# Patient Record
Sex: FEMALE | Race: WHITE | NOT HISPANIC OR LATINO
[De-identification: names, ages, dates, MRNs, and addresses within clinical notes are randomized per-mention and may not be internally consistent; named-entity substitution may affect disease eponyms.]

---

## 2017-12-13 ENCOUNTER — IMAGING SERVICES (OUTPATIENT)
Dept: OTHER | Age: 67
End: 2017-12-13

## 2017-12-13 ENCOUNTER — HOSPITAL (OUTPATIENT)
Dept: OTHER | Age: 67
End: 2017-12-13
Attending: INTERNAL MEDICINE

## 2017-12-22 ENCOUNTER — CHARTING TRANS (OUTPATIENT)
Dept: OTHER | Age: 67
End: 2017-12-22

## 2017-12-22 ENCOUNTER — MYAURORA ACCOUNT LINK (OUTPATIENT)
Dept: OTHER | Age: 67
End: 2017-12-22

## 2017-12-26 ENCOUNTER — LAB SERVICES (OUTPATIENT)
Dept: OTHER | Age: 67
End: 2017-12-26

## 2017-12-27 ENCOUNTER — CHARTING TRANS (OUTPATIENT)
Dept: OTHER | Age: 67
End: 2017-12-27

## 2017-12-27 LAB
ALBUMIN SERPL-MCNC: 3.7 G/DL (ref 3.6–5.1)
ALBUMIN/GLOB SERPL: 1.1 (ref 1–2.4)
ALP SERPL-CCNC: 180 UNITS/L (ref 45–117)
ALT SERPL-CCNC: 54 UNITS/L
ANION GAP SERPL CALC-SCNC: 14 MMOL/L (ref 10–20)
AST SERPL-CCNC: 33 UNITS/L
BASOPHILS # BLD: 0.1 K/MCL (ref 0–0.3)
BASOPHILS NFR BLD: 1 %
BILIRUB SERPL-MCNC: 0.5 MG/DL (ref 0.2–1)
BUN SERPL-MCNC: 21 MG/DL (ref 6–20)
BUN/CREAT SERPL: 20 (ref 7–25)
CALCIUM SERPL-MCNC: 8.9 MG/DL (ref 8.4–10.2)
CHLORIDE SERPL-SCNC: 106 MMOL/L (ref 98–107)
CHOLEST SERPL-MCNC: 150 MG/DL
CHOLEST/HDLC SERPL: 3.3
CO2 SERPL-SCNC: 26 MMOL/L (ref 21–32)
CREAT SERPL-MCNC: 1.05 MG/DL (ref 0.51–0.95)
DIFFERENTIAL METHOD BLD: ABNORMAL
EOSINOPHIL # BLD: 0.6 K/MCL (ref 0.1–0.5)
EOSINOPHIL NFR BLD: 11 %
ERYTHROCYTE [DISTWIDTH] IN BLOOD: 13.9 % (ref 11–15)
GLOBULIN SER-MCNC: 3.3 G/DL (ref 2–4)
GLUCOSE SERPL-MCNC: 127 MG/DL (ref 65–99)
HDLC SERPL-MCNC: 46 MG/DL
HEMATOCRIT: 41.6 % (ref 36–46.5)
HEMOGLOBIN: 13.3 G/DL (ref 12–15.5)
LDLC SERPL CALC-MCNC: 75 MG/DL
LENGTH OF FAST TIME PATIENT: ABNORMAL HRS
LENGTH OF FAST TIME PATIENT: ABNORMAL HRS
LYMPHOCYTES # BLD: 1 K/MCL (ref 1–4)
LYMPHOCYTES NFR BLD: 19 %
MEAN CORPUSCULAR HEMOGLOBIN: 29.9 PG (ref 26–34)
MEAN CORPUSCULAR HGB CONC: 32 G/DL (ref 32–36.5)
MEAN CORPUSCULAR VOLUME: 93.5 FL (ref 78–100)
MONOCYTES # BLD: 0.3 K/MCL (ref 0.3–0.9)
MONOCYTES NFR BLD: 5 %
NEUTROPHILS # BLD: 3.5 K/MCL (ref 1.8–7.7)
NEUTROPHILS NFR BLD: 64 %
NONHDLC SERPL-MCNC: 104 MG/DL
PLATELET COUNT: 213 K/MCL (ref 140–450)
POTASSIUM SERPL-SCNC: 4.2 MMOL/L (ref 3.4–5.1)
RED CELL COUNT: 4.45 MIL/MCL (ref 4–5.2)
SODIUM SERPL-SCNC: 142 MMOL/L (ref 135–145)
TOTAL PROTEIN: 7 G/DL (ref 6.4–8.2)
TRIGL SERPL-MCNC: 143 MG/DL
TSH SERPL-ACNC: 1.05 MCUNITS/ML (ref 0.35–5)
WHITE BLOOD COUNT: 5.5 K/MCL (ref 4.2–11)

## 2018-01-12 ENCOUNTER — HOSPITAL (OUTPATIENT)
Dept: OTHER | Age: 68
End: 2018-01-12
Attending: INTERNAL MEDICINE

## 2018-02-28 ENCOUNTER — LAB SERVICES (OUTPATIENT)
Dept: OTHER | Age: 68
End: 2018-02-28

## 2018-03-01 ENCOUNTER — CHARTING TRANS (OUTPATIENT)
Dept: OTHER | Age: 68
End: 2018-03-01

## 2018-03-01 LAB
GLUCOSE SERPL-MCNC: 95 MG/DL (ref 65–99)
HBA1C MFR BLD: 6.3 % (ref 4.5–5.6)
LENGTH OF FAST TIME PATIENT: NORMAL HRS

## 2018-04-30 ENCOUNTER — TELEPHONE (OUTPATIENT)
Dept: INTERNAL MEDICINE CLINIC | Facility: CLINIC | Age: 68
End: 2018-04-30

## 2018-04-30 ENCOUNTER — OFFICE VISIT (OUTPATIENT)
Dept: INTERNAL MEDICINE CLINIC | Facility: CLINIC | Age: 68
End: 2018-04-30

## 2018-04-30 VITALS
HEART RATE: 74 BPM | DIASTOLIC BLOOD PRESSURE: 60 MMHG | HEIGHT: 62.75 IN | OXYGEN SATURATION: 99 % | WEIGHT: 178 LBS | TEMPERATURE: 99 F | RESPIRATION RATE: 16 BRPM | SYSTOLIC BLOOD PRESSURE: 140 MMHG | BODY MASS INDEX: 31.94 KG/M2

## 2018-04-30 DIAGNOSIS — C44.319 BASAL CELL CARCINOMA (BCC) OF SKIN OF OTHER PART OF FACE: ICD-10-CM

## 2018-04-30 DIAGNOSIS — F33.1 MDD (MAJOR DEPRESSIVE DISORDER), RECURRENT EPISODE, MODERATE (HCC): ICD-10-CM

## 2018-04-30 DIAGNOSIS — I10 ESSENTIAL HYPERTENSION: ICD-10-CM

## 2018-04-30 DIAGNOSIS — E66.09 CLASS 1 OBESITY DUE TO EXCESS CALORIES WITH SERIOUS COMORBIDITY AND BODY MASS INDEX (BMI) OF 31.0 TO 31.9 IN ADULT: ICD-10-CM

## 2018-04-30 DIAGNOSIS — M94.9 DISORDER OF BONE AND CARTILAGE: ICD-10-CM

## 2018-04-30 DIAGNOSIS — E78.2 MIXED HYPERLIPIDEMIA: Primary | ICD-10-CM

## 2018-04-30 DIAGNOSIS — M89.9 DISORDER OF BONE AND CARTILAGE: ICD-10-CM

## 2018-04-30 PROBLEM — C44.310 BASAL CELL CARCINOMA (BCC) OF SKIN OF FACE: Status: ACTIVE | Noted: 2018-04-30

## 2018-04-30 PROBLEM — E66.811 CLASS 1 OBESITY DUE TO EXCESS CALORIES WITH SERIOUS COMORBIDITY AND BODY MASS INDEX (BMI) OF 31.0 TO 31.9 IN ADULT: Status: ACTIVE | Noted: 2018-04-30

## 2018-04-30 PROCEDURE — 99204 OFFICE O/P NEW MOD 45 MIN: CPT | Performed by: INTERNAL MEDICINE

## 2018-04-30 RX ORDER — ATORVASTATIN CALCIUM 20 MG/1
20 TABLET, FILM COATED ORAL DAILY
COMMUNITY
Start: 2018-04-16 | End: 2018-10-25

## 2018-04-30 RX ORDER — CETIRIZINE HYDROCHLORIDE 10 MG/1
10 TABLET ORAL DAILY
COMMUNITY

## 2018-04-30 RX ORDER — FLUOXETINE HYDROCHLORIDE 20 MG/1
20 CAPSULE ORAL DAILY
COMMUNITY
End: 2018-06-18

## 2018-04-30 RX ORDER — CALCIUM POLYCARBOPHIL 625 MG
1 TABLET ORAL 2 TIMES DAILY
COMMUNITY
End: 2020-04-10

## 2018-04-30 RX ORDER — GARLIC EXTRACT 500 MG
1 CAPSULE ORAL DAILY
COMMUNITY
End: 2022-01-21

## 2018-04-30 RX ORDER — METOPROLOL TARTRATE 50 MG/1
50 TABLET, FILM COATED ORAL 2 TIMES DAILY
COMMUNITY
Start: 2018-03-20 | End: 2019-11-03

## 2018-04-30 RX ORDER — MULTIVIT-MINS NO.63/IRON/FOLIC 27 MG-1 MG
1 TABLET ORAL DAILY
COMMUNITY
End: 2018-08-10 | Stop reason: CLARIF

## 2018-04-30 NOTE — PROGRESS NOTES
Humera Coronel is a 74year old female. HPI:   Patient presents for the following issues. She was 10 minutes late. 1. HTN: currently on metoprolol. Last took it last night. Does not measure blood pressures at home.    2. HLP: doing well on atorvastat Encounters:  04/30/18 : 178 lb        Latex                   Rash  Asacol [Mesalamine]     Rash  Atenolol                Rash  Codeine                 Nausea and vomiting    Family History   Problem Relation Age of Onset   • Diabetes Father    • Heart Dis metoprolol 50 BID for now. If remains above goal at next visit I will add another medication. # HLP: doing well on atorvastatin. Check lipids with next set of labs. # MDD, Recurrent: she feels much better when on prozac. Has been on it for 35 years.  Wi

## 2018-05-01 NOTE — PROGRESS NOTES
Colonoscopy on 2/21/2018 by Dr. Tod Wong: normal except for grade 1-2 internal hemorrhoids. Biopsies were negative for dysplasia but she did have mild chronic inflammation in righth colon.

## 2018-05-03 ENCOUNTER — MED REC SCAN ONLY (OUTPATIENT)
Dept: INTERNAL MEDICINE CLINIC | Facility: CLINIC | Age: 68
End: 2018-05-03

## 2018-05-03 ENCOUNTER — TELEPHONE (OUTPATIENT)
Dept: INTERNAL MEDICINE CLINIC | Facility: CLINIC | Age: 68
End: 2018-05-03

## 2018-05-08 NOTE — PROGRESS NOTES
We received records from [facility] which will be scanned in.     Labs 2/28/2018  A1C 6.3  CMP GFR 52 o/w normal    Labs 12/27/2017    CBC normal  Total cholesterol 150  HDL 46    LDL 75    MRI/MRCP on 4/1/2016: hepatic steatosis, s

## 2018-05-09 ENCOUNTER — APPOINTMENT (OUTPATIENT)
Dept: LAB | Age: 68
End: 2018-05-09
Attending: INTERNAL MEDICINE
Payer: MEDICARE

## 2018-05-09 ENCOUNTER — HOSPITAL ENCOUNTER (OUTPATIENT)
Dept: BONE DENSITY | Age: 68
Discharge: HOME | End: 2018-05-09
Attending: INTERNAL MEDICINE
Payer: MEDICARE

## 2018-05-09 DIAGNOSIS — I10 ESSENTIAL HYPERTENSION: ICD-10-CM

## 2018-05-09 DIAGNOSIS — M89.9 DISORDER OF BONE AND CARTILAGE: ICD-10-CM

## 2018-05-09 DIAGNOSIS — M94.9 DISORDER OF BONE AND CARTILAGE: ICD-10-CM

## 2018-05-09 DIAGNOSIS — F33.1 MDD (MAJOR DEPRESSIVE DISORDER), RECURRENT EPISODE, MODERATE (HCC): ICD-10-CM

## 2018-05-09 DIAGNOSIS — E78.2 MIXED HYPERLIPIDEMIA: ICD-10-CM

## 2018-05-09 PROCEDURE — 84460 ALANINE AMINO (ALT) (SGPT): CPT

## 2018-05-09 PROCEDURE — 86803 HEPATITIS C AB TEST: CPT

## 2018-05-09 PROCEDURE — 80048 BASIC METABOLIC PNL TOTAL CA: CPT

## 2018-05-09 PROCEDURE — 36415 COLL VENOUS BLD VENIPUNCTURE: CPT

## 2018-05-09 PROCEDURE — 84450 TRANSFERASE (AST) (SGOT): CPT

## 2018-05-09 PROCEDURE — 80061 LIPID PANEL: CPT

## 2018-05-09 PROCEDURE — 77080 DXA BONE DENSITY AXIAL: CPT | Performed by: INTERNAL MEDICINE

## 2018-06-18 ENCOUNTER — OFFICE VISIT (OUTPATIENT)
Dept: INTERNAL MEDICINE CLINIC | Facility: CLINIC | Age: 68
End: 2018-06-18

## 2018-06-18 VITALS
TEMPERATURE: 98 F | DIASTOLIC BLOOD PRESSURE: 60 MMHG | RESPIRATION RATE: 16 BRPM | HEART RATE: 64 BPM | SYSTOLIC BLOOD PRESSURE: 124 MMHG | HEIGHT: 63 IN | WEIGHT: 176 LBS | BODY MASS INDEX: 31.18 KG/M2

## 2018-06-18 DIAGNOSIS — E66.09 CLASS 1 OBESITY DUE TO EXCESS CALORIES WITH SERIOUS COMORBIDITY AND BODY MASS INDEX (BMI) OF 31.0 TO 31.9 IN ADULT: ICD-10-CM

## 2018-06-18 DIAGNOSIS — I10 ESSENTIAL HYPERTENSION: Primary | ICD-10-CM

## 2018-06-18 DIAGNOSIS — E78.2 MIXED HYPERLIPIDEMIA: ICD-10-CM

## 2018-06-18 DIAGNOSIS — F33.1 MDD (MAJOR DEPRESSIVE DISORDER), RECURRENT EPISODE, MODERATE (HCC): ICD-10-CM

## 2018-06-18 PROCEDURE — 99214 OFFICE O/P EST MOD 30 MIN: CPT | Performed by: INTERNAL MEDICINE

## 2018-06-18 RX ORDER — FLUOXETINE HYDROCHLORIDE 20 MG/1
20 CAPSULE ORAL DAILY
Qty: 90 CAPSULE | Refills: 3 | Status: SHIPPED | OUTPATIENT
Start: 2018-06-18 | End: 2019-01-02

## 2018-06-18 NOTE — PATIENT INSTRUCTIONS
You need 3 eight ounce servings of calcium/vitamin D daily. This includes spinach, kale, broccoli, almonds, milk, yogurt, or cottage cheese.      Please have labs completed in December, 2018 and see me 1 week later for your medicare wellness exam.     I do

## 2018-08-08 ENCOUNTER — HOSPITAL ENCOUNTER (EMERGENCY)
Facility: HOSPITAL | Age: 68
Discharge: HOME | End: 2018-08-09
Attending: STUDENT IN AN ORGANIZED HEALTH CARE EDUCATION/TRAINING PROGRAM
Payer: MEDICARE

## 2018-08-08 DIAGNOSIS — R10.13 EPIGASTRIC PAIN: Primary | ICD-10-CM

## 2018-08-08 PROCEDURE — 96361 HYDRATE IV INFUSION ADD-ON: CPT

## 2018-08-08 PROCEDURE — 96360 HYDRATION IV INFUSION INIT: CPT

## 2018-08-08 PROCEDURE — 99285 EMERGENCY DEPT VISIT HI MDM: CPT

## 2018-08-08 PROCEDURE — 93010 ELECTROCARDIOGRAM REPORT: CPT

## 2018-08-08 PROCEDURE — 93005 ELECTROCARDIOGRAM TRACING: CPT

## 2018-08-09 ENCOUNTER — APPOINTMENT (OUTPATIENT)
Dept: GENERAL RADIOLOGY | Facility: HOSPITAL | Age: 68
End: 2018-08-09
Attending: STUDENT IN AN ORGANIZED HEALTH CARE EDUCATION/TRAINING PROGRAM
Payer: MEDICARE

## 2018-08-09 ENCOUNTER — APPOINTMENT (OUTPATIENT)
Dept: CT IMAGING | Facility: HOSPITAL | Age: 68
End: 2018-08-09
Attending: STUDENT IN AN ORGANIZED HEALTH CARE EDUCATION/TRAINING PROGRAM
Payer: MEDICARE

## 2018-08-09 VITALS
HEART RATE: 57 BPM | HEIGHT: 63 IN | BODY MASS INDEX: 30.12 KG/M2 | DIASTOLIC BLOOD PRESSURE: 60 MMHG | TEMPERATURE: 98 F | RESPIRATION RATE: 10 BRPM | SYSTOLIC BLOOD PRESSURE: 132 MMHG | WEIGHT: 170 LBS | OXYGEN SATURATION: 100 %

## 2018-08-09 LAB
ALBUMIN SERPL-MCNC: 3.8 G/DL (ref 3.5–4.8)
ALBUMIN/GLOB SERPL: 1 {RATIO} (ref 1–2)
ALP LIVER SERPL-CCNC: 143 U/L (ref 50–130)
ALT SERPL-CCNC: 51 U/L (ref 14–54)
ANION GAP SERPL CALC-SCNC: 10 MMOL/L (ref 0–18)
AST SERPL-CCNC: 44 U/L (ref 15–41)
ATRIAL RATE: 68 BPM
BASOPHILS # BLD AUTO: 0.1 X10(3) UL (ref 0–0.1)
BASOPHILS NFR BLD AUTO: 1.1 %
BILIRUB SERPL-MCNC: 0.5 MG/DL (ref 0.1–2)
BUN BLD-MCNC: 25 MG/DL (ref 8–20)
BUN/CREAT SERPL: 23.8 (ref 10–20)
CALCIUM BLD-MCNC: 8.8 MG/DL (ref 8.3–10.3)
CHLORIDE SERPL-SCNC: 106 MMOL/L (ref 101–111)
CO2 SERPL-SCNC: 23 MMOL/L (ref 22–32)
CREAT BLD-MCNC: 1.05 MG/DL (ref 0.55–1.02)
D-DIMER: <0.27 UG/ML FEU (ref 0–0.49)
EOSINOPHIL # BLD AUTO: 0.35 X10(3) UL (ref 0–0.3)
EOSINOPHIL NFR BLD AUTO: 3.9 %
ERYTHROCYTE [DISTWIDTH] IN BLOOD BY AUTOMATED COUNT: 12.9 % (ref 11.5–16)
GLOBULIN PLAS-MCNC: 3.8 G/DL (ref 2.5–3.7)
GLUCOSE BLD-MCNC: 158 MG/DL (ref 70–99)
HCT VFR BLD AUTO: 40.4 % (ref 34–50)
HGB BLD-MCNC: 13.3 G/DL (ref 12–16)
IMMATURE GRANULOCYTE COUNT: 0.04 X10(3) UL (ref 0–1)
IMMATURE GRANULOCYTE RATIO %: 0.4 %
LIPASE: 786 U/L (ref 73–393)
LYMPHOCYTES # BLD AUTO: 0.89 X10(3) UL (ref 0.9–4)
LYMPHOCYTES NFR BLD AUTO: 9.9 %
M PROTEIN MFR SERPL ELPH: 7.6 G/DL (ref 6.1–8.3)
MCH RBC QN AUTO: 29.7 PG (ref 27–33.2)
MCHC RBC AUTO-ENTMCNC: 32.9 G/DL (ref 31–37)
MCV RBC AUTO: 90.2 FL (ref 81–100)
MONOCYTES # BLD AUTO: 0.57 X10(3) UL (ref 0.1–1)
MONOCYTES NFR BLD AUTO: 6.4 %
NEUTROPHIL ABS PRELIM: 7 X10 (3) UL (ref 1.3–6.7)
NEUTROPHILS # BLD AUTO: 7 X10(3) UL (ref 1.3–6.7)
NEUTROPHILS NFR BLD AUTO: 78.3 %
OSMOLALITY SERPL CALC.SUM OF ELEC: 296 MOSM/KG (ref 275–295)
P AXIS: 47 DEGREES
P-R INTERVAL: 144 MS
PLATELET # BLD AUTO: 237 10(3)UL (ref 150–450)
POTASSIUM SERPL-SCNC: 3.6 MMOL/L (ref 3.6–5.1)
Q-T INTERVAL: 442 MS
QRS DURATION: 74 MS
QTC CALCULATION (BEZET): 469 MS
R AXIS: 11 DEGREES
RBC # BLD AUTO: 4.48 X10(6)UL (ref 3.8–5.1)
RED CELL DISTRIBUTION WIDTH-SD: 42 FL (ref 35.1–46.3)
SODIUM SERPL-SCNC: 139 MMOL/L (ref 136–144)
T AXIS: 76 DEGREES
TROPONIN I SERPL-MCNC: <0.046 NG/ML (ref ?–0.05)
VENTRICULAR RATE: 68 BPM
WBC # BLD AUTO: 9 X10(3) UL (ref 4–13)

## 2018-08-09 PROCEDURE — 74177 CT ABD & PELVIS W/CONTRAST: CPT | Performed by: STUDENT IN AN ORGANIZED HEALTH CARE EDUCATION/TRAINING PROGRAM

## 2018-08-09 PROCEDURE — 85025 COMPLETE CBC W/AUTO DIFF WBC: CPT | Performed by: STUDENT IN AN ORGANIZED HEALTH CARE EDUCATION/TRAINING PROGRAM

## 2018-08-09 PROCEDURE — 85378 FIBRIN DEGRADE SEMIQUANT: CPT | Performed by: STUDENT IN AN ORGANIZED HEALTH CARE EDUCATION/TRAINING PROGRAM

## 2018-08-09 PROCEDURE — 71045 X-RAY EXAM CHEST 1 VIEW: CPT | Performed by: STUDENT IN AN ORGANIZED HEALTH CARE EDUCATION/TRAINING PROGRAM

## 2018-08-09 PROCEDURE — 84484 ASSAY OF TROPONIN QUANT: CPT | Performed by: STUDENT IN AN ORGANIZED HEALTH CARE EDUCATION/TRAINING PROGRAM

## 2018-08-09 PROCEDURE — 80053 COMPREHEN METABOLIC PANEL: CPT | Performed by: STUDENT IN AN ORGANIZED HEALTH CARE EDUCATION/TRAINING PROGRAM

## 2018-08-09 PROCEDURE — 83690 ASSAY OF LIPASE: CPT | Performed by: STUDENT IN AN ORGANIZED HEALTH CARE EDUCATION/TRAINING PROGRAM

## 2018-08-09 RX ORDER — SODIUM CHLORIDE 9 MG/ML
1000 INJECTION, SOLUTION INTRAVENOUS ONCE
Status: COMPLETED | OUTPATIENT
Start: 2018-08-09 | End: 2018-08-09

## 2018-08-09 RX ORDER — MAGNESIUM HYDROXIDE/ALUMINUM HYDROXICE/SIMETHICONE 120; 1200; 1200 MG/30ML; MG/30ML; MG/30ML
30 SUSPENSION ORAL ONCE
Status: COMPLETED | OUTPATIENT
Start: 2018-08-09 | End: 2018-08-09

## 2018-08-09 RX ORDER — FAMOTIDINE 20 MG/1
20 TABLET ORAL 2 TIMES DAILY PRN
Qty: 30 TABLET | Refills: 0 | Status: SHIPPED | OUTPATIENT
Start: 2018-08-09 | End: 2018-08-23

## 2018-08-09 NOTE — ED INITIAL ASSESSMENT (HPI)
To the ED with c/o mid epigastric pain since 9pm. Pt states feels like sharp, squeezing pain. Pt repots pain was constant x 1 hour and now intermittent. Pt reports nausea and sweating with pain. + CLAUDIA with the pain.  States normal BM today, no recent travel

## 2018-08-09 NOTE — ED PROVIDER NOTES
Patient Seen in: [facility] Emergency Department    History   Patient presents with:  Chest Pain Angina (cardiovascular)    Stated Complaint: Upper abdominal pain    HPI    Patient is a 24-year-old female who presents emergency department reporting reviewed and negative except as noted above.     Physical Exam   ED Triage Vitals [08/08/18 2356]  BP: 160/60  Pulse: 68  Resp: 18  Temp: 97.6 °F (36.4 °C)  Temp src: Temporal  SpO2: 95 %  O2 Device: None (Room air)    Current:/60   Pulse 57   Temp 97 components within normal limits   TROPONIN I - Normal   D-DIMER - Normal    Narrative:     FEU = Fibrinogen Equivalent Units.     D-Dimer results of less than 0.5 ug/mL (FEU) have been shown to contribute to the exclusion of venous thromboembolism with a ne abdomen pelvis showed no concerning surgical emergency. Given patient's significant improvement in her overall clinical picture at this time I feel it is safe for her to follow-up on outpatient basis.   She was educated at length on reasons to return to

## 2018-08-10 ENCOUNTER — LAB ENCOUNTER (OUTPATIENT)
Dept: LAB | Age: 68
End: 2018-08-10
Attending: PHYSICIAN ASSISTANT
Payer: MEDICARE

## 2018-08-10 ENCOUNTER — OFFICE VISIT (OUTPATIENT)
Dept: INTERNAL MEDICINE CLINIC | Facility: CLINIC | Age: 68
End: 2018-08-10
Payer: MEDICARE

## 2018-08-10 VITALS
HEART RATE: 58 BPM | TEMPERATURE: 99 F | WEIGHT: 176 LBS | BODY MASS INDEX: 31.58 KG/M2 | HEIGHT: 62.75 IN | OXYGEN SATURATION: 98 % | RESPIRATION RATE: 16 BRPM | SYSTOLIC BLOOD PRESSURE: 118 MMHG | DIASTOLIC BLOOD PRESSURE: 80 MMHG

## 2018-08-10 DIAGNOSIS — R61 EXCESSIVE SWEATING: ICD-10-CM

## 2018-08-10 DIAGNOSIS — R10.13 EPIGASTRIC ABDOMINAL PAIN: Primary | ICD-10-CM

## 2018-08-10 DIAGNOSIS — R74.8 ELEVATED LIPASE: ICD-10-CM

## 2018-08-10 DIAGNOSIS — R10.12 LUQ ABDOMINAL PAIN: ICD-10-CM

## 2018-08-10 DIAGNOSIS — R10.13 EPIGASTRIC ABDOMINAL PAIN: ICD-10-CM

## 2018-08-10 LAB
ALBUMIN SERPL-MCNC: 3.9 G/DL (ref 3.5–4.8)
ALBUMIN/GLOB SERPL: 1.1 {RATIO} (ref 1–2)
ALP LIVER SERPL-CCNC: 146 U/L (ref 50–130)
ALT SERPL-CCNC: 51 U/L (ref 14–54)
ANION GAP SERPL CALC-SCNC: 4 MMOL/L (ref 0–18)
AST SERPL-CCNC: 29 U/L (ref 15–41)
BASOPHILS # BLD AUTO: 0.09 X10(3) UL (ref 0–0.1)
BASOPHILS NFR BLD AUTO: 1.4 %
BILIRUB SERPL-MCNC: 0.5 MG/DL (ref 0.1–2)
BUN BLD-MCNC: 24 MG/DL (ref 8–20)
BUN/CREAT SERPL: 22.2 (ref 10–20)
CALCIUM BLD-MCNC: 9.3 MG/DL (ref 8.3–10.3)
CHLORIDE SERPL-SCNC: 108 MMOL/L (ref 101–111)
CO2 SERPL-SCNC: 29 MMOL/L (ref 22–32)
CREAT BLD-MCNC: 1.08 MG/DL (ref 0.55–1.02)
EOSINOPHIL # BLD AUTO: 0.57 X10(3) UL (ref 0–0.3)
EOSINOPHIL NFR BLD AUTO: 9.1 %
ERYTHROCYTE [DISTWIDTH] IN BLOOD BY AUTOMATED COUNT: 13.1 % (ref 11.5–16)
GLOBULIN PLAS-MCNC: 3.5 G/DL (ref 2.5–3.7)
GLUCOSE BLD-MCNC: 97 MG/DL (ref 70–99)
HCT VFR BLD AUTO: 39.8 % (ref 34–50)
HGB BLD-MCNC: 13 G/DL (ref 12–16)
IMMATURE GRANULOCYTE COUNT: 0.03 X10(3) UL (ref 0–1)
IMMATURE GRANULOCYTE RATIO %: 0.5 %
LIPASE: 204 U/L (ref 73–393)
LYMPHOCYTES # BLD AUTO: 1.1 X10(3) UL (ref 0.9–4)
LYMPHOCYTES NFR BLD AUTO: 17.6 %
M PROTEIN MFR SERPL ELPH: 7.4 G/DL (ref 6.1–8.3)
MCH RBC QN AUTO: 30 PG (ref 27–33.2)
MCHC RBC AUTO-ENTMCNC: 32.7 G/DL (ref 31–37)
MCV RBC AUTO: 91.7 FL (ref 81–100)
MONOCYTES # BLD AUTO: 0.55 X10(3) UL (ref 0.1–1)
MONOCYTES NFR BLD AUTO: 8.8 %
NEUTROPHIL ABS PRELIM: 3.9 X10 (3) UL (ref 1.3–6.7)
NEUTROPHILS # BLD AUTO: 3.9 X10(3) UL (ref 1.3–6.7)
NEUTROPHILS NFR BLD AUTO: 62.6 %
OSMOLALITY SERPL CALC.SUM OF ELEC: 296 MOSM/KG (ref 275–295)
PLATELET # BLD AUTO: 206 10(3)UL (ref 150–450)
POTASSIUM SERPL-SCNC: 4.4 MMOL/L (ref 3.6–5.1)
RBC # BLD AUTO: 4.34 X10(6)UL (ref 3.8–5.1)
RED CELL DISTRIBUTION WIDTH-SD: 43.8 FL (ref 35.1–46.3)
SODIUM SERPL-SCNC: 141 MMOL/L (ref 136–144)
TSI SER-ACNC: 1.2 MIU/ML (ref 0.35–5.5)
WBC # BLD AUTO: 6.2 X10(3) UL (ref 4–13)

## 2018-08-10 PROCEDURE — 85025 COMPLETE CBC W/AUTO DIFF WBC: CPT

## 2018-08-10 PROCEDURE — 84443 ASSAY THYROID STIM HORMONE: CPT

## 2018-08-10 PROCEDURE — 83690 ASSAY OF LIPASE: CPT

## 2018-08-10 PROCEDURE — 80053 COMPREHEN METABOLIC PANEL: CPT

## 2018-08-10 PROCEDURE — 36415 COLL VENOUS BLD VENIPUNCTURE: CPT

## 2018-08-10 PROCEDURE — 99214 OFFICE O/P EST MOD 30 MIN: CPT | Performed by: PHYSICIAN ASSISTANT

## 2018-08-10 NOTE — PROGRESS NOTES
Zachary Garner is a 74year old female. HPI:   Patient presents for f/u from ED visit yesterday. Seen for epigastric pain - described as a \"squeezing\" pain, SOB, nausea. Denies vomiting, diarrhea, constipation, melena, BRBPR, heartburn.   Denies ch cough  RESPIRATORY: denies SOB, COBURN  CARDIOVASCULAR: denies orthopnea  GI: per HPI  : denies dysuria, hematuria  NEURO: denies headaches, dizziness, LH  EXT: denies edema    EXAM:   /80   Pulse 58   Temp 98.5 °F (36.9 °C) (Oral)   Resp 16   Ht 62. 7

## 2018-08-10 NOTE — ED NOTES
Laurie Pierce from lab called, states that lab was having some problems with their machine last night and initially reported D dimer result of 0.36 was actually less than 0.27 ( when fixed). Dr Victor Manuel Funez notified and no further action needed.

## 2018-08-10 NOTE — PATIENT INSTRUCTIONS
Please seek immediate attention if developing recurrent abdominal pain, vomiting, fever, or other new or worsening symptoms. Follow up with GI as planned.

## 2018-09-12 ENCOUNTER — LAB ENCOUNTER (OUTPATIENT)
Dept: LAB | Age: 68
End: 2018-09-12
Attending: INTERNAL MEDICINE
Payer: MEDICARE

## 2018-09-12 DIAGNOSIS — R74.01 NONSPECIFIC ELEVATION OF LEVELS OF TRANSAMINASE OR LACTIC ACID DEHYDROGENASE (LDH): ICD-10-CM

## 2018-09-12 DIAGNOSIS — K52.839 MICROSCOPIC COLITIS: ICD-10-CM

## 2018-09-12 DIAGNOSIS — K85.90 PANCREATITIS: Primary | ICD-10-CM

## 2018-09-12 DIAGNOSIS — R74.02 NONSPECIFIC ELEVATION OF LEVELS OF TRANSAMINASE OR LACTIC ACID DEHYDROGENASE (LDH): ICD-10-CM

## 2018-09-12 LAB — GGT SERPL-CCNC: 97 U/L (ref 5–55)

## 2018-09-12 PROCEDURE — 82977 ASSAY OF GGT: CPT

## 2018-09-12 PROCEDURE — 82787 IGG 1 2 3 OR 4 EACH: CPT

## 2018-09-12 PROCEDURE — 36415 COLL VENOUS BLD VENIPUNCTURE: CPT

## 2018-09-14 LAB
IMMUNOGLOBULIN G SUBCLASS 1: 521 MG/DL
IMMUNOGLOBULIN G SUBCLASS 2: 217 MG/DL
IMMUNOGLOBULIN G SUBCLASS 3: 77 MG/DL
IMMUNOGLOBULIN G SUBCLASS 4: 13 MG/DL

## 2018-10-04 ENCOUNTER — TELEPHONE (OUTPATIENT)
Dept: INTERNAL MEDICINE CLINIC | Facility: CLINIC | Age: 68
End: 2018-10-04

## 2018-10-04 NOTE — TELEPHONE ENCOUNTER
Patient calling and has questions regarding her mammogram for this year; she is new to Dr Ann Marie Desai and is having other doctor send results from last year she does not know when she needs to get done for insurance to cover it/parameters.  Please callback to disc

## 2018-10-09 NOTE — TELEPHONE ENCOUNTER
Patient informed mammogram order will be issued @ wellness visit after breast exam, if she has a copy of old mammogram report to bring to wellness visit.  Patient also advised when she has her mammogram done @ [facility] , a medical release form will be request

## 2018-10-25 DIAGNOSIS — E78.2 MIXED HYPERLIPIDEMIA: ICD-10-CM

## 2018-10-25 DIAGNOSIS — F33.1 MDD (MAJOR DEPRESSIVE DISORDER), RECURRENT EPISODE, MODERATE (HCC): ICD-10-CM

## 2018-10-25 DIAGNOSIS — I10 ESSENTIAL HYPERTENSION: ICD-10-CM

## 2018-10-25 RX ORDER — ATORVASTATIN CALCIUM 20 MG/1
20 TABLET, FILM COATED ORAL DAILY
Qty: 90 TABLET | Refills: 0 | Status: SHIPPED | OUTPATIENT
Start: 2018-10-25 | End: 2019-01-28

## 2018-10-25 NOTE — TELEPHONE ENCOUNTER
Refill requested:   Requested Prescriptions     Pending Prescriptions Disp Refills   • atorvastatin 20 MG Oral Tab 90 tablet 0     Sig: Take 1 tablet (20 mg total) by mouth daily.        Passed protocol    Last refill: 4/16/2018 # 90 tabs with 3 refills

## 2018-11-02 VITALS
HEART RATE: 70 BPM | TEMPERATURE: 97.7 F | BODY MASS INDEX: 30.86 KG/M2 | HEIGHT: 63 IN | WEIGHT: 174.16 LBS | DIASTOLIC BLOOD PRESSURE: 76 MMHG | RESPIRATION RATE: 16 BRPM | SYSTOLIC BLOOD PRESSURE: 148 MMHG

## 2018-12-10 ENCOUNTER — TELEPHONE (OUTPATIENT)
Dept: INTERNAL MEDICINE CLINIC | Facility: CLINIC | Age: 68
End: 2018-12-10

## 2018-12-27 ENCOUNTER — APPOINTMENT (OUTPATIENT)
Dept: LAB | Age: 68
End: 2018-12-27
Attending: INTERNAL MEDICINE
Payer: MEDICARE

## 2018-12-27 DIAGNOSIS — F33.1 MDD (MAJOR DEPRESSIVE DISORDER), RECURRENT EPISODE, MODERATE (HCC): ICD-10-CM

## 2018-12-27 DIAGNOSIS — I10 ESSENTIAL HYPERTENSION: ICD-10-CM

## 2018-12-27 DIAGNOSIS — E78.2 MIXED HYPERLIPIDEMIA: ICD-10-CM

## 2018-12-27 PROCEDURE — 84460 ALANINE AMINO (ALT) (SGPT): CPT

## 2018-12-27 PROCEDURE — 36415 COLL VENOUS BLD VENIPUNCTURE: CPT

## 2018-12-27 PROCEDURE — 84450 TRANSFERASE (AST) (SGOT): CPT

## 2018-12-27 PROCEDURE — 80048 BASIC METABOLIC PNL TOTAL CA: CPT

## 2019-01-01 ENCOUNTER — EXTERNAL RECORD (OUTPATIENT)
Dept: HEALTH INFORMATION MANAGEMENT | Facility: OTHER | Age: 69
End: 2019-01-01

## 2019-01-02 ENCOUNTER — OFFICE VISIT (OUTPATIENT)
Dept: INTERNAL MEDICINE CLINIC | Facility: CLINIC | Age: 69
End: 2019-01-02
Payer: MEDICARE

## 2019-01-02 VITALS
TEMPERATURE: 97 F | WEIGHT: 178 LBS | HEART RATE: 88 BPM | HEIGHT: 63 IN | SYSTOLIC BLOOD PRESSURE: 130 MMHG | BODY MASS INDEX: 31.54 KG/M2 | RESPIRATION RATE: 16 BRPM | OXYGEN SATURATION: 99 % | DIASTOLIC BLOOD PRESSURE: 76 MMHG

## 2019-01-02 DIAGNOSIS — Z13.31 SCREENING FOR DEPRESSION: ICD-10-CM

## 2019-01-02 DIAGNOSIS — I10 ESSENTIAL HYPERTENSION: ICD-10-CM

## 2019-01-02 DIAGNOSIS — F33.1 MDD (MAJOR DEPRESSIVE DISORDER), RECURRENT EPISODE, MODERATE (HCC): ICD-10-CM

## 2019-01-02 DIAGNOSIS — E78.2 MIXED HYPERLIPIDEMIA: ICD-10-CM

## 2019-01-02 DIAGNOSIS — Z12.83 SCREENING FOR SKIN CANCER: ICD-10-CM

## 2019-01-02 DIAGNOSIS — Z00.00 ROUTINE GENERAL MEDICAL EXAMINATION AT A HEALTH CARE FACILITY: Primary | ICD-10-CM

## 2019-01-02 DIAGNOSIS — Z12.31 ENCOUNTER FOR SCREENING MAMMOGRAM FOR BREAST CANCER: ICD-10-CM

## 2019-01-02 DIAGNOSIS — R74.01 TRANSAMINITIS: ICD-10-CM

## 2019-01-02 DIAGNOSIS — R73.01 IMPAIRED FASTING GLUCOSE: ICD-10-CM

## 2019-01-02 PROCEDURE — G0438 PPPS, INITIAL VISIT: HCPCS | Performed by: INTERNAL MEDICINE

## 2019-01-02 PROCEDURE — 99214 OFFICE O/P EST MOD 30 MIN: CPT | Performed by: INTERNAL MEDICINE

## 2019-01-02 PROCEDURE — 90732 PPSV23 VACC 2 YRS+ SUBQ/IM: CPT | Performed by: INTERNAL MEDICINE

## 2019-01-02 PROCEDURE — G0009 ADMIN PNEUMOCOCCAL VACCINE: HCPCS | Performed by: INTERNAL MEDICINE

## 2019-01-02 RX ORDER — FLUOXETINE HYDROCHLORIDE 40 MG/1
40 CAPSULE ORAL DAILY
Qty: 90 CAPSULE | Refills: 1 | Status: SHIPPED | OUTPATIENT
Start: 2019-01-02 | End: 2019-07-25

## 2019-01-02 RX ORDER — FLUOXETINE HYDROCHLORIDE 40 MG/1
40 CAPSULE ORAL DAILY
Qty: 90 CAPSULE | Refills: 1 | Status: SHIPPED | OUTPATIENT
Start: 2019-01-02 | End: 2019-01-02

## 2019-01-02 NOTE — PROGRESS NOTES
Yvonne Wheeler is a 79year old female. HPI:   Patient presents for medicare wellness exam and the additional issues. Knows she will be billed for two visits. 1. Transaminitis: new on recent labs. She is holding most of her supplements.  Has not been is as above. EXT: often has swelling in left ankle.        Wt Readings from Last 6 Encounters:  01/02/19 : 178 lb  08/10/18 : 176 lb  08/08/18 : 170 lb  06/18/18 : 176 lb  04/30/18 : 178 lb        Latex                   RASH  Asacol [Mesalamine]     RASH pleasant  EXTREMITIES: no cyanosis, clubbing or edema  BREAST: she has cyst-like mass over right breast. She states it has been present for years and was aspirated. They follow it on her mammograms.  No nipple discharge, axillary lad, or other masses b/l grade 1-2 internal hemorrhoids. Biopsies were negative for dysplasia but she did have mild chronic inflammation in right colon. Needs repeat in February, 2023.    Screen for Breast Cancer: due for mammogram  Screen for Cervical Cancer: s/p CORBIN  Screen for O

## 2019-01-02 NOTE — PATIENT INSTRUCTIONS
Julio C [address]: [phone]    You need 3 eight ounce servings of calcium/vitamin D daily. This includes spinach, kale, broccoli, almonds, milk, yogurt, or cottage cheese.

## 2019-01-15 ENCOUNTER — HOSPITAL ENCOUNTER (OUTPATIENT)
Dept: MAMMOGRAPHY | Age: 69
Discharge: HOME | End: 2019-01-15
Attending: INTERNAL MEDICINE
Payer: MEDICARE

## 2019-01-15 ENCOUNTER — LAB ENCOUNTER (OUTPATIENT)
Dept: LAB | Age: 69
End: 2019-01-15
Attending: INTERNAL MEDICINE
Payer: MEDICARE

## 2019-01-15 DIAGNOSIS — R73.01 IMPAIRED FASTING GLUCOSE: ICD-10-CM

## 2019-01-15 DIAGNOSIS — Z12.31 ENCOUNTER FOR SCREENING MAMMOGRAM FOR BREAST CANCER: ICD-10-CM

## 2019-01-15 DIAGNOSIS — R74.01 TRANSAMINITIS: ICD-10-CM

## 2019-01-15 LAB
ALBUMIN SERPL-MCNC: 3.8 G/DL (ref 3.1–4.5)
ALP LIVER SERPL-CCNC: 176 U/L (ref 55–142)
ALT SERPL-CCNC: 52 U/L (ref 14–54)
AST SERPL-CCNC: 38 U/L (ref 15–41)
BILIRUB DIRECT SERPL-MCNC: 0.2 MG/DL (ref 0.1–0.5)
BILIRUB SERPL-MCNC: 0.8 MG/DL (ref 0.1–2)
EST. AVERAGE GLUCOSE BLD GHB EST-MCNC: 157 MG/DL (ref 68–126)
HAV IGM SER QL: NONREACTIVE
HBA1C MFR BLD HPLC: 7.1 % (ref ?–5.7)
HBV CORE IGM SER QL: NONREACTIVE
HBV SURFACE AG SERPL QL IA: NONREACTIVE
HCV AB SERPL QL IA: NONREACTIVE
IMMUNOGLOBULIN G: 934 MG/DL (ref 791–1643)
M PROTEIN MFR SERPL ELPH: 7.5 G/DL (ref 6.4–8.2)

## 2019-01-15 PROCEDURE — 83516 IMMUNOASSAY NONANTIBODY: CPT

## 2019-01-15 PROCEDURE — 82784 ASSAY IGA/IGD/IGG/IGM EACH: CPT

## 2019-01-15 PROCEDURE — 80074 ACUTE HEPATITIS PANEL: CPT

## 2019-01-15 PROCEDURE — 77067 SCR MAMMO BI INCL CAD: CPT | Performed by: INTERNAL MEDICINE

## 2019-01-15 PROCEDURE — 36415 COLL VENOUS BLD VENIPUNCTURE: CPT

## 2019-01-15 PROCEDURE — 77063 BREAST TOMOSYNTHESIS BI: CPT | Performed by: INTERNAL MEDICINE

## 2019-01-15 PROCEDURE — 80076 HEPATIC FUNCTION PANEL: CPT

## 2019-01-15 PROCEDURE — 86256 FLUORESCENT ANTIBODY TITER: CPT

## 2019-01-15 PROCEDURE — 86038 ANTINUCLEAR ANTIBODIES: CPT

## 2019-01-15 PROCEDURE — 83036 HEMOGLOBIN GLYCOSYLATED A1C: CPT

## 2019-01-16 LAB — ANA SCREEN: NEGATIVE

## 2019-01-17 LAB — F-ACTIN (SMOOTH MUSCLE) AB: 57 UNITS

## 2019-01-28 DIAGNOSIS — I10 ESSENTIAL HYPERTENSION: ICD-10-CM

## 2019-01-28 DIAGNOSIS — F33.1 MDD (MAJOR DEPRESSIVE DISORDER), RECURRENT EPISODE, MODERATE (HCC): ICD-10-CM

## 2019-01-28 DIAGNOSIS — E78.2 MIXED HYPERLIPIDEMIA: ICD-10-CM

## 2019-01-28 RX ORDER — ATORVASTATIN CALCIUM 20 MG/1
TABLET, FILM COATED ORAL
Qty: 90 TABLET | Refills: 0 | Status: SHIPPED | OUTPATIENT
Start: 2019-01-28 | End: 2019-05-01

## 2019-01-28 NOTE — TELEPHONE ENCOUNTER
Refill requested:   Requested Prescriptions     Pending Prescriptions Disp Refills   • ATORVASTATIN 20 MG Oral Tab [Pharmacy Med Name: Atorvastatin Calcium Oral Tablet 20 MG] 90 tablet 0     Sig: TAKE 1 TABLET BY MOUTH ONE TIME A DAY       Passed protocol

## 2019-02-12 ENCOUNTER — LAB ENCOUNTER (OUTPATIENT)
Dept: LAB | Age: 69
End: 2019-02-12
Attending: INTERNAL MEDICINE
Payer: MEDICARE

## 2019-02-12 ENCOUNTER — OFFICE VISIT (OUTPATIENT)
Dept: INTERNAL MEDICINE CLINIC | Facility: CLINIC | Age: 69
End: 2019-02-12
Payer: MEDICARE

## 2019-02-12 VITALS
RESPIRATION RATE: 12 BRPM | SYSTOLIC BLOOD PRESSURE: 130 MMHG | HEIGHT: 63 IN | HEART RATE: 58 BPM | TEMPERATURE: 98 F | BODY MASS INDEX: 31.39 KG/M2 | OXYGEN SATURATION: 99 % | WEIGHT: 177.19 LBS | DIASTOLIC BLOOD PRESSURE: 70 MMHG

## 2019-02-12 DIAGNOSIS — F33.1 MDD (MAJOR DEPRESSIVE DISORDER), RECURRENT EPISODE, MODERATE (HCC): ICD-10-CM

## 2019-02-12 DIAGNOSIS — E11.9 TYPE 2 DIABETES MELLITUS WITHOUT COMPLICATION, WITHOUT LONG-TERM CURRENT USE OF INSULIN (HCC): ICD-10-CM

## 2019-02-12 DIAGNOSIS — E11.9 TYPE 2 DIABETES MELLITUS WITHOUT COMPLICATION, WITHOUT LONG-TERM CURRENT USE OF INSULIN (HCC): Primary | ICD-10-CM

## 2019-02-12 DIAGNOSIS — R74.01 TRANSAMINITIS: ICD-10-CM

## 2019-02-12 DIAGNOSIS — K58.0 IRRITABLE BOWEL SYNDROME WITH DIARRHEA: ICD-10-CM

## 2019-02-12 DIAGNOSIS — R19.7 DIARRHEA, UNSPECIFIED TYPE: ICD-10-CM

## 2019-02-12 LAB
EST. AVERAGE GLUCOSE BLD GHB EST-MCNC: 154 MG/DL (ref 68–126)
HBA1C MFR BLD HPLC: 7 % (ref ?–5.7)

## 2019-02-12 PROCEDURE — 83036 HEMOGLOBIN GLYCOSYLATED A1C: CPT

## 2019-02-12 PROCEDURE — 36415 COLL VENOUS BLD VENIPUNCTURE: CPT

## 2019-02-12 PROCEDURE — 99214 OFFICE O/P EST MOD 30 MIN: CPT | Performed by: INTERNAL MEDICINE

## 2019-02-12 NOTE — PATIENT INSTRUCTIONS
Please repeat your A1C in 3 months.     Please schedule appointments with the following physicians:  Dr. Rochelle Jean - gastroenterologist for the diarrhea  Gail Garcia - liver specialist  Pk Alvarez - for a diabetic eye exam

## 2019-02-12 NOTE — PROGRESS NOTES
Georges Gosselin is a 79year old female. HPI:   Patient presents for the following issues. 1. Elevated smooth muscle Ab: her older sister was diagnosed with PBC. 2. NEW Diabetes: she is dissapointmed. Has a daughter with T1DM.  She is not exercising b dizzyness, LOC, falls. Has been having headaches.    VISION: denies any blurred or double vision  RESPIRATORY: denies shortness of breath, cough, or congestion  CARDIOVASCULAR: denies chest pain, pressure or palpitations  GI: see HPI  : no dysuria, freque nourished, obesity, in no apparent distress  SKIN: no rashes, no suspicious lesions  HEENT: atraumatic, MMM, throat is clear  NECK: supple, no jvd, no thyromegaly, no cervical lad  LUNGS: clear to auscultation bilateraly, no c/w/r  CARDIO: RRR without g/m/ reinforced importance of nutrition and exercise. # Marital Issues: she is working on things with spouse and counseling.    # HTN: well cont metoprolol 50 BID   # HLP: doing well on atorvastatin  # Basal Cell Carcinoma: has been referred to derm  # Obesity

## 2019-02-13 DIAGNOSIS — E11.9 TYPE 2 DIABETES MELLITUS WITHOUT COMPLICATION, WITHOUT LONG-TERM CURRENT USE OF INSULIN (HCC): Primary | ICD-10-CM

## 2019-02-15 ENCOUNTER — LAB ENCOUNTER (OUTPATIENT)
Dept: LAB | Age: 69
End: 2019-02-15
Attending: INTERNAL MEDICINE
Payer: MEDICARE

## 2019-02-15 DIAGNOSIS — E11.9 TYPE 2 DIABETES MELLITUS WITHOUT COMPLICATION, WITHOUT LONG-TERM CURRENT USE OF INSULIN (HCC): ICD-10-CM

## 2019-02-15 DIAGNOSIS — R19.7 DIARRHEA, UNSPECIFIED TYPE: ICD-10-CM

## 2019-02-15 LAB
CREAT UR-SCNC: 280 MG/DL
MICROALBUMIN UR-MCNC: 5.23 MG/DL
MICROALBUMIN/CREAT 24H UR-RTO: 18.7 UG/MG (ref ?–30)

## 2019-02-15 PROCEDURE — 87046 STOOL CULTR AEROBIC BACT EA: CPT

## 2019-02-15 PROCEDURE — 87427 SHIGA-LIKE TOXIN AG IA: CPT

## 2019-02-15 PROCEDURE — 89055 LEUKOCYTE ASSESSMENT FECAL: CPT

## 2019-02-15 PROCEDURE — 87045 FECES CULTURE AEROBIC BACT: CPT

## 2019-02-15 PROCEDURE — 82570 ASSAY OF URINE CREATININE: CPT

## 2019-02-15 PROCEDURE — 82043 UR ALBUMIN QUANTITATIVE: CPT

## 2019-02-15 PROCEDURE — 87493 C DIFF AMPLIFIED PROBE: CPT

## 2019-03-07 ENCOUNTER — HOSPITAL ENCOUNTER (OUTPATIENT)
Age: 69
Discharge: HOME | End: 2019-03-07
Payer: MEDICARE

## 2019-03-07 VITALS
DIASTOLIC BLOOD PRESSURE: 74 MMHG | SYSTOLIC BLOOD PRESSURE: 168 MMHG | RESPIRATION RATE: 20 BRPM | HEART RATE: 63 BPM | OXYGEN SATURATION: 95 % | TEMPERATURE: 98 F

## 2019-03-07 DIAGNOSIS — M62.830 SPASM OF MUSCLE OF LOWER BACK: Primary | ICD-10-CM

## 2019-03-07 LAB
POCT BILIRUBIN URINE: NEGATIVE
POCT BLOOD URINE: NEGATIVE
POCT GLUCOSE URINE: NEGATIVE MG/DL
POCT KETONE URINE: NEGATIVE MG/DL
POCT NITRITE URINE: NEGATIVE
POCT PH URINE: 6.5 (ref 5–8)
POCT PROTEIN URINE: NEGATIVE MG/DL
POCT SPECIFIC GRAVITY URINE: 1.03
POCT URINE CLARITY: CLEAR
POCT URINE COLOR: YELLOW
POCT UROBILINOGEN URINE: 0.2 MG/DL

## 2019-03-07 PROCEDURE — 87186 SC STD MICRODIL/AGAR DIL: CPT | Performed by: PHYSICIAN ASSISTANT

## 2019-03-07 PROCEDURE — 87086 URINE CULTURE/COLONY COUNT: CPT | Performed by: PHYSICIAN ASSISTANT

## 2019-03-07 PROCEDURE — 87088 URINE BACTERIA CULTURE: CPT | Performed by: PHYSICIAN ASSISTANT

## 2019-03-07 PROCEDURE — 99214 OFFICE O/P EST MOD 30 MIN: CPT

## 2019-03-07 PROCEDURE — 81002 URINALYSIS NONAUTO W/O SCOPE: CPT | Performed by: PHYSICIAN ASSISTANT

## 2019-03-07 RX ORDER — METHYLPREDNISOLONE 4 MG/1
TABLET ORAL
Qty: 1 PACKAGE | Refills: 0 | Status: SHIPPED | OUTPATIENT
Start: 2019-03-07 | End: 2019-03-18 | Stop reason: ALTCHOICE

## 2019-03-07 RX ORDER — CYCLOBENZAPRINE HCL 10 MG
5 TABLET ORAL 3 TIMES DAILY PRN
Qty: 20 TABLET | Refills: 0 | Status: SHIPPED | OUTPATIENT
Start: 2019-03-07 | End: 2019-03-14

## 2019-03-07 NOTE — ED PROVIDER NOTES
Patient Seen in: [facility]    History   Patient presents with:  Back Pain (musculoskeletal)    Stated Complaint: back pain x 4 days    HPI    Laura Calzada is a 16-year-old female who presents today for evaluation of low back pain.   She problem.     Social History    Tobacco Use      Smoking status: Never Smoker      Smokeless tobacco: Never Used    Alcohol use: No      Frequency: Never      Comment: nothing since 12/25/2018    Drug use: No      Review of Systems    Positive for stated com take NSAIDs due to her long-standing GI issues. She is also given flexeril but is warned about the danger of this medication in people who are over the age of 72.   Her  is with her and states he can stay with her while she is taking the medicatio

## 2019-03-07 NOTE — ED INITIAL ASSESSMENT (HPI)
Sunday awkwardly left car, twisted back - no pain. Monday mild low back pain in am, became more severe spasming pain in evening. States low back pain increases upon sitting on toilet.   Has tried moist heat and TENS unit

## 2019-03-08 RX ORDER — CEPHALEXIN 500 MG/1
500 CAPSULE ORAL 3 TIMES DAILY
Qty: 30 CAPSULE | Refills: 0 | Status: SHIPPED | OUTPATIENT
Start: 2019-03-08 | End: 2019-03-18

## 2019-03-09 NOTE — ED NOTES
Patient called and verified name and . Dr Mila Saeed wanted to inform patient of preliminary urine culture results that show > 100, 000 CFU/ML Escherichia coli.   An electronic Rx of Keflex 500 mg 1 capsule 3 times a day for 10 days was sent to patients

## 2019-03-18 ENCOUNTER — APPOINTMENT (OUTPATIENT)
Dept: LAB | Age: 69
End: 2019-03-18
Attending: INTERNAL MEDICINE
Payer: MEDICARE

## 2019-03-18 ENCOUNTER — OFFICE VISIT (OUTPATIENT)
Dept: SURGERY | Facility: CLINIC | Age: 69
End: 2019-03-18
Payer: MEDICARE

## 2019-03-18 VITALS
TEMPERATURE: 97 F | WEIGHT: 173.5 LBS | DIASTOLIC BLOOD PRESSURE: 74 MMHG | SYSTOLIC BLOOD PRESSURE: 147 MMHG | BODY MASS INDEX: 30.74 KG/M2 | HEIGHT: 63 IN | HEART RATE: 64 BPM | OXYGEN SATURATION: 98 %

## 2019-03-18 DIAGNOSIS — R79.89 ELEVATED LIVER FUNCTION TESTS: ICD-10-CM

## 2019-03-18 DIAGNOSIS — R79.89 ELEVATED LIVER FUNCTION TESTS: Primary | ICD-10-CM

## 2019-03-18 LAB
ALBUMIN SERPL-MCNC: 3.5 G/DL (ref 3.4–5)
ALP LIVER SERPL-CCNC: 177 U/L (ref 55–142)
ALT SERPL-CCNC: 84 U/L (ref 13–56)
AST SERPL-CCNC: 40 U/L (ref 15–37)
BILIRUB DIRECT SERPL-MCNC: 0.2 MG/DL (ref 0–0.2)
BILIRUB SERPL-MCNC: 0.8 MG/DL (ref 0.1–2)
M PROTEIN MFR SERPL ELPH: 7.2 G/DL (ref 6.4–8.2)

## 2019-03-18 PROCEDURE — 36415 COLL VENOUS BLD VENIPUNCTURE: CPT

## 2019-03-18 PROCEDURE — 83516 IMMUNOASSAY NONANTIBODY: CPT

## 2019-03-18 PROCEDURE — 80076 HEPATIC FUNCTION PANEL: CPT

## 2019-03-18 RX ORDER — ZOSTER VACCINE RECOMBINANT, ADJUVANTED 50 MCG/0.5
KIT INTRAMUSCULAR
Refills: 1 | COMMUNITY
Start: 2019-03-01 | End: 2019-05-24

## 2019-03-18 NOTE — PROGRESS NOTES
[facility]  [address], [address]  [address]  [phone] ([phone]) (Other) Sister         PBC   • Other (Other) Sister         alcoholism   • Other (essential thrombocytosis) Sister       Social History    Tobacco Use      Smoking status: Never Smoker      Smokeless tobacco: Never Used    Alcohol use: No      Frequency: N have more plan    ADDENDUM: LFT today no better. Discussed with patient and will proceed with liver biopsy. Stephen Humphreys MD  Director of Hepatology  Medical Director of [address] [place] [address]

## 2019-03-19 LAB — MITOCHONDRIAL M2 AB, IGG: 88.9 UNITS

## 2019-03-27 ENCOUNTER — APPOINTMENT (OUTPATIENT)
Dept: LAB | Facility: HOSPITAL | Age: 69
End: 2019-03-27
Attending: INTERNAL MEDICINE
Payer: MEDICARE

## 2019-03-27 ENCOUNTER — HOSPITAL ENCOUNTER (OUTPATIENT)
Dept: ULTRASOUND IMAGING | Facility: HOSPITAL | Age: 69
Discharge: HOME | End: 2019-03-27
Attending: INTERNAL MEDICINE
Payer: MEDICARE

## 2019-03-27 VITALS
SYSTOLIC BLOOD PRESSURE: 125 MMHG | TEMPERATURE: 98 F | OXYGEN SATURATION: 98 % | HEIGHT: 63 IN | RESPIRATION RATE: 14 BRPM | DIASTOLIC BLOOD PRESSURE: 60 MMHG | BODY MASS INDEX: 30.65 KG/M2 | WEIGHT: 173 LBS | HEART RATE: 58 BPM

## 2019-03-27 DIAGNOSIS — R79.89 ELEVATED LIVER FUNCTION TESTS: Primary | ICD-10-CM

## 2019-03-27 DIAGNOSIS — R79.89 ELEVATED LIVER FUNCTION TESTS: ICD-10-CM

## 2019-03-27 PROCEDURE — 88313 SPECIAL STAINS GROUP 2: CPT | Performed by: INTERNAL MEDICINE

## 2019-03-27 PROCEDURE — 99152 MOD SED SAME PHYS/QHP 5/>YRS: CPT | Performed by: INTERNAL MEDICINE

## 2019-03-27 PROCEDURE — 47000 NEEDLE BIOPSY OF LIVER PERQ: CPT | Performed by: INTERNAL MEDICINE

## 2019-03-27 PROCEDURE — 85610 PROTHROMBIN TIME: CPT

## 2019-03-27 PROCEDURE — 36415 COLL VENOUS BLD VENIPUNCTURE: CPT

## 2019-03-27 PROCEDURE — 82962 GLUCOSE BLOOD TEST: CPT

## 2019-03-27 PROCEDURE — 76942 ECHO GUIDE FOR BIOPSY: CPT | Performed by: INTERNAL MEDICINE

## 2019-03-27 PROCEDURE — 88307 TISSUE EXAM BY PATHOLOGIST: CPT | Performed by: INTERNAL MEDICINE

## 2019-03-27 PROCEDURE — 85027 COMPLETE CBC AUTOMATED: CPT

## 2019-03-27 RX ORDER — FLUMAZENIL 0.1 MG/ML
INJECTION, SOLUTION INTRAVENOUS
Status: DISCONTINUED
Start: 2019-03-27 | End: 2019-03-27 | Stop reason: WASHOUT

## 2019-03-27 RX ORDER — NALOXONE HYDROCHLORIDE 0.4 MG/ML
INJECTION, SOLUTION INTRAMUSCULAR; INTRAVENOUS; SUBCUTANEOUS
Status: DISCONTINUED
Start: 2019-03-27 | End: 2019-03-27 | Stop reason: WASHOUT

## 2019-03-27 RX ORDER — MIDAZOLAM HYDROCHLORIDE 1 MG/ML
INJECTION INTRAMUSCULAR; INTRAVENOUS
Status: COMPLETED
Start: 2019-03-27 | End: 2019-03-27

## 2019-03-27 RX ORDER — SODIUM CHLORIDE 9 MG/ML
INJECTION, SOLUTION INTRAVENOUS CONTINUOUS
Status: DISCONTINUED | OUTPATIENT
Start: 2019-03-27 | End: 2019-03-29

## 2019-03-27 RX ADMIN — MIDAZOLAM HYDROCHLORIDE 1 MG: 1 INJECTION INTRAMUSCULAR; INTRAVENOUS at 11:00:00

## 2019-03-27 RX ADMIN — MIDAZOLAM HYDROCHLORIDE 1 MG: 1 INJECTION INTRAMUSCULAR; INTRAVENOUS at 11:18:00

## 2019-03-27 NOTE — IMAGING NOTE
Report given to [facility] RN. Mid abd dressing dry and intact. Pt transported to [facility] by cart. Total time of sedation 13 min.

## 2019-03-27 NOTE — PROCEDURES
[facility]  Procedure Note    Rodney Shorten Patient Status:  Outpatient    12/10/1950 MRN [id number]   Location [address] [place] [address] Attending Fany Blakely MD   Hosp Day # 0 PCP Alec Gomez MD     Procedure: Liver biopsy    Pre-Procedur

## 2019-04-09 ENCOUNTER — TELEPHONE (OUTPATIENT)
Dept: SURGERY | Facility: CLINIC | Age: 69
End: 2019-04-09

## 2019-04-09 DIAGNOSIS — K74.3 PRIMARY BILIARY CHOLANGITIS (HCC): ICD-10-CM

## 2019-04-09 DIAGNOSIS — R79.89 ELEVATED LIVER FUNCTION TESTS: Primary | ICD-10-CM

## 2019-04-09 RX ORDER — URSODIOL 300 MG/1
600 CAPSULE ORAL 2 TIMES DAILY
Qty: 120 CAPSULE | Refills: 11 | Status: SHIPPED | OUTPATIENT
Start: 2019-04-09 | End: 2020-02-26

## 2019-04-09 NOTE — TELEPHONE ENCOUNTER
Discussed biopsy results with patient. Sample size small (3 portal tracts) so difficult to draw any conclusions. AMA+ and SMA+.  Seems less likely to have an element of AIH as her steroid treatment for back pain did not seem to result in any improvement of

## 2019-04-23 ENCOUNTER — APPOINTMENT (OUTPATIENT)
Dept: LAB | Age: 69
End: 2019-04-23
Attending: INTERNAL MEDICINE
Payer: MEDICARE

## 2019-04-23 DIAGNOSIS — K52.831 COLLAGENOUS COLITIS: ICD-10-CM

## 2019-04-23 PROCEDURE — 36415 COLL VENOUS BLD VENIPUNCTURE: CPT

## 2019-04-23 PROCEDURE — 82784 ASSAY IGA/IGD/IGG/IGM EACH: CPT

## 2019-04-23 PROCEDURE — 83516 IMMUNOASSAY NONANTIBODY: CPT

## 2019-05-01 DIAGNOSIS — F33.1 MDD (MAJOR DEPRESSIVE DISORDER), RECURRENT EPISODE, MODERATE (HCC): ICD-10-CM

## 2019-05-01 DIAGNOSIS — I10 ESSENTIAL HYPERTENSION: ICD-10-CM

## 2019-05-01 DIAGNOSIS — E78.2 MIXED HYPERLIPIDEMIA: ICD-10-CM

## 2019-05-01 RX ORDER — ATORVASTATIN CALCIUM 20 MG/1
TABLET, FILM COATED ORAL
Qty: 90 TABLET | Refills: 0 | Status: SHIPPED | OUTPATIENT
Start: 2019-05-01 | End: 2019-07-25

## 2019-05-01 NOTE — TELEPHONE ENCOUNTER
Refill requested:   Requested Prescriptions     Pending Prescriptions Disp Refills   • ATORVASTATIN 20 MG Oral Tab [Pharmacy Med Name: Atorvastatin Calcium Oral Tablet 20 MG] 90 tablet 0     Sig: TAKE 1 TABLET BY MOUTH ONE TIME A DAY   Last refill: 1/28/20

## 2019-05-14 ENCOUNTER — APPOINTMENT (OUTPATIENT)
Dept: LAB | Age: 69
End: 2019-05-14
Attending: INTERNAL MEDICINE
Payer: MEDICARE

## 2019-05-14 DIAGNOSIS — R19.7 DIARRHEA, UNSPECIFIED TYPE: ICD-10-CM

## 2019-05-14 DIAGNOSIS — E11.9 TYPE 2 DIABETES MELLITUS WITHOUT COMPLICATION, WITHOUT LONG-TERM CURRENT USE OF INSULIN (HCC): ICD-10-CM

## 2019-05-14 PROCEDURE — 36415 COLL VENOUS BLD VENIPUNCTURE: CPT

## 2019-05-14 PROCEDURE — 83036 HEMOGLOBIN GLYCOSYLATED A1C: CPT

## 2019-05-17 ENCOUNTER — TELEPHONE (OUTPATIENT)
Dept: INTERNAL MEDICINE CLINIC | Facility: CLINIC | Age: 69
End: 2019-05-17

## 2019-05-17 NOTE — TELEPHONE ENCOUNTER
----- Message from Main Mckoy MD sent at 5/15/2019  1:12 PM CDT -----  Patient needs to start metformin. Please review [employer] message with her. Please order metformin 500 mg tablet BID for 90 day supply to the pharmacy patient chooses.  TY

## 2019-05-24 ENCOUNTER — OFFICE VISIT (OUTPATIENT)
Dept: INTERNAL MEDICINE CLINIC | Facility: CLINIC | Age: 69
End: 2019-05-24
Payer: MEDICARE

## 2019-05-24 VITALS
DIASTOLIC BLOOD PRESSURE: 60 MMHG | HEIGHT: 63 IN | SYSTOLIC BLOOD PRESSURE: 138 MMHG | OXYGEN SATURATION: 91 % | BODY MASS INDEX: 31.85 KG/M2 | RESPIRATION RATE: 14 BRPM | WEIGHT: 179.75 LBS | TEMPERATURE: 98 F | HEART RATE: 67 BPM

## 2019-05-24 DIAGNOSIS — S39.012A BACK STRAIN, INITIAL ENCOUNTER: Primary | ICD-10-CM

## 2019-05-24 DIAGNOSIS — I10 ESSENTIAL HYPERTENSION: ICD-10-CM

## 2019-05-24 DIAGNOSIS — E78.2 MIXED HYPERLIPIDEMIA: ICD-10-CM

## 2019-05-24 DIAGNOSIS — R74.01 TRANSAMINITIS: ICD-10-CM

## 2019-05-24 DIAGNOSIS — E11.9 TYPE 2 DIABETES MELLITUS WITHOUT COMPLICATION, WITHOUT LONG-TERM CURRENT USE OF INSULIN (HCC): ICD-10-CM

## 2019-05-24 PROCEDURE — 99214 OFFICE O/P EST MOD 30 MIN: CPT | Performed by: INTERNAL MEDICINE

## 2019-05-24 NOTE — PATIENT INSTRUCTIONS
Please have your diabetic eye exam as soon as possible. Please wait until mid August to repeat [facility]. You can repeat Faiza Lantigua's labs whenever he deems it appropriate.      If your diarrhea is not improved from the metformin in 7 days, ple

## 2019-05-24 NOTE — PROGRESS NOTES
Yvonne Wheeler is a 79year old female. HPI:   Patient presents for the following issues. 1. Right flank pain: does not feel like muscle. Has been having bouts of pain for years but it is now constant. Today it is 4.5/10 pain level.  Started worsening Resolves after layign in bed at night.           Wt Readings from Last 6 Encounters:  05/24/19 : 179 lb 12 oz  04/22/19 : 170 lb  03/19/19 : 173 lb  03/18/19 : 173 lb 8 oz  02/12/19 : 177 lb 3.2 oz  01/02/19 : 178 lb        Latex                   RASH  Asa (36.9 °C)   Resp 14   Ht 63\"   Wt 179 lb 12 oz   SpO2 91%   BMI 31.84 kg/m²   GENERAL: A&O well developed, well nourished, obese, in no apparent distress  SKIN: no rashes, no suspicious lesions  HEENT: atraumatic, MMM, throat is clear  NECK: supple, no jv and exercise. # HTN: well cont metoprolol 50 BID   # HLP: doing well on atorvastatin  # Basal Cell Carcinoma: has been referred to derm  # Obesity, BMI 31: hopeful she will practice better lifestyle once her mood improves.    # Health Maintenance: medicar

## 2019-06-04 ENCOUNTER — APPOINTMENT (OUTPATIENT)
Dept: LAB | Age: 69
End: 2019-06-04
Attending: Other
Payer: MEDICARE

## 2019-06-04 DIAGNOSIS — Z79.899 ENCOUNTER FOR MONITORING TRICYCLIC ANTIDEPRESSANT THERAPY: ICD-10-CM

## 2019-06-04 DIAGNOSIS — E78.2 MIXED HYPERLIPIDEMIA: ICD-10-CM

## 2019-06-04 DIAGNOSIS — I10 ESSENTIAL HYPERTENSION: ICD-10-CM

## 2019-06-04 DIAGNOSIS — R74.01 TRANSAMINITIS: ICD-10-CM

## 2019-06-04 DIAGNOSIS — Z51.81 ENCOUNTER FOR MONITORING TRICYCLIC ANTIDEPRESSANT THERAPY: ICD-10-CM

## 2019-06-04 DIAGNOSIS — E11.9 TYPE 2 DIABETES MELLITUS WITHOUT COMPLICATION, WITHOUT LONG-TERM CURRENT USE OF INSULIN (HCC): ICD-10-CM

## 2019-06-04 PROCEDURE — 83036 HEMOGLOBIN GLYCOSYLATED A1C: CPT

## 2019-06-04 PROCEDURE — 80048 BASIC METABOLIC PNL TOTAL CA: CPT

## 2019-06-04 PROCEDURE — 80076 HEPATIC FUNCTION PANEL: CPT

## 2019-06-04 PROCEDURE — 80061 LIPID PANEL: CPT

## 2019-06-04 PROCEDURE — 36415 COLL VENOUS BLD VENIPUNCTURE: CPT

## 2019-06-04 PROCEDURE — 80335 ANTIDEPRESSANT TRICYCLIC 1/2: CPT

## 2019-06-17 ENCOUNTER — TELEPHONE (OUTPATIENT)
Dept: INTERNAL MEDICINE CLINIC | Facility: CLINIC | Age: 69
End: 2019-06-17

## 2019-07-03 ENCOUNTER — APPOINTMENT (OUTPATIENT)
Dept: LAB | Age: 69
End: 2019-07-03
Attending: INTERNAL MEDICINE
Payer: MEDICARE

## 2019-07-03 DIAGNOSIS — R79.89 ELEVATED LIVER FUNCTION TESTS: ICD-10-CM

## 2019-07-03 LAB
ALBUMIN SERPL-MCNC: 3.4 G/DL (ref 3.4–5)
ALP LIVER SERPL-CCNC: 186 U/L (ref 55–142)
ALT SERPL-CCNC: 61 U/L (ref 13–56)
AST SERPL-CCNC: 32 U/L (ref 15–37)
BILIRUB DIRECT SERPL-MCNC: 0.1 MG/DL (ref 0–0.2)
BILIRUB SERPL-MCNC: 0.3 MG/DL (ref 0.1–2)
M PROTEIN MFR SERPL ELPH: 6.9 G/DL (ref 6.4–8.2)

## 2019-07-03 PROCEDURE — 80076 HEPATIC FUNCTION PANEL: CPT

## 2019-07-03 PROCEDURE — 36415 COLL VENOUS BLD VENIPUNCTURE: CPT

## 2019-07-15 ENCOUNTER — OFFICE VISIT (OUTPATIENT)
Dept: SURGERY | Facility: CLINIC | Age: 69
End: 2019-07-15
Payer: MEDICARE

## 2019-07-15 VITALS
DIASTOLIC BLOOD PRESSURE: 82 MMHG | HEART RATE: 72 BPM | RESPIRATION RATE: 16 BRPM | OXYGEN SATURATION: 99 % | BODY MASS INDEX: 32 KG/M2 | WEIGHT: 179 LBS | SYSTOLIC BLOOD PRESSURE: 151 MMHG

## 2019-07-15 DIAGNOSIS — R74.8 ELEVATED LIVER ENZYMES: Primary | ICD-10-CM

## 2019-07-15 NOTE — PROGRESS NOTES
[facility]  [address], [address]  [address]  [phone] ([phone]) of skin    • Calculus of kidney    • Diabetes ([facility] [address]    • Diarrhea, unspecified    • Essential hypertension    • Fatigue    • Feeling lonely    • High cholesterol    • History of depression    • Hyperlipidemia    • Irregular bowel habits    • Personal histo Polycarbophil (FIBER) 625 MG Oral Tab Take 1 tablet by mouth 2 (two) times daily.  Disp:  Rfl:        Allergies:   Latex                   RASH  Asacol [Mesalamine]     RASH  Atenolol                RASH  Codeine                 NAUSEA AND VOMITING  Nickel sample. - For itching, apply hydrocortisone more often - 2 to 3 daily.  - Encouraged weight loss to preserve liver function.  - Check HAV HBV immunity - deferred to next visit. - Follow up in 3 months       Patient discussed with and seen by Dr. Doug Jean.

## 2019-07-22 PROBLEM — K76.0 FATTY LIVER DETERMINED BY BIOPSY: Status: ACTIVE | Noted: 2019-07-22

## 2019-07-25 DIAGNOSIS — E78.2 MIXED HYPERLIPIDEMIA: ICD-10-CM

## 2019-07-25 DIAGNOSIS — I10 ESSENTIAL HYPERTENSION: ICD-10-CM

## 2019-07-25 DIAGNOSIS — F33.1 MDD (MAJOR DEPRESSIVE DISORDER), RECURRENT EPISODE, MODERATE (HCC): ICD-10-CM

## 2019-07-25 RX ORDER — ATORVASTATIN CALCIUM 20 MG/1
TABLET, FILM COATED ORAL
Qty: 90 TABLET | Refills: 0 | Status: SHIPPED | OUTPATIENT
Start: 2019-07-25 | End: 2019-10-18

## 2019-07-25 NOTE — TELEPHONE ENCOUNTER
ATORVASTATIN 20 MG     Last OV relevant to medication: 5/24/2019    Last refill date: 5/1/2019 # 90 tabs with 0 refills     When pt was asked to return for OV:  4 months     Upcoming appt/reason: 8/20/2019    Labs:   6/4/19 10:26 AM     Cholesterol, Total

## 2019-08-10 NOTE — TELEPHONE ENCOUNTER
Metformin  mg Oral Tab    Passed Protocol     Last OV relevant to medication: 5/24/2019    Last refill date: 5/17/2019     #/refills: #180 w/ 0 refills    When pt was asked to return for OV: 4 months     Upcoming appt/reason: 8/20/2019    Lab Result

## 2019-08-12 NOTE — TELEPHONE ENCOUNTER
Patient told me she did not tolerate metformin. We started jardiance in July. Please clarify with patient and make sure she tells pharmacy to stop sending refil requests for metformin.

## 2019-09-03 RX ORDER — METOPROLOL TARTRATE 50 MG/1
TABLET, FILM COATED ORAL
Qty: 180 TABLET | Refills: 3 | OUTPATIENT
Start: 2019-09-03

## 2019-09-12 NOTE — PROGRESS NOTES
Merlin Spitz is a 74year old female. HPI:   Patient presents for the following issues. 1. HTN: doing well on metoprolol  2. Obese: weight is down 2 lbs. She is not doing formal exercise. 3. MDD: doing very well with counseling.  Doing well with p Comment: arthroscopy of left shoulder  1977/1979: OTHER SURGICAL HISTORY      Comment: laparatomy   Social History:    Smoking status: Never Smoker                                                              Smokeless tobacco: Never Used Osteoporosis - DEXA is normal. Cont dietary calcium/vit D3, weight bearing exercises. Vaccines - prevnar 13 12/2017. Cont yearly flu shot. Advised on shingrix and TDAP. Will give pneumovac 23 in December, 2018. Hep C Screening - ab negative in 2018. [Consultation] : a consultation visit [Patient] : patient [Medical Records] : medical records [Mother] : mother

## 2019-09-16 ENCOUNTER — APPOINTMENT (OUTPATIENT)
Dept: LAB | Age: 69
End: 2019-09-16
Attending: INTERNAL MEDICINE
Payer: MEDICARE

## 2019-09-16 DIAGNOSIS — E11.9 TYPE 2 DIABETES MELLITUS WITHOUT COMPLICATION, WITHOUT LONG-TERM CURRENT USE OF INSULIN (HCC): ICD-10-CM

## 2019-09-16 LAB
EST. AVERAGE GLUCOSE BLD GHB EST-MCNC: 160 MG/DL (ref 68–126)
HBA1C MFR BLD HPLC: 7.2 % (ref ?–5.7)

## 2019-09-16 PROCEDURE — 36415 COLL VENOUS BLD VENIPUNCTURE: CPT

## 2019-09-16 PROCEDURE — 83036 HEMOGLOBIN GLYCOSYLATED A1C: CPT

## 2019-10-07 ENCOUNTER — OFFICE VISIT (OUTPATIENT)
Dept: INTERNAL MEDICINE CLINIC | Facility: CLINIC | Age: 69
End: 2019-10-07
Payer: MEDICARE

## 2019-10-07 VITALS
SYSTOLIC BLOOD PRESSURE: 124 MMHG | WEIGHT: 171.5 LBS | HEART RATE: 63 BPM | RESPIRATION RATE: 12 BRPM | HEIGHT: 62.75 IN | OXYGEN SATURATION: 98 % | DIASTOLIC BLOOD PRESSURE: 66 MMHG | TEMPERATURE: 98 F | BODY MASS INDEX: 30.77 KG/M2

## 2019-10-07 DIAGNOSIS — E11.9 TYPE 2 DIABETES MELLITUS WITHOUT COMPLICATION, WITHOUT LONG-TERM CURRENT USE OF INSULIN (HCC): Primary | ICD-10-CM

## 2019-10-07 DIAGNOSIS — R74.01 TRANSAMINITIS: ICD-10-CM

## 2019-10-07 DIAGNOSIS — C44.319 BASAL CELL CARCINOMA (BCC) OF SKIN OF OTHER PART OF FACE: ICD-10-CM

## 2019-10-07 DIAGNOSIS — F33.1 MDD (MAJOR DEPRESSIVE DISORDER), RECURRENT EPISODE, MODERATE (HCC): ICD-10-CM

## 2019-10-07 DIAGNOSIS — I10 ESSENTIAL HYPERTENSION: ICD-10-CM

## 2019-10-07 DIAGNOSIS — E66.09 CLASS 1 OBESITY DUE TO EXCESS CALORIES WITH SERIOUS COMORBIDITY AND BODY MASS INDEX (BMI) OF 30.0 TO 30.9 IN ADULT: ICD-10-CM

## 2019-10-07 DIAGNOSIS — E78.2 MIXED HYPERLIPIDEMIA: ICD-10-CM

## 2019-10-07 PROBLEM — E66.811 CLASS 1 OBESITY DUE TO EXCESS CALORIES WITH SERIOUS COMORBIDITY AND BODY MASS INDEX (BMI) OF 30.0 TO 30.9 IN ADULT: Status: ACTIVE | Noted: 2019-10-07

## 2019-10-07 PROCEDURE — 99214 OFFICE O/P EST MOD 30 MIN: CPT | Performed by: INTERNAL MEDICINE

## 2019-10-07 NOTE — PROGRESS NOTES
Rashid Dietz is a 79year old female. HPI:   Patient presents for the following issues. 1. Transaminitis: still undergoing w/u for autoimmune hepatitis. 2. Collagenous Colitis and diarrhea: on notrtyptiline which is helping greatly  3.  MDD: doin Date   • Anxiety    • Basal cell carcinoma (BCC) in situ of skin    • Calculus of kidney    • Diabetes ([facility] [address]    • Diarrhea, unspecified    • Essential hypertension    • Fatigue    • Feeling lonely    • High cholesterol    • History of depression    • Hyper predominant: following with GI. Doing much better on notrtyptiline. # Transaminitis, elevated alk phos and smooth muscle Ab: hepatology suspects PBC but could have AIH. Liver biopsy was suboptimal for diagnosis. Currently on ursodiol.    # Type 2 Diabetes negative in 2018. Healthcare Living Will and Medical POA: she will bring in papers. She plans to change her medical POA b/c thinks her  is the one right now. Discussed goals of care and resources provided.      Care Team -  GI: Tod Wong  Prev

## 2019-10-08 ENCOUNTER — HOSPITAL ENCOUNTER (OUTPATIENT)
Dept: ULTRASOUND IMAGING | Age: 69
Discharge: HOME | End: 2019-10-08
Attending: INTERNAL MEDICINE
Payer: MEDICARE

## 2019-10-08 ENCOUNTER — LAB ENCOUNTER (OUTPATIENT)
Dept: LAB | Age: 69
End: 2019-10-08
Attending: INTERNAL MEDICINE
Payer: MEDICARE

## 2019-10-08 DIAGNOSIS — R74.8 ELEVATED LIVER ENZYMES: ICD-10-CM

## 2019-10-08 PROCEDURE — 36415 COLL VENOUS BLD VENIPUNCTURE: CPT

## 2019-10-08 PROCEDURE — 76981 USE PARENCHYMA: CPT | Performed by: INTERNAL MEDICINE

## 2019-10-08 PROCEDURE — 80053 COMPREHEN METABOLIC PANEL: CPT

## 2019-10-08 PROCEDURE — 76705 ECHO EXAM OF ABDOMEN: CPT | Performed by: INTERNAL MEDICINE

## 2019-10-08 PROCEDURE — 85025 COMPLETE CBC W/AUTO DIFF WBC: CPT

## 2019-10-16 ENCOUNTER — TELEPHONE (OUTPATIENT)
Dept: INTERNAL MEDICINE CLINIC | Facility: CLINIC | Age: 69
End: 2019-10-16

## 2019-10-16 ENCOUNTER — OFFICE VISIT (OUTPATIENT)
Dept: SURGERY | Facility: CLINIC | Age: 69
End: 2019-10-16
Payer: MEDICARE

## 2019-10-16 VITALS
DIASTOLIC BLOOD PRESSURE: 83 MMHG | OXYGEN SATURATION: 99 % | HEART RATE: 75 BPM | RESPIRATION RATE: 18 BRPM | SYSTOLIC BLOOD PRESSURE: 145 MMHG | BODY MASS INDEX: 31 KG/M2 | WEIGHT: 171 LBS

## 2019-10-16 DIAGNOSIS — K76.0 NAFLD (NONALCOHOLIC FATTY LIVER DISEASE): Primary | ICD-10-CM

## 2019-10-16 NOTE — TELEPHONE ENCOUNTER
Form completed. Call placed to patient who prefers to  form. Placed form at  for . Copy in teal accordion file.

## 2019-10-16 NOTE — TELEPHONE ENCOUNTER
Patient dropped form for patient assistance program for prescription medication; placed fax folder for doctor review

## 2019-10-16 NOTE — PROGRESS NOTES
[facility]  [address], [address]  [address]  [phone] ([phone]) glasses        Past Surgical History:   Procedure Laterality Date   • CHOLECYSTECTOMY     • COLONOSCOPY  2008   • CYST ASPIRATION RIGHT      2007 APROX   • ERCP,DIAGNOSTIC     • HYSTERECTOMY  1998   • NEEDLE BIOPSY LIVER     • OOPHORECTOMY      1998   • OT RASH    Social History    Socioeconomic History      Marital status:       Spouse name: Not on file      Number of children: Not on file      Years of education: Not on file      Highest education level: Not on file    Tobacco Use      Smoking stat Additionally it is accompanied by rash on neck. Both unusual for pruritis caused by liver disease.  - Encouraged continued weight loss to preserve liver function.  - Check HAV HBV immunity - deferred to next visit.     - Follow up in 6 months       Patient

## 2019-10-18 DIAGNOSIS — E78.2 MIXED HYPERLIPIDEMIA: ICD-10-CM

## 2019-10-18 DIAGNOSIS — F33.1 MDD (MAJOR DEPRESSIVE DISORDER), RECURRENT EPISODE, MODERATE (HCC): ICD-10-CM

## 2019-10-18 DIAGNOSIS — I10 ESSENTIAL HYPERTENSION: ICD-10-CM

## 2019-10-18 RX ORDER — ATORVASTATIN CALCIUM 20 MG/1
TABLET, FILM COATED ORAL
Qty: 90 TABLET | Refills: 3 | Status: SHIPPED | OUTPATIENT
Start: 2019-10-18 | End: 2020-12-14

## 2019-10-18 NOTE — TELEPHONE ENCOUNTER
ATORVASTATIN 20 MG     Last OV relevant to medication:10-7-2019     Last refill date: 7- #90 tabs with 0 refills      When pt was asked to return for OV: 3 months     Upcoming appt/reason:1/15/2020     Labs: 6-4-2019 --lipid       HLP: doing well on

## 2019-10-30 ENCOUNTER — NURSE ONLY (OUTPATIENT)
Dept: ENDOCRINOLOGY CLINIC | Facility: CLINIC | Age: 69
End: 2019-10-30
Payer: MEDICARE

## 2019-10-30 VITALS — HEIGHT: 63 IN | BODY MASS INDEX: 31.01 KG/M2 | WEIGHT: 175 LBS

## 2019-10-30 DIAGNOSIS — E11.9 TYPE 2 DIABETES MELLITUS WITHOUT COMPLICATION, WITHOUT LONG-TERM CURRENT USE OF INSULIN (HCC): Primary | ICD-10-CM

## 2019-10-30 PROCEDURE — G0108 DIAB MANAGE TRN  PER INDIV: HCPCS

## 2019-10-30 NOTE — PROGRESS NOTES
Alma Grady  : 12/10/1950 attended Step 1 Diabetic Education:    Date: 10/30/2019  Referring Provider: Robert Hanna  Start time: 1:30pm End time: 2:30pm    Ht 63\"   Wt 175 lb (79.4 kg)   BMI 31.00 kg/m²     HgbA1C (%)   Date Value   2019 7. 2

## 2019-10-31 ENCOUNTER — NURSE ONLY (OUTPATIENT)
Dept: ENDOCRINOLOGY CLINIC | Facility: CLINIC | Age: 69
End: 2019-10-31
Payer: MEDICARE

## 2019-10-31 DIAGNOSIS — E11.9 TYPE 2 DIABETES MELLITUS WITHOUT COMPLICATION, WITHOUT LONG-TERM CURRENT USE OF INSULIN (HCC): Primary | ICD-10-CM

## 2019-10-31 PROCEDURE — G0109 DIAB MANAGE TRN IND/GROUP: HCPCS

## 2019-10-31 RX ORDER — LANCETS 33 GAUGE
1 EACH MISCELLANEOUS 2 TIMES DAILY
Qty: 1 BOX | Refills: 3 | Status: SHIPPED | OUTPATIENT
Start: 2019-10-31 | End: 2020-10-30

## 2019-10-31 RX ORDER — BLOOD SUGAR DIAGNOSTIC
1 STRIP MISCELLANEOUS 2 TIMES DAILY
Qty: 100 STRIP | Refills: 3 | Status: SHIPPED | OUTPATIENT
Start: 2019-10-31 | End: 2020-05-28

## 2019-10-31 NOTE — PROGRESS NOTES
August Huffman  [id number] attended Step 4 Group Diabetes Education Class:     Date: 10/31/2019  Start time: 9am End time: 11am    Wt:   Weight change since start of program: none noted yet she just started the program yesterday.     Blood Glucose: No

## 2019-11-01 ENCOUNTER — PATIENT MESSAGE (OUTPATIENT)
Dept: INTERNAL MEDICINE CLINIC | Facility: CLINIC | Age: 69
End: 2019-11-01

## 2019-11-01 DIAGNOSIS — E78.2 MIXED HYPERLIPIDEMIA: ICD-10-CM

## 2019-11-01 DIAGNOSIS — I10 ESSENTIAL HYPERTENSION: ICD-10-CM

## 2019-11-01 DIAGNOSIS — F33.1 MDD (MAJOR DEPRESSIVE DISORDER), RECURRENT EPISODE, MODERATE (HCC): ICD-10-CM

## 2019-11-03 RX ORDER — METOPROLOL TARTRATE 50 MG/1
50 TABLET, FILM COATED ORAL 2 TIMES DAILY
Qty: 180 TABLET | Refills: 1 | Status: SHIPPED | OUTPATIENT
Start: 2019-11-03 | End: 2020-04-13

## 2019-11-03 NOTE — TELEPHONE ENCOUNTER
From: Melissa Coleman  To: Jennifer Apodaca MD  Sent: 11/1/2019 7:53 PM CDT  Subject: Prescription Question    Hi,   Could you please send a new script with refills for Metoprolol 50 mg. BID to [facility]? I have no more refills- Thanks, Beckie Isabel.

## 2019-11-03 NOTE — TELEPHONE ENCOUNTER
Per last ov w Dr Carlitos Lara on 10/7/19:  \"# HTN: well cont metoprolol 50 BID\" (medication refilled)    Dr Carlitos Lara last CMP:    BUN: 22  Creat: 1.33  BUN/CREAT Ratio: 16.5  GFR: 42    Pt has upcoming ov on 1/15/19.

## 2019-11-05 NOTE — TELEPHONE ENCOUNTER
Patient is not allergic to this medication and has never indicated this to us. I have filled out pharmacy paperwork with explaination. It is in my outbox.

## 2019-11-05 NOTE — TELEPHONE ENCOUNTER
Per [facility] caremark they will like clarification on medication due to a possible allergy to medication would like signature with clarification. Paperwork on Dr. Joy Comer in basket.

## 2019-11-07 ENCOUNTER — NURSE ONLY (OUTPATIENT)
Dept: ENDOCRINOLOGY CLINIC | Facility: CLINIC | Age: 69
End: 2019-11-07
Payer: MEDICARE

## 2019-11-07 DIAGNOSIS — E11.9 TYPE 2 DIABETES MELLITUS WITHOUT COMPLICATION, WITHOUT LONG-TERM CURRENT USE OF INSULIN (HCC): Primary | ICD-10-CM

## 2019-11-07 PROCEDURE — G0109 DIAB MANAGE TRN IND/GROUP: HCPCS | Performed by: DIETITIAN, REGISTERED

## 2019-11-07 NOTE — PROGRESS NOTES
Munira Brown  [id number] attended Step 5 Class: Heart Healthy Diet, Exercise, Stress Management    Date: 11/7/2019  Referring Provider: Dr. Ann Marie Desai Start time: 9:00 End time: 11:00    The patient participated during the class: her daughter has had Typ

## 2019-11-21 ENCOUNTER — NURSE ONLY (OUTPATIENT)
Dept: ENDOCRINOLOGY CLINIC | Facility: CLINIC | Age: 69
End: 2019-11-21
Payer: MEDICARE

## 2019-11-21 DIAGNOSIS — E11.9 TYPE 2 DIABETES MELLITUS WITHOUT COMPLICATION, WITHOUT LONG-TERM CURRENT USE OF INSULIN (HCC): Primary | ICD-10-CM

## 2019-11-21 PROCEDURE — G0109 DIAB MANAGE TRN IND/GROUP: HCPCS | Performed by: DIETITIAN, REGISTERED

## 2019-11-21 NOTE — PROGRESS NOTES
Rosanna Cruz  [id number] attended Step 2 Class: Pathophysiology of Diabetes, Types of Diabetes, Sources of Carbohydrate, Exercise, Blood Glucose Targets     Date: 11/21/2019  Referring Provider: Dr Alma Delia Ventura time: 8:30 End time: 10:30    The ranulfo

## 2019-12-05 ENCOUNTER — NURSE ONLY (OUTPATIENT)
Dept: ENDOCRINOLOGY CLINIC | Facility: CLINIC | Age: 69
End: 2019-12-05
Payer: MEDICARE

## 2019-12-05 VITALS — WEIGHT: 175 LBS | HEIGHT: 63 IN | BODY MASS INDEX: 31.01 KG/M2

## 2019-12-05 DIAGNOSIS — E11.9 TYPE 2 DIABETES MELLITUS WITHOUT COMPLICATION, WITHOUT LONG-TERM CURRENT USE OF INSULIN (HCC): Primary | ICD-10-CM

## 2019-12-05 PROCEDURE — G0109 DIAB MANAGE TRN IND/GROUP: HCPCS

## 2019-12-05 NOTE — PROGRESS NOTES
Claudine Valadez  [id number] attended Step 3 Class: Carbohydrate Counting, Medications, Treating Highs/Lows    Date: 12/5/2019  Referring Provider: Tanya Nava  Start time: 9am End time: 11am    The patient participated during the class: She shared deon

## 2019-12-05 NOTE — TELEPHONE ENCOUNTER
Patient calling in, stated the patient's assistance program representative stated they will need Dr Edison Gallagher office to contact them, for release of patient's prescription.     [address]  T: [phone]  Case Number: [facility] [id number]

## 2019-12-09 NOTE — TELEPHONE ENCOUNTER
Patient calling in, stated the patient's assistance program representative stated they will need Dr Chetna Sevilla office to contact them, for release of patient's prescription.     [address]  T: [phone]  Case Number: [facility] [id number]

## 2019-12-16 NOTE — TELEPHONE ENCOUNTER
Call placed to [facility] spoke with Aron .    Transferred to pharmacy. to give Verbal order. Spoke with Eryn Kumari Verbal order given. lvm # 1 --patient to inform verbal was given.

## 2020-01-08 ENCOUNTER — APPOINTMENT (OUTPATIENT)
Dept: LAB | Age: 70
End: 2020-01-08
Attending: INTERNAL MEDICINE
Payer: MEDICARE

## 2020-01-08 DIAGNOSIS — E11.9 TYPE 2 DIABETES MELLITUS WITHOUT COMPLICATION, WITHOUT LONG-TERM CURRENT USE OF INSULIN (HCC): ICD-10-CM

## 2020-01-08 LAB
EST. AVERAGE GLUCOSE BLD GHB EST-MCNC: 163 MG/DL (ref 68–126)
HBA1C MFR BLD HPLC: 7.3 % (ref ?–5.7)

## 2020-01-08 PROCEDURE — 83036 HEMOGLOBIN GLYCOSYLATED A1C: CPT

## 2020-01-08 PROCEDURE — 36415 COLL VENOUS BLD VENIPUNCTURE: CPT

## 2020-01-15 ENCOUNTER — OFFICE VISIT (OUTPATIENT)
Dept: INTERNAL MEDICINE CLINIC | Facility: CLINIC | Age: 70
End: 2020-01-15
Payer: MEDICARE

## 2020-01-15 ENCOUNTER — HOSPITAL ENCOUNTER (OUTPATIENT)
Dept: GENERAL RADIOLOGY | Age: 70
Discharge: HOME | End: 2020-01-15
Attending: INTERNAL MEDICINE
Payer: MEDICARE

## 2020-01-15 VITALS
BODY MASS INDEX: 29.59 KG/M2 | SYSTOLIC BLOOD PRESSURE: 122 MMHG | DIASTOLIC BLOOD PRESSURE: 64 MMHG | HEIGHT: 63 IN | WEIGHT: 167 LBS | RESPIRATION RATE: 16 BRPM | TEMPERATURE: 98 F | OXYGEN SATURATION: 98 % | HEART RATE: 83 BPM

## 2020-01-15 DIAGNOSIS — I15.2 HYPERTENSION ASSOCIATED WITH DIABETES (HCC): ICD-10-CM

## 2020-01-15 DIAGNOSIS — K59.00 CONSTIPATION, UNSPECIFIED CONSTIPATION TYPE: ICD-10-CM

## 2020-01-15 DIAGNOSIS — Z12.31 VISIT FOR SCREENING MAMMOGRAM: ICD-10-CM

## 2020-01-15 DIAGNOSIS — E11.59 HYPERTENSION ASSOCIATED WITH DIABETES (HCC): ICD-10-CM

## 2020-01-15 DIAGNOSIS — R74.01 TRANSAMINITIS: ICD-10-CM

## 2020-01-15 DIAGNOSIS — E11.9 TYPE 2 DIABETES MELLITUS WITHOUT COMPLICATION, WITHOUT LONG-TERM CURRENT USE OF INSULIN (HCC): ICD-10-CM

## 2020-01-15 DIAGNOSIS — Z13.31 SCREENING FOR DEPRESSION: ICD-10-CM

## 2020-01-15 DIAGNOSIS — F33.1 MDD (MAJOR DEPRESSIVE DISORDER), RECURRENT EPISODE, MODERATE (HCC): ICD-10-CM

## 2020-01-15 DIAGNOSIS — E66.9 DIABETES MELLITUS TYPE 2 IN OBESE (HCC): ICD-10-CM

## 2020-01-15 DIAGNOSIS — L57.0 ACTINIC KERATOSIS: ICD-10-CM

## 2020-01-15 DIAGNOSIS — E11.69 HYPERLIPIDEMIA ASSOCIATED WITH TYPE 2 DIABETES MELLITUS (HCC): ICD-10-CM

## 2020-01-15 DIAGNOSIS — E66.09 CLASS 1 OBESITY DUE TO EXCESS CALORIES WITH SERIOUS COMORBIDITY AND BODY MASS INDEX (BMI) OF 30.0 TO 30.9 IN ADULT: ICD-10-CM

## 2020-01-15 DIAGNOSIS — E11.69 DIABETES MELLITUS TYPE 2 IN OBESE (HCC): ICD-10-CM

## 2020-01-15 DIAGNOSIS — E78.5 HYPERLIPIDEMIA ASSOCIATED WITH TYPE 2 DIABETES MELLITUS (HCC): ICD-10-CM

## 2020-01-15 DIAGNOSIS — R19.7 DIARRHEA, UNSPECIFIED TYPE: ICD-10-CM

## 2020-01-15 DIAGNOSIS — Z00.00 ROUTINE GENERAL MEDICAL EXAMINATION AT A HEALTH CARE FACILITY: Primary | ICD-10-CM

## 2020-01-15 PROCEDURE — G0444 DEPRESSION SCREEN ANNUAL: HCPCS | Performed by: INTERNAL MEDICINE

## 2020-01-15 PROCEDURE — G0439 PPPS, SUBSEQ VISIT: HCPCS | Performed by: INTERNAL MEDICINE

## 2020-01-15 PROCEDURE — 74018 RADEX ABDOMEN 1 VIEW: CPT | Performed by: INTERNAL MEDICINE

## 2020-01-15 PROCEDURE — 99214 OFFICE O/P EST MOD 30 MIN: CPT | Performed by: INTERNAL MEDICINE

## 2020-01-15 NOTE — PROGRESS NOTES
Results reviewed. Released to [address] KUB shows fecal impaction. Told to start with PEG 3350, followed by Miralax if she can tolerate (suspect she has significant sphincter weakening). If not able, to switch to Benefiber. She is already scheduled for ARM.

## 2020-01-15 NOTE — PATIENT INSTRUCTIONS
You are due for your diabetic eye exam in June, 2020. Please repeat your labs the second week of April, at least 1 week prior to seeing Tonio Torres. You need 3 eight ounce servings of calcium/vitamin D daily.  This includes spinach, kale, broccoli, al

## 2020-01-15 NOTE — PROGRESS NOTES
Elizabeth Ma is a 81year old female. HPI:   Patient presents for medicare wellness exam and additional issues noted below. 1. Transaminitis: ursodiol was very expensive so has not taken any since end of October, 2019. Hepatology is aware.   2. D kg)        Latex                   RASH  Asacol [Mesalamine]     RASH  Atenolol                RASH  Codeine                 NAUSEA AND VOMITING  Nickel                  RASH    Family History   Problem Relation Age of Onset   • Diabetes Father    • Heart nourished, obese, in no apparent distress  SKIN: no rashes, no suspicious lesions  HEENT: atraumatic, MMM, throat is clear, bilateral TM with normal light reflex  NECK: supple, no jvd, no thyromegaly, no cervical lad  LUNGS: clear to auscultation bilateral resume counseling.   - reinforced importance of nutrition and exercise.    # HTN assoc with DM: well cont metoprolol 50 BID   # HLP  Assoc with DM: doing well on atorvastatin  # Basal Cell Carcinoma: has been referred to derm  # Obesity, BMI 30 assoc with D

## 2020-01-23 ENCOUNTER — HOSPITAL ENCOUNTER (OUTPATIENT)
Dept: MAMMOGRAPHY | Age: 70
Discharge: HOME | End: 2020-01-23
Attending: INTERNAL MEDICINE
Payer: MEDICARE

## 2020-01-23 DIAGNOSIS — Z12.31 VISIT FOR SCREENING MAMMOGRAM: ICD-10-CM

## 2020-01-23 PROCEDURE — 77063 BREAST TOMOSYNTHESIS BI: CPT | Performed by: INTERNAL MEDICINE

## 2020-01-23 PROCEDURE — 77067 SCR MAMMO BI INCL CAD: CPT | Performed by: INTERNAL MEDICINE

## 2020-01-28 ENCOUNTER — PATIENT OUTREACH (OUTPATIENT)
Dept: CASE MANAGEMENT | Age: 70
End: 2020-01-28

## 2020-01-31 ENCOUNTER — HOSPITAL ENCOUNTER (OUTPATIENT)
Dept: MAMMOGRAPHY | Age: 70
Discharge: HOME | End: 2020-01-31
Attending: INTERNAL MEDICINE
Payer: MEDICARE

## 2020-01-31 ENCOUNTER — HOSPITAL ENCOUNTER (OUTPATIENT)
Dept: ULTRASOUND IMAGING | Age: 70
Discharge: HOME | End: 2020-01-31
Attending: INTERNAL MEDICINE
Payer: MEDICARE

## 2020-01-31 DIAGNOSIS — R92.8 ABNORMAL SCREENING MAMMOGRAM: ICD-10-CM

## 2020-01-31 PROCEDURE — 76642 ULTRASOUND BREAST LIMITED: CPT | Performed by: INTERNAL MEDICINE

## 2020-01-31 PROCEDURE — 77065 DX MAMMO INCL CAD UNI: CPT | Performed by: INTERNAL MEDICINE

## 2020-01-31 PROCEDURE — 77061 BREAST TOMOSYNTHESIS UNI: CPT | Performed by: INTERNAL MEDICINE

## 2020-03-02 NOTE — TELEPHONE ENCOUNTER
[facility] is calling to get a verbal script for the patient's prescription for jardiance 25 mg. Please advise.

## 2020-03-04 ENCOUNTER — PATIENT MESSAGE (OUTPATIENT)
Dept: INTERNAL MEDICINE CLINIC | Facility: CLINIC | Age: 70
End: 2020-03-04

## 2020-03-04 NOTE — TELEPHONE ENCOUNTER
From: Mykel Shannon  To: Sandra Hills MD  Sent: 3/4/2020 10:51 AM CST  Subject: Prescription Question    Hi,  I'm still trying to get my Jardiance 25mg. script filled through Patient Assistance at [employer] and have not been successful.  They

## 2020-03-04 NOTE — TELEPHONE ENCOUNTER
Verbal given to pharmacist, Fracisco Mariano.     Call placed to patient to inform of above.     Patient verbalized understanding.

## 2020-03-04 NOTE — TELEPHONE ENCOUNTER
Verbal given to pharmacistGerman. Call placed to patient to inform of above. Patient verbalized understanding.

## 2020-03-04 NOTE — TELEPHONE ENCOUNTER
Verbal given to pharmacist, Martell Salomon.     Call placed to patient to inform of above.     Patient verbalized understanding.    Consent (Temporal Branch)/Introductory Paragraph: The rationale for Mohs was explained to the patient and consent was obtained. The risks, benefits and alternatives to therapy were discussed in detail. Specifically, the risks of damage to the temporal branch of the facial nerve, infection, scarring, bleeding, prolonged wound healing, incomplete removal, allergy to anesthesia, and recurrence were addressed. Prior to the procedure, the treatment site was clearly identified and confirmed by the patient. All components of Universal Protocol/PAUSE Rule completed.

## 2020-03-04 NOTE — TELEPHONE ENCOUNTER
From: Jj Medley  To: Connie Domingo MD  Sent: 3/4/2020 10:55 AM CST  Subject: Prescription Question    Hi, Still need a order for Jardiance 25mg called into [address] [place] [address] ([phone]).  I know you've sent it before but they don't have it and

## 2020-03-04 NOTE — TELEPHONE ENCOUNTER
Empagliflozin (JARDIANCE) 25 MG Oral Tab    Last OV relevant to medication: 1-    Last refill date: 1- # 90 tabs with 3 refills     When pt was asked to return for OV: 6 months     Upcoming appt/reason:none scheduled     Labs: a1c, 1-8-2020

## 2020-04-11 DIAGNOSIS — I10 ESSENTIAL HYPERTENSION: ICD-10-CM

## 2020-04-11 DIAGNOSIS — E78.2 MIXED HYPERLIPIDEMIA: ICD-10-CM

## 2020-04-11 DIAGNOSIS — F33.1 MDD (MAJOR DEPRESSIVE DISORDER), RECURRENT EPISODE, MODERATE (HCC): ICD-10-CM

## 2020-04-13 RX ORDER — METOPROLOL TARTRATE 50 MG/1
TABLET, FILM COATED ORAL
Qty: 180 TABLET | Refills: 0 | Status: SHIPPED | OUTPATIENT
Start: 2020-04-13 | End: 2020-07-20

## 2020-04-14 ENCOUNTER — LAB ENCOUNTER (OUTPATIENT)
Dept: LAB | Age: 70
End: 2020-04-14
Attending: INTERNAL MEDICINE
Payer: MEDICARE

## 2020-04-14 DIAGNOSIS — E11.59 HYPERTENSION ASSOCIATED WITH DIABETES (HCC): ICD-10-CM

## 2020-04-14 DIAGNOSIS — E11.9 TYPE 2 DIABETES MELLITUS WITHOUT COMPLICATION, WITHOUT LONG-TERM CURRENT USE OF INSULIN (HCC): ICD-10-CM

## 2020-04-14 DIAGNOSIS — E11.69 HYPERLIPIDEMIA ASSOCIATED WITH TYPE 2 DIABETES MELLITUS (HCC): ICD-10-CM

## 2020-04-14 DIAGNOSIS — Z00.00 ROUTINE GENERAL MEDICAL EXAMINATION AT A HEALTH CARE FACILITY: ICD-10-CM

## 2020-04-14 DIAGNOSIS — K76.0 NAFLD (NONALCOHOLIC FATTY LIVER DISEASE): ICD-10-CM

## 2020-04-14 DIAGNOSIS — Z12.31 VISIT FOR SCREENING MAMMOGRAM: ICD-10-CM

## 2020-04-14 DIAGNOSIS — L57.0 ACTINIC KERATOSIS: ICD-10-CM

## 2020-04-14 DIAGNOSIS — E78.5 HYPERLIPIDEMIA ASSOCIATED WITH TYPE 2 DIABETES MELLITUS (HCC): ICD-10-CM

## 2020-04-14 DIAGNOSIS — I15.2 HYPERTENSION ASSOCIATED WITH DIABETES (HCC): ICD-10-CM

## 2020-04-14 PROCEDURE — 82043 UR ALBUMIN QUANTITATIVE: CPT

## 2020-04-14 PROCEDURE — 36415 COLL VENOUS BLD VENIPUNCTURE: CPT

## 2020-04-14 PROCEDURE — 83036 HEMOGLOBIN GLYCOSYLATED A1C: CPT

## 2020-04-14 PROCEDURE — 80053 COMPREHEN METABOLIC PANEL: CPT

## 2020-04-14 PROCEDURE — 85025 COMPLETE CBC W/AUTO DIFF WBC: CPT

## 2020-04-14 PROCEDURE — 82570 ASSAY OF URINE CREATININE: CPT

## 2020-04-15 DIAGNOSIS — K76.0 NAFLD (NONALCOHOLIC FATTY LIVER DISEASE): Primary | ICD-10-CM

## 2020-05-28 DIAGNOSIS — E11.9 TYPE 2 DIABETES MELLITUS WITHOUT COMPLICATION, WITHOUT LONG-TERM CURRENT USE OF INSULIN (HCC): ICD-10-CM

## 2020-05-28 RX ORDER — BLOOD SUGAR DIAGNOSTIC
1 STRIP MISCELLANEOUS 2 TIMES DAILY
Qty: 200 STRIP | Refills: 3 | Status: SHIPPED | OUTPATIENT
Start: 2020-05-28 | End: 2020-06-22

## 2020-05-28 NOTE — TELEPHONE ENCOUNTER
onetouch verio strips testing bid filled 10-3-19 100 strips with 3 refills     LOV 1-15-20   return in 6 months for DM.   No upcoming apt on file   Labs 4-14-20   HgbA1C  <5.7 % 6.6High

## 2020-06-05 ENCOUNTER — TELEPHONE (OUTPATIENT)
Dept: INTERNAL MEDICINE CLINIC | Facility: CLINIC | Age: 70
End: 2020-06-05

## 2020-06-05 NOTE — TELEPHONE ENCOUNTER
Patient called requesting refill for: Empagliflozin (Darene Salaam) 25 MG Oral Tab     Patient states that she usually get prescription through .

## 2020-06-05 NOTE — TELEPHONE ENCOUNTER
Patient called regarding prescription for One Touch Verio test strips.  Patient states that pharmacy is not filling because they need approval through [employer] [facility]

## 2020-06-11 ENCOUNTER — TELEPHONE (OUTPATIENT)
Dept: ENDOCRINOLOGY CLINIC | Facility: CLINIC | Age: 70
End: 2020-06-11

## 2020-06-11 DIAGNOSIS — E11.9 TYPE 2 DIABETES MELLITUS WITHOUT COMPLICATION, WITHOUT LONG-TERM CURRENT USE OF INSULIN (HCC): ICD-10-CM

## 2020-06-11 NOTE — TELEPHONE ENCOUNTER
Received a call from [facility] stating that they have faxed a medicare sheet requesting for strips for pt and emg 8 office keeps stamping the paper and sending back but they need a signature from dr Lucinda Gupta. Please advice.

## 2020-06-22 DIAGNOSIS — E11.9 TYPE 2 DIABETES MELLITUS WITHOUT COMPLICATION, WITHOUT LONG-TERM CURRENT USE OF INSULIN (HCC): ICD-10-CM

## 2020-06-23 RX ORDER — BLOOD SUGAR DIAGNOSTIC
1 STRIP MISCELLANEOUS 2 TIMES DAILY
Qty: 200 STRIP | Refills: 3 | Status: SHIPPED | OUTPATIENT
Start: 2020-06-23 | End: 2021-08-02

## 2020-06-24 ENCOUNTER — PATIENT MESSAGE (OUTPATIENT)
Dept: INTERNAL MEDICINE CLINIC | Facility: CLINIC | Age: 70
End: 2020-06-24

## 2020-06-24 NOTE — TELEPHONE ENCOUNTER
From: Rashid Dietz  To: Matt Arias MD  Sent: 6/24/2020 3:14 AM CDT  Subject: Prescription Question    Enrique Peters,  My script of Jardiance 25mg. needs to be ordered directly from the  's pharmacy since I receive financial assistance.  This s

## 2020-07-15 ENCOUNTER — LAB ENCOUNTER (OUTPATIENT)
Dept: LAB | Age: 70
End: 2020-07-15
Attending: INTERNAL MEDICINE
Payer: MEDICARE

## 2020-07-15 DIAGNOSIS — K76.0 NAFLD (NONALCOHOLIC FATTY LIVER DISEASE): ICD-10-CM

## 2020-07-15 LAB
ALBUMIN SERPL-MCNC: 3.6 G/DL (ref 3.4–5)
ALBUMIN/GLOB SERPL: 0.9 {RATIO} (ref 1–2)
ALP LIVER SERPL-CCNC: 200 U/L (ref 55–142)
ALT SERPL-CCNC: 34 U/L (ref 13–56)
ANION GAP SERPL CALC-SCNC: 5 MMOL/L (ref 0–18)
AST SERPL-CCNC: 25 U/L (ref 15–37)
BASOPHILS # BLD AUTO: 0.08 X10(3) UL (ref 0–0.2)
BASOPHILS NFR BLD AUTO: 1.6 %
BILIRUB SERPL-MCNC: 0.7 MG/DL (ref 0.1–2)
BUN BLD-MCNC: 17 MG/DL (ref 7–18)
BUN/CREAT SERPL: 12.8 (ref 10–20)
CALCIUM BLD-MCNC: 8.8 MG/DL (ref 8.5–10.1)
CHLORIDE SERPL-SCNC: 108 MMOL/L (ref 98–112)
CO2 SERPL-SCNC: 28 MMOL/L (ref 21–32)
CREAT BLD-MCNC: 1.33 MG/DL (ref 0.55–1.02)
DEPRECATED RDW RBC AUTO: 46.2 FL (ref 35.1–46.3)
EOSINOPHIL # BLD AUTO: 0.53 X10(3) UL (ref 0–0.7)
EOSINOPHIL NFR BLD AUTO: 10.9 %
ERYTHROCYTE [DISTWIDTH] IN BLOOD BY AUTOMATED COUNT: 13.6 % (ref 11–15)
GLOBULIN PLAS-MCNC: 4 G/DL (ref 2.8–4.4)
GLUCOSE BLD-MCNC: 114 MG/DL (ref 70–99)
HCT VFR BLD AUTO: 44.5 % (ref 35–48)
HGB BLD-MCNC: 13.8 G/DL (ref 12–16)
IMM GRANULOCYTES # BLD AUTO: 0.03 X10(3) UL (ref 0–1)
IMM GRANULOCYTES NFR BLD: 0.6 %
LYMPHOCYTES # BLD AUTO: 0.63 X10(3) UL (ref 1–4)
LYMPHOCYTES NFR BLD AUTO: 12.9 %
M PROTEIN MFR SERPL ELPH: 7.6 G/DL (ref 6.4–8.2)
MCH RBC QN AUTO: 28.7 PG (ref 26–34)
MCHC RBC AUTO-ENTMCNC: 31 G/DL (ref 31–37)
MCV RBC AUTO: 92.5 FL (ref 80–100)
MONOCYTES # BLD AUTO: 0.36 X10(3) UL (ref 0.1–1)
MONOCYTES NFR BLD AUTO: 7.4 %
NEUTROPHILS # BLD AUTO: 3.24 X10 (3) UL (ref 1.5–7.7)
NEUTROPHILS # BLD AUTO: 3.24 X10(3) UL (ref 1.5–7.7)
NEUTROPHILS NFR BLD AUTO: 66.6 %
OSMOLALITY SERPL CALC.SUM OF ELEC: 294 MOSM/KG (ref 275–295)
PATIENT FASTING Y/N/NP: NO
PLATELET # BLD AUTO: 239 10(3)UL (ref 150–450)
POTASSIUM SERPL-SCNC: 4.4 MMOL/L (ref 3.5–5.1)
RBC # BLD AUTO: 4.81 X10(6)UL (ref 3.8–5.3)
SODIUM SERPL-SCNC: 141 MMOL/L (ref 136–145)
WBC # BLD AUTO: 4.9 X10(3) UL (ref 4–11)

## 2020-07-15 PROCEDURE — 80053 COMPREHEN METABOLIC PANEL: CPT

## 2020-07-15 PROCEDURE — 85025 COMPLETE CBC W/AUTO DIFF WBC: CPT

## 2020-07-15 PROCEDURE — 36415 COLL VENOUS BLD VENIPUNCTURE: CPT

## 2020-07-20 ENCOUNTER — VIRTUAL PHONE E/M (OUTPATIENT)
Dept: SURGERY | Facility: CLINIC | Age: 70
End: 2020-07-20
Payer: MEDICARE

## 2020-07-20 DIAGNOSIS — I10 ESSENTIAL HYPERTENSION: ICD-10-CM

## 2020-07-20 DIAGNOSIS — F33.1 MDD (MAJOR DEPRESSIVE DISORDER), RECURRENT EPISODE, MODERATE (HCC): ICD-10-CM

## 2020-07-20 DIAGNOSIS — K76.0 NAFLD (NONALCOHOLIC FATTY LIVER DISEASE): Primary | ICD-10-CM

## 2020-07-20 DIAGNOSIS — E78.2 MIXED HYPERLIPIDEMIA: ICD-10-CM

## 2020-07-20 RX ORDER — METOPROLOL TARTRATE 50 MG/1
TABLET, FILM COATED ORAL
Qty: 180 TABLET | Refills: 0 | Status: SHIPPED | OUTPATIENT
Start: 2020-07-20 | End: 2020-10-16

## 2020-07-20 NOTE — TELEPHONE ENCOUNTER
Refill requested:   Requested Prescriptions     Pending Prescriptions Disp Refills   • METOPROLOL TARTRATE 50 MG Oral Tab [Pharmacy Med Name: METOPROLOL TAB TAR 50MG] 180 tablet 0     Sig: TAKE 1 TABLET TWICE A DAY       Last refill 4- #  180 tabs w

## 2020-07-21 NOTE — PROGRESS NOTES
[facility]  [address], [address]  [address]  [facility]  [phone] ([phone]) bleeding or confusion.             Past Medical History:   Diagnosis Date   • Anxiety     • Basal cell carcinoma (BCC) in situ of skin     • Calculus of kidney     • Diabetes ([facility] [address]     • Diarrhea, unspecified     • Essential hypertension     • Fatigue   lancet by Finger stick route 2 (two) times daily. , Disp: 1 Box, Rfl: 3  •  ATORVASTATIN 20 MG Oral Tab, TAKE 1 TABLET BY MOUTH ONE TIME A DAY , Disp: 90 tablet, Rfl: 3  •  cetirizine 10 MG Oral Tab, Take 10 mg by mouth daily. , Disp: , Rfl:   •  aspirin 81 months     Patient discussed with TOLU Elam  Nurse Practitioner  [facility] [address]  [facility] , [address] [place] [address], [address] [place] [address] (office)  [phone] (fax)  abr

## 2020-08-03 ENCOUNTER — OFFICE VISIT (OUTPATIENT)
Dept: INTERNAL MEDICINE CLINIC | Facility: CLINIC | Age: 70
End: 2020-08-03
Payer: MEDICARE

## 2020-08-03 VITALS
BODY MASS INDEX: 29.41 KG/M2 | WEIGHT: 166 LBS | DIASTOLIC BLOOD PRESSURE: 70 MMHG | HEIGHT: 63 IN | HEART RATE: 64 BPM | SYSTOLIC BLOOD PRESSURE: 128 MMHG | TEMPERATURE: 98 F

## 2020-08-03 DIAGNOSIS — E11.69 HYPERLIPIDEMIA ASSOCIATED WITH TYPE 2 DIABETES MELLITUS (HCC): ICD-10-CM

## 2020-08-03 DIAGNOSIS — E78.5 HYPERLIPIDEMIA ASSOCIATED WITH TYPE 2 DIABETES MELLITUS (HCC): ICD-10-CM

## 2020-08-03 DIAGNOSIS — E66.9 DIABETES MELLITUS TYPE 2 IN OBESE (HCC): Primary | ICD-10-CM

## 2020-08-03 DIAGNOSIS — Z12.31 VISIT FOR SCREENING MAMMOGRAM: ICD-10-CM

## 2020-08-03 DIAGNOSIS — F33.1 MDD (MAJOR DEPRESSIVE DISORDER), RECURRENT EPISODE, MODERATE (HCC): ICD-10-CM

## 2020-08-03 DIAGNOSIS — E11.9 TYPE 2 DIABETES MELLITUS WITHOUT COMPLICATION, WITHOUT LONG-TERM CURRENT USE OF INSULIN (HCC): ICD-10-CM

## 2020-08-03 DIAGNOSIS — E11.69 DIABETES MELLITUS TYPE 2 IN OBESE (HCC): Primary | ICD-10-CM

## 2020-08-03 PROCEDURE — 99214 OFFICE O/P EST MOD 30 MIN: CPT | Performed by: INTERNAL MEDICINE

## 2020-08-03 RX ORDER — MAGNESIUM OXIDE 400 MG (241.3 MG MAGNESIUM) TABLET
400 TABLET DAILY
COMMUNITY
End: 2020-12-21

## 2020-08-03 NOTE — PATIENT INSTRUCTIONS
You are overdue for your diabetic eye exam.    You are due for your mammogram in January, 2021. Please call central scheduling at [phone]. Please make sure you complete my lab orders with Katie Wray orders. You will need to be fasting.

## 2020-09-15 ENCOUNTER — LAB ENCOUNTER (OUTPATIENT)
Dept: LAB | Age: 70
End: 2020-09-15
Attending: NURSE PRACTITIONER
Payer: MEDICARE

## 2020-09-15 DIAGNOSIS — R10.12 LEFT UPPER QUADRANT PAIN: ICD-10-CM

## 2020-09-15 LAB
ALBUMIN SERPL-MCNC: 3.5 G/DL (ref 3.4–5)
ALBUMIN/GLOB SERPL: 0.9 {RATIO} (ref 1–2)
ALP LIVER SERPL-CCNC: 184 U/L (ref 55–142)
ALT SERPL-CCNC: 45 U/L (ref 13–56)
ANION GAP SERPL CALC-SCNC: 3 MMOL/L (ref 0–18)
AST SERPL-CCNC: 26 U/L (ref 15–37)
BASOPHILS # BLD AUTO: 0.09 X10(3) UL (ref 0–0.2)
BASOPHILS NFR BLD AUTO: 1.7 %
BILIRUB SERPL-MCNC: 0.6 MG/DL (ref 0.1–2)
BUN BLD-MCNC: 26 MG/DL (ref 7–18)
BUN/CREAT SERPL: 19.5 (ref 10–20)
CALCIUM BLD-MCNC: 8.8 MG/DL (ref 8.5–10.1)
CHLORIDE SERPL-SCNC: 109 MMOL/L (ref 98–112)
CO2 SERPL-SCNC: 27 MMOL/L (ref 21–32)
CREAT BLD-MCNC: 1.33 MG/DL (ref 0.55–1.02)
DEPRECATED RDW RBC AUTO: 50.1 FL (ref 35.1–46.3)
EOSINOPHIL # BLD AUTO: 0.64 X10(3) UL (ref 0–0.7)
EOSINOPHIL NFR BLD AUTO: 12 %
ERYTHROCYTE [DISTWIDTH] IN BLOOD BY AUTOMATED COUNT: 13.7 % (ref 11–15)
GLOBULIN PLAS-MCNC: 4 G/DL (ref 2.8–4.4)
GLUCOSE BLD-MCNC: 99 MG/DL (ref 70–99)
HCT VFR BLD AUTO: 46.7 % (ref 35–48)
HGB BLD-MCNC: 13.7 G/DL (ref 12–16)
IMM GRANULOCYTES # BLD AUTO: 0.05 X10(3) UL (ref 0–1)
IMM GRANULOCYTES NFR BLD: 0.9 %
LYMPHOCYTES # BLD AUTO: 0.69 X10(3) UL (ref 1–4)
LYMPHOCYTES NFR BLD AUTO: 12.9 %
M PROTEIN MFR SERPL ELPH: 7.5 G/DL (ref 6.4–8.2)
MCH RBC QN AUTO: 28.8 PG (ref 26–34)
MCHC RBC AUTO-ENTMCNC: 29.3 G/DL (ref 31–37)
MCV RBC AUTO: 98.1 FL (ref 80–100)
MONOCYTES # BLD AUTO: 0.41 X10(3) UL (ref 0.1–1)
MONOCYTES NFR BLD AUTO: 7.7 %
NEUTROPHILS # BLD AUTO: 3.47 X10 (3) UL (ref 1.5–7.7)
NEUTROPHILS # BLD AUTO: 3.47 X10(3) UL (ref 1.5–7.7)
NEUTROPHILS NFR BLD AUTO: 64.8 %
OSMOLALITY SERPL CALC.SUM OF ELEC: 293 MOSM/KG (ref 275–295)
PATIENT FASTING Y/N/NP: YES
PLATELET # BLD AUTO: 256 10(3)UL (ref 150–450)
POTASSIUM SERPL-SCNC: 4.2 MMOL/L (ref 3.5–5.1)
RBC # BLD AUTO: 4.76 X10(6)UL (ref 3.8–5.3)
SODIUM SERPL-SCNC: 139 MMOL/L (ref 136–145)
WBC # BLD AUTO: 5.4 X10(3) UL (ref 4–11)

## 2020-09-15 PROCEDURE — 36415 COLL VENOUS BLD VENIPUNCTURE: CPT

## 2020-09-15 PROCEDURE — 80053 COMPREHEN METABOLIC PANEL: CPT

## 2020-09-15 PROCEDURE — 85025 COMPLETE CBC W/AUTO DIFF WBC: CPT

## 2020-09-23 ENCOUNTER — HOSPITAL ENCOUNTER (OUTPATIENT)
Dept: CT IMAGING | Age: 70
Discharge: HOME | End: 2020-09-23
Attending: NURSE PRACTITIONER
Payer: MEDICARE

## 2020-09-23 DIAGNOSIS — R10.12 LEFT UPPER QUADRANT PAIN: ICD-10-CM

## 2020-09-23 PROCEDURE — 74177 CT ABD & PELVIS W/CONTRAST: CPT | Performed by: NURSE PRACTITIONER

## 2020-10-14 NOTE — PROGRESS NOTES
Faith Galeano is a 81year old female. HPI:   Patient presents for the following issues. 1. DM: tolerating jardiance well. Had issues with refills and was off jardiance for 1 month. Checks daily FBS and 3 hours after dinner.  FBS are usually < 130s Other (Other) Sister         alcoholism   • Other (essential thrombocytosis) Sister       Past Medical History:   Diagnosis Date   • Anxiety    • Basal cell carcinoma (BCC) in situ of skin    • Calculus of kidney    • Diabetes ([facility] [address]    • Diarrhea, unspecif Bowel Syndrome, diarrhea predominant: following with GI. No longer on notrtiptiline  # Transaminitis, elevated alk phos and smooth muscle Ab: hepatology suspects PBC but could have AIH. Liver biopsy was suboptimal for diagnosis.  Has been off ursodiol since prevention. Vaccines - up to date with prevnar 13 and pneumovac 23 and shingrix. TDAP 2018. Cont yearly flu shot. Hep C Screening - ab negative in 2018. Healthcare Living Will and Medical POA: resources provided again.   would be primary for Estimated Blood Loss (Cc): minimal

## 2020-10-16 DIAGNOSIS — I10 ESSENTIAL HYPERTENSION: ICD-10-CM

## 2020-10-16 DIAGNOSIS — E78.2 MIXED HYPERLIPIDEMIA: ICD-10-CM

## 2020-10-16 DIAGNOSIS — F33.1 MDD (MAJOR DEPRESSIVE DISORDER), RECURRENT EPISODE, MODERATE (HCC): ICD-10-CM

## 2020-10-16 RX ORDER — METOPROLOL TARTRATE 50 MG/1
TABLET, FILM COATED ORAL
Qty: 180 TABLET | Refills: 1 | Status: SHIPPED | OUTPATIENT
Start: 2020-10-16 | End: 2021-04-05

## 2020-10-16 NOTE — TELEPHONE ENCOUNTER
LOV: 8/3/2020 with Dr. Alysha Weir   RTC: 6 months  Last Relevant Labs: 9/15/2020   Filled: 7/20/2020   #180 with 0 refills    Future Appointments   Date Time Provider Yadi Oates   10/29/2020  2:00 PM MD JOSE HernandezMGTHEO Laguerre   1/7/2021

## 2020-12-04 ENCOUNTER — APPOINTMENT (OUTPATIENT)
Dept: LAB | Age: 70
End: 2020-12-04
Attending: INTERNAL MEDICINE
Payer: MEDICARE

## 2020-12-04 DIAGNOSIS — Z20.822 EXPOSURE TO COVID-19 VIRUS: ICD-10-CM

## 2020-12-07 ENCOUNTER — TELEPHONE (OUTPATIENT)
Dept: INTERNAL MEDICINE CLINIC | Facility: CLINIC | Age: 70
End: 2020-12-07

## 2020-12-14 DIAGNOSIS — I10 ESSENTIAL HYPERTENSION: ICD-10-CM

## 2020-12-14 DIAGNOSIS — F33.1 MDD (MAJOR DEPRESSIVE DISORDER), RECURRENT EPISODE, MODERATE (HCC): ICD-10-CM

## 2020-12-14 DIAGNOSIS — E78.2 MIXED HYPERLIPIDEMIA: ICD-10-CM

## 2020-12-15 RX ORDER — ATORVASTATIN CALCIUM 20 MG/1
20 TABLET, FILM COATED ORAL DAILY
Qty: 90 TABLET | Refills: 0 | Status: SHIPPED | OUTPATIENT
Start: 2020-12-15 | End: 2021-04-05

## 2020-12-31 ENCOUNTER — HOSPITAL ENCOUNTER (OUTPATIENT)
Dept: ULTRASOUND IMAGING | Age: 70
Discharge: HOME | End: 2020-12-31
Attending: INTERNAL MEDICINE
Payer: MEDICARE

## 2020-12-31 ENCOUNTER — LAB ENCOUNTER (OUTPATIENT)
Dept: LAB | Age: 70
End: 2020-12-31
Attending: INTERNAL MEDICINE
Payer: MEDICARE

## 2020-12-31 DIAGNOSIS — K76.0 NAFLD (NONALCOHOLIC FATTY LIVER DISEASE): ICD-10-CM

## 2020-12-31 DIAGNOSIS — E66.9 DIABETES MELLITUS TYPE 2 IN OBESE (HCC): ICD-10-CM

## 2020-12-31 DIAGNOSIS — E11.9 TYPE 2 DIABETES MELLITUS WITHOUT COMPLICATION, WITHOUT LONG-TERM CURRENT USE OF INSULIN (HCC): ICD-10-CM

## 2020-12-31 DIAGNOSIS — E11.69 DIABETES MELLITUS TYPE 2 IN OBESE (HCC): ICD-10-CM

## 2020-12-31 LAB
ALBUMIN SERPL-MCNC: 3.6 G/DL (ref 3.4–5)
ALP LIVER SERPL-CCNC: 192 U/L
ALT SERPL-CCNC: 36 U/L
AST SERPL-CCNC: 23 U/L (ref 15–37)
BILIRUB DIRECT SERPL-MCNC: 0.1 MG/DL (ref 0–0.2)
BILIRUB SERPL-MCNC: 0.6 MG/DL (ref 0.1–2)
CHOLEST SMN-MCNC: 150 MG/DL (ref ?–200)
EST. AVERAGE GLUCOSE BLD GHB EST-MCNC: 134 MG/DL (ref 68–126)
HBA1C MFR BLD HPLC: 6.3 % (ref ?–5.7)
HDLC SERPL-MCNC: 55 MG/DL (ref 40–59)
LDLC SERPL CALC-MCNC: 69 MG/DL (ref ?–100)
M PROTEIN MFR SERPL ELPH: 7.3 G/DL (ref 6.4–8.2)
NONHDLC SERPL-MCNC: 95 MG/DL (ref ?–130)
PATIENT FASTING Y/N/NP: YES
TRIGL SERPL-MCNC: 129 MG/DL (ref 30–149)
VLDLC SERPL CALC-MCNC: 26 MG/DL (ref 0–30)

## 2020-12-31 PROCEDURE — 80076 HEPATIC FUNCTION PANEL: CPT

## 2020-12-31 PROCEDURE — 80061 LIPID PANEL: CPT

## 2020-12-31 PROCEDURE — 76705 ECHO EXAM OF ABDOMEN: CPT | Performed by: INTERNAL MEDICINE

## 2020-12-31 PROCEDURE — 76981 USE PARENCHYMA: CPT | Performed by: INTERNAL MEDICINE

## 2020-12-31 PROCEDURE — 36415 COLL VENOUS BLD VENIPUNCTURE: CPT

## 2020-12-31 PROCEDURE — 83036 HEMOGLOBIN GLYCOSYLATED A1C: CPT

## 2021-01-01 ENCOUNTER — MED REC SCAN ONLY (OUTPATIENT)
Dept: INTERNAL MEDICINE CLINIC | Facility: CLINIC | Age: 71
End: 2021-01-01

## 2021-01-07 ENCOUNTER — OFFICE VISIT (OUTPATIENT)
Dept: SURGERY | Facility: CLINIC | Age: 71
End: 2021-01-07
Payer: COMMERCIAL

## 2021-01-07 VITALS
BODY MASS INDEX: 29.3 KG/M2 | OXYGEN SATURATION: 97 % | WEIGHT: 165.38 LBS | HEIGHT: 63 IN | SYSTOLIC BLOOD PRESSURE: 161 MMHG | RESPIRATION RATE: 16 BRPM | HEART RATE: 68 BPM | DIASTOLIC BLOOD PRESSURE: 83 MMHG

## 2021-01-07 DIAGNOSIS — R79.89 ELEVATED LIVER FUNCTION TESTS: Primary | ICD-10-CM

## 2021-01-10 NOTE — PROGRESS NOTES
[facility]  [facility], [address]  [address]  [phone] ([phone]) 2017   • Hemorrhoids 2007   • High cholesterol    • History of depression    • Hyperlipidemia    • Irregular bowel habits    • Itch of skin 2010   • Leaking of urine 2020   • Night sweats 2015   • Pain with bowel movements 2006   • Personal history of adul • cetirizine 10 MG Oral Tab Take 10 mg by mouth daily. • aspirin 81 MG Oral Tab Take 81 mg by mouth daily. • Acidophilus/Pectin Oral Cap Take 1 capsule by mouth daily.        Allergies:   Latex                   RASH  Asacol [Mesalamine]     RASH co-pay) so stopped Oct '19 .   - Will continue to monitor for fibrosis changes with periodic elastography - ordered for 2021  - Has made solid progress on weight loss; encouraged continued efforts  - Will confirm HAV HBV immunity; patient worked in health

## 2021-01-20 ENCOUNTER — OFFICE VISIT (OUTPATIENT)
Dept: INTERNAL MEDICINE CLINIC | Facility: CLINIC | Age: 71
End: 2021-01-20
Payer: MEDICARE

## 2021-01-20 ENCOUNTER — TELEPHONE (OUTPATIENT)
Dept: INTERNAL MEDICINE CLINIC | Facility: CLINIC | Age: 71
End: 2021-01-20

## 2021-01-20 VITALS
SYSTOLIC BLOOD PRESSURE: 128 MMHG | TEMPERATURE: 98 F | DIASTOLIC BLOOD PRESSURE: 70 MMHG | WEIGHT: 164 LBS | BODY MASS INDEX: 29.06 KG/M2 | HEIGHT: 63 IN | HEART RATE: 80 BPM | OXYGEN SATURATION: 98 % | RESPIRATION RATE: 16 BRPM

## 2021-01-20 DIAGNOSIS — R74.01 TRANSAMINITIS: ICD-10-CM

## 2021-01-20 DIAGNOSIS — E78.5 HYPERLIPIDEMIA ASSOCIATED WITH TYPE 2 DIABETES MELLITUS (HCC): ICD-10-CM

## 2021-01-20 DIAGNOSIS — Z13.31 SCREENING FOR DEPRESSION: ICD-10-CM

## 2021-01-20 DIAGNOSIS — E11.59 HYPERTENSION ASSOCIATED WITH DIABETES (HCC): ICD-10-CM

## 2021-01-20 DIAGNOSIS — E66.3 OVERWEIGHT (BMI 25.0-29.9): ICD-10-CM

## 2021-01-20 DIAGNOSIS — E11.9 TYPE 2 DIABETES MELLITUS WITHOUT COMPLICATION, WITHOUT LONG-TERM CURRENT USE OF INSULIN (HCC): ICD-10-CM

## 2021-01-20 DIAGNOSIS — E11.69 HYPERLIPIDEMIA ASSOCIATED WITH TYPE 2 DIABETES MELLITUS (HCC): ICD-10-CM

## 2021-01-20 DIAGNOSIS — Z00.00 ROUTINE GENERAL MEDICAL EXAMINATION AT A HEALTH CARE FACILITY: Primary | ICD-10-CM

## 2021-01-20 DIAGNOSIS — K76.0 FATTY LIVER DETERMINED BY BIOPSY: ICD-10-CM

## 2021-01-20 DIAGNOSIS — I15.2 HYPERTENSION ASSOCIATED WITH DIABETES (HCC): ICD-10-CM

## 2021-01-20 DIAGNOSIS — F33.1 MDD (MAJOR DEPRESSIVE DISORDER), RECURRENT EPISODE, MODERATE (HCC): ICD-10-CM

## 2021-01-20 PROBLEM — E66.811 CLASS 1 OBESITY DUE TO EXCESS CALORIES WITH SERIOUS COMORBIDITY AND BODY MASS INDEX (BMI) OF 30.0 TO 30.9 IN ADULT: Status: RESOLVED | Noted: 2019-10-07 | Resolved: 2021-01-20

## 2021-01-20 PROBLEM — E66.9 DIABETES MELLITUS TYPE 2 IN OBESE (HCC): Status: RESOLVED | Noted: 2020-01-15 | Resolved: 2021-01-20

## 2021-01-20 PROBLEM — E66.09 CLASS 1 OBESITY DUE TO EXCESS CALORIES WITH SERIOUS COMORBIDITY AND BODY MASS INDEX (BMI) OF 30.0 TO 30.9 IN ADULT: Status: RESOLVED | Noted: 2019-10-07 | Resolved: 2021-01-20

## 2021-01-20 PROBLEM — E66.9 DIABETES MELLITUS TYPE 2 IN OBESE: Status: RESOLVED | Noted: 2020-01-15 | Resolved: 2021-01-20

## 2021-01-20 PROBLEM — E66.9 DIABETES MELLITUS TYPE 2 IN OBESE  (HCC): Status: RESOLVED | Noted: 2020-01-15 | Resolved: 2021-01-20

## 2021-01-20 PROCEDURE — 3074F SYST BP LT 130 MM HG: CPT | Performed by: INTERNAL MEDICINE

## 2021-01-20 PROCEDURE — G0444 DEPRESSION SCREEN ANNUAL: HCPCS | Performed by: INTERNAL MEDICINE

## 2021-01-20 PROCEDURE — 3078F DIAST BP <80 MM HG: CPT | Performed by: INTERNAL MEDICINE

## 2021-01-20 PROCEDURE — 99214 OFFICE O/P EST MOD 30 MIN: CPT | Performed by: INTERNAL MEDICINE

## 2021-01-20 PROCEDURE — G0439 PPPS, SUBSEQ VISIT: HCPCS | Performed by: INTERNAL MEDICINE

## 2021-01-20 PROCEDURE — 3008F BODY MASS INDEX DOCD: CPT | Performed by: INTERNAL MEDICINE

## 2021-01-20 RX ORDER — EMPAGLIFLOZIN 25 MG/1
25 TABLET, FILM COATED ORAL DAILY
Qty: 21 TABLET | Refills: 0 | COMMUNITY
Start: 2021-01-20 | End: 2021-02-19

## 2021-01-20 NOTE — PROGRESS NOTES
Melissa Coleman is a 78year old female. HPI:   Patient presents for medicare wellness exam and additional issues noted below. 1. DM: checks FBS and prior to bedtime every day. FBS is usually < 130s. Prior to bedtiem is less than 180s.  Denies hypogl Heart Attack Father    • Cancer Mother         lung cancer   • Diabetes Daughter         Type 1   • Other (Other) Sister         PBC   • Other (Other) Sister         alcoholism   • Other (essential thrombocytosis) Sister       Past Medical History:   Diagn 29.05 kg/m²   Medicare Hearing Evaluation: bilateral rub test sounds slightly different in right ear.    GENERAL: A&O well developed, well nourished,in no apparent distress  SKIN: no rashes,no suspicious lesions  HEENT: atraumatic,   NECK: supple, no jvd, n metoprolol 50 BID   # HLP  Assoc with DM: doing well on atorvastatin  # Basal Cell Carcinoma: will re-establish with dermatolgoy   # Overweight assoc with DM: weight is stable.  Counseled again on healthy plant based nutrition, regular exercise, and practic

## 2021-01-20 NOTE — TELEPHONE ENCOUNTER
Call placed to Patient Assistance Program.     [phone]. Per Dr. Claudia Berkowitz;   re: Skip Notice. she states we do not need to fax anything for jardiance. just call and approve 90 tabs with 3 refills.     Called rx into Patient assistance program.

## 2021-01-29 DIAGNOSIS — Z23 NEED FOR VACCINATION: ICD-10-CM

## 2021-02-02 ENCOUNTER — HOSPITAL ENCOUNTER (OUTPATIENT)
Dept: MAMMOGRAPHY | Age: 71
Discharge: HOME | End: 2021-02-02
Attending: INTERNAL MEDICINE
Payer: MEDICARE

## 2021-02-02 DIAGNOSIS — Z12.31 VISIT FOR SCREENING MAMMOGRAM: ICD-10-CM

## 2021-02-02 PROCEDURE — 77067 SCR MAMMO BI INCL CAD: CPT | Performed by: INTERNAL MEDICINE

## 2021-02-02 PROCEDURE — 77063 BREAST TOMOSYNTHESIS BI: CPT | Performed by: INTERNAL MEDICINE

## 2021-03-22 ENCOUNTER — PATIENT MESSAGE (OUTPATIENT)
Dept: INTERNAL MEDICINE CLINIC | Facility: CLINIC | Age: 71
End: 2021-03-22

## 2021-03-23 NOTE — TELEPHONE ENCOUNTER
From: Craig Nelson  To: Geni Santiago MD  Sent: 3/22/2021 4:26 PM CDT  Subject: Other    Hi, Just want to let you know I have had my Covid shots thru our local community center. Moderna x2. If you need more details please let me know.     Thanks,  Yolanda Zaragoza

## 2021-04-04 DIAGNOSIS — F33.1 MDD (MAJOR DEPRESSIVE DISORDER), RECURRENT EPISODE, MODERATE (HCC): ICD-10-CM

## 2021-04-04 DIAGNOSIS — I10 ESSENTIAL HYPERTENSION: ICD-10-CM

## 2021-04-04 DIAGNOSIS — E78.2 MIXED HYPERLIPIDEMIA: ICD-10-CM

## 2021-04-05 DIAGNOSIS — E78.2 MIXED HYPERLIPIDEMIA: ICD-10-CM

## 2021-04-05 DIAGNOSIS — F33.1 MDD (MAJOR DEPRESSIVE DISORDER), RECURRENT EPISODE, MODERATE (HCC): ICD-10-CM

## 2021-04-05 DIAGNOSIS — I10 ESSENTIAL HYPERTENSION: ICD-10-CM

## 2021-04-05 RX ORDER — METOPROLOL TARTRATE 50 MG/1
50 TABLET, FILM COATED ORAL 2 TIMES DAILY
Qty: 180 TABLET | Refills: 2 | Status: SHIPPED | OUTPATIENT
Start: 2021-04-05 | End: 2022-01-04

## 2021-04-05 RX ORDER — ATORVASTATIN CALCIUM 20 MG/1
20 TABLET, FILM COATED ORAL DAILY
Qty: 90 TABLET | Refills: 2 | Status: SHIPPED | OUTPATIENT
Start: 2021-04-05 | End: 2022-01-21

## 2021-06-16 ENCOUNTER — LAB ENCOUNTER (OUTPATIENT)
Dept: LAB | Age: 71
End: 2021-06-16
Attending: INTERNAL MEDICINE
Payer: MEDICARE

## 2021-06-16 DIAGNOSIS — I15.2 HYPERTENSION ASSOCIATED WITH DIABETES (HCC): ICD-10-CM

## 2021-06-16 DIAGNOSIS — K76.0 FATTY LIVER DETERMINED BY BIOPSY: ICD-10-CM

## 2021-06-16 DIAGNOSIS — E11.9 TYPE 2 DIABETES MELLITUS WITHOUT COMPLICATION, WITHOUT LONG-TERM CURRENT USE OF INSULIN (HCC): ICD-10-CM

## 2021-06-16 DIAGNOSIS — E66.3 OVERWEIGHT (BMI 25.0-29.9): ICD-10-CM

## 2021-06-16 DIAGNOSIS — E78.5 HYPERLIPIDEMIA ASSOCIATED WITH TYPE 2 DIABETES MELLITUS (HCC): ICD-10-CM

## 2021-06-16 DIAGNOSIS — E11.59 HYPERTENSION ASSOCIATED WITH DIABETES (HCC): ICD-10-CM

## 2021-06-16 DIAGNOSIS — E11.69 HYPERLIPIDEMIA ASSOCIATED WITH TYPE 2 DIABETES MELLITUS (HCC): ICD-10-CM

## 2021-06-16 DIAGNOSIS — Z00.00 ROUTINE GENERAL MEDICAL EXAMINATION AT A HEALTH CARE FACILITY: ICD-10-CM

## 2021-06-16 DIAGNOSIS — F33.1 MDD (MAJOR DEPRESSIVE DISORDER), RECURRENT EPISODE, MODERATE (HCC): ICD-10-CM

## 2021-06-16 DIAGNOSIS — R74.01 TRANSAMINITIS: ICD-10-CM

## 2021-06-16 DIAGNOSIS — Z13.31 SCREENING FOR DEPRESSION: ICD-10-CM

## 2021-06-16 PROCEDURE — 83036 HEMOGLOBIN GLYCOSYLATED A1C: CPT

## 2021-06-16 PROCEDURE — 82043 UR ALBUMIN QUANTITATIVE: CPT

## 2021-06-16 PROCEDURE — 82570 ASSAY OF URINE CREATININE: CPT

## 2021-06-16 PROCEDURE — 36415 COLL VENOUS BLD VENIPUNCTURE: CPT

## 2021-06-16 PROCEDURE — 80048 BASIC METABOLIC PNL TOTAL CA: CPT

## 2021-06-16 PROCEDURE — 80076 HEPATIC FUNCTION PANEL: CPT

## 2021-07-31 DIAGNOSIS — E11.9 TYPE 2 DIABETES MELLITUS WITHOUT COMPLICATION, WITHOUT LONG-TERM CURRENT USE OF INSULIN (HCC): ICD-10-CM

## 2021-08-02 RX ORDER — BLOOD SUGAR DIAGNOSTIC
STRIP MISCELLANEOUS
Qty: 200 STRIP | Refills: 2 | Status: SHIPPED | OUTPATIENT
Start: 2021-08-02

## 2021-08-14 ENCOUNTER — LAB ENCOUNTER (OUTPATIENT)
Dept: LAB | Age: 71
End: 2021-08-14
Attending: INTERNAL MEDICINE
Payer: MEDICARE

## 2021-08-14 DIAGNOSIS — J02.9 SORE THROAT: ICD-10-CM

## 2021-08-14 DIAGNOSIS — G44.209 ACUTE NON INTRACTABLE TENSION-TYPE HEADACHE: ICD-10-CM

## 2021-08-15 LAB — SARS-COV-2 RNA RESP QL NAA+PROBE: NOT DETECTED

## 2021-10-23 ENCOUNTER — APPOINTMENT (OUTPATIENT)
Dept: GENERAL RADIOLOGY | Age: 71
End: 2021-10-23
Attending: EMERGENCY MEDICINE
Payer: MEDICARE

## 2021-10-23 ENCOUNTER — HOSPITAL ENCOUNTER (EMERGENCY)
Age: 71
Discharge: HOME | End: 2021-10-23
Attending: EMERGENCY MEDICINE
Payer: MEDICARE

## 2021-10-23 VITALS
DIASTOLIC BLOOD PRESSURE: 75 MMHG | HEIGHT: 63 IN | HEART RATE: 72 BPM | SYSTOLIC BLOOD PRESSURE: 151 MMHG | OXYGEN SATURATION: 99 % | TEMPERATURE: 99 F | BODY MASS INDEX: 28.35 KG/M2 | RESPIRATION RATE: 18 BRPM | WEIGHT: 160 LBS

## 2021-10-23 DIAGNOSIS — S80.12XA CONTUSION OF LEFT LOWER EXTREMITY, INITIAL ENCOUNTER: Primary | ICD-10-CM

## 2021-10-23 PROCEDURE — 73590 X-RAY EXAM OF LOWER LEG: CPT | Performed by: EMERGENCY MEDICINE

## 2021-10-23 PROCEDURE — 99283 EMERGENCY DEPT VISIT LOW MDM: CPT

## 2021-10-23 NOTE — ED PROVIDER NOTES
Patient Seen in: THE [facility] In [place]      History   Patient presents with:  Fall: last sunday    Stated Complaint:     Subjective:   HPI    This is a 19-year-old female who was going down the stairs.   She said it was the last 2 step BIOPSY LIVER     • OOPHORECTOMY      1998   • OTHER SURGICAL HISTORY  2008, 2004    arthroscopy of left knee   • OTHER SURGICAL HISTORY      left ankle fracture   • OTHER SURGICAL HISTORY  1992    arthroscopy of left shoulder   • OTHER SURGICAL HISTORY  21 normal strength sensory exam is grossly normal.    Neuro: Alert and oriented. The patient is moving all extremities there is no focal findings.     ED Course   Labs Reviewed - No data to display                MDM      XR TIBIA + FIBULA (2 VIEWS), LEFT (CP

## 2021-10-23 NOTE — ED INITIAL ASSESSMENT (HPI)
Twisted r ankle and pain to left tib fib after walking down stairs. Denies any other injuries. +bruising noted. Pt ambulatory. Pt denies hitting head or neck pain. On baby asa daily.

## 2021-10-29 ENCOUNTER — TELEPHONE (OUTPATIENT)
Dept: INTERNAL MEDICINE CLINIC | Facility: CLINIC | Age: 71
End: 2021-10-29

## 2021-10-29 NOTE — TELEPHONE ENCOUNTER
Placed [facility] Patient Assistance Program form in Dr Angelita Yao in-box for completion.

## 2021-10-29 NOTE — TELEPHONE ENCOUNTER
Pt dropped off paperwork for Dr Roverto Velez to complete.      Placed in Dr Roverto Velez fax folder

## 2021-11-01 ENCOUNTER — PATIENT MESSAGE (OUTPATIENT)
Dept: INTERNAL MEDICINE CLINIC | Facility: CLINIC | Age: 71
End: 2021-11-01

## 2021-11-01 NOTE — TELEPHONE ENCOUNTER
I have signed form. Please print off and attach patient's medication and allergy list and fax. Paperwork is in my outbasket.

## 2021-11-02 NOTE — TELEPHONE ENCOUNTER
Faxed completed BI ppw  At [phone]. Received confirmation. Copy sent to scan. Original form placed in blue accordion folder to hold. Patient notified via [place]. com. cnt.

## 2021-11-11 ENCOUNTER — PATIENT MESSAGE (OUTPATIENT)
Dept: INTERNAL MEDICINE CLINIC | Facility: CLINIC | Age: 71
End: 2021-11-11

## 2021-11-11 NOTE — TELEPHONE ENCOUNTER
From: Scarlett Perez  To: Treasure Matthew MD  Sent: 11/11/2021 2:29 PM CST  Subject: BI Patient Assistant Form    Hi, I recently received a letter from [facility] about the Pt.  Assistance form your office needed to partially fill out and fax back to them for my [place]

## 2021-11-11 NOTE — TELEPHONE ENCOUNTER
FORM completed and re faxed new copy placed in teal accoridon file pod 2 and also into scanning. New copy placed at  for pt to .     Pt made aware via [employer]

## 2021-12-02 ENCOUNTER — TELEPHONE (OUTPATIENT)
Dept: INTERNAL MEDICINE CLINIC | Facility: CLINIC | Age: 71
End: 2021-12-02

## 2021-12-02 DIAGNOSIS — Z12.31 ENCOUNTER FOR SCREENING MAMMOGRAM FOR MALIGNANT NEOPLASM OF BREAST: Primary | ICD-10-CM

## 2021-12-02 NOTE — TELEPHONE ENCOUNTER
Saurabh Andino with central scheduling requesting mammogram order on behalf of pt. Pt is aware that she is not due until February but wants to schedule now.      Saurabh Andino - [phone]

## 2021-12-10 ENCOUNTER — TELEPHONE (OUTPATIENT)
Dept: INTERNAL MEDICINE CLINIC | Facility: CLINIC | Age: 71
End: 2021-12-10

## 2022-01-02 DIAGNOSIS — I10 ESSENTIAL HYPERTENSION: ICD-10-CM

## 2022-01-02 DIAGNOSIS — F33.1 MDD (MAJOR DEPRESSIVE DISORDER), RECURRENT EPISODE, MODERATE (HCC): ICD-10-CM

## 2022-01-02 DIAGNOSIS — E78.2 MIXED HYPERLIPIDEMIA: ICD-10-CM

## 2022-01-04 RX ORDER — METOPROLOL TARTRATE 50 MG/1
50 TABLET, FILM COATED ORAL 2 TIMES DAILY
Qty: 180 TABLET | Refills: 0 | Status: SHIPPED | OUTPATIENT
Start: 2022-01-04 | End: 2022-01-21

## 2022-01-06 ENCOUNTER — HOSPITAL ENCOUNTER (OUTPATIENT)
Dept: ULTRASOUND IMAGING | Age: 72
Discharge: HOME | End: 2022-01-06
Attending: INTERNAL MEDICINE
Payer: MEDICARE

## 2022-01-06 ENCOUNTER — LAB ENCOUNTER (OUTPATIENT)
Dept: LAB | Age: 72
End: 2022-01-06
Attending: INTERNAL MEDICINE
Payer: MEDICARE

## 2022-01-06 ENCOUNTER — APPOINTMENT (OUTPATIENT)
Dept: LAB | Age: 72
End: 2022-01-06
Attending: INTERNAL MEDICINE
Payer: MEDICARE

## 2022-01-06 DIAGNOSIS — R79.89 ELEVATED LIVER FUNCTION TESTS: ICD-10-CM

## 2022-01-06 DIAGNOSIS — E11.59 HYPERTENSION ASSOCIATED WITH DIABETES (HCC): ICD-10-CM

## 2022-01-06 DIAGNOSIS — E78.5 HYPERLIPIDEMIA ASSOCIATED WITH TYPE 2 DIABETES MELLITUS (HCC): ICD-10-CM

## 2022-01-06 DIAGNOSIS — I15.2 HYPERTENSION ASSOCIATED WITH DIABETES (HCC): ICD-10-CM

## 2022-01-06 DIAGNOSIS — E11.69 HYPERLIPIDEMIA ASSOCIATED WITH TYPE 2 DIABETES MELLITUS (HCC): ICD-10-CM

## 2022-01-06 DIAGNOSIS — E11.9 TYPE 2 DIABETES MELLITUS WITHOUT COMPLICATION, WITHOUT LONG-TERM CURRENT USE OF INSULIN (HCC): ICD-10-CM

## 2022-01-06 LAB
ALBUMIN SERPL-MCNC: 3.7 G/DL (ref 3.4–5)
ALP LIVER SERPL-CCNC: 188 U/L
ALT SERPL-CCNC: 49 U/L
ANION GAP SERPL CALC-SCNC: 7 MMOL/L (ref 0–18)
AST SERPL-CCNC: 33 U/L (ref 15–37)
BILIRUB DIRECT SERPL-MCNC: 0.1 MG/DL (ref 0–0.2)
BILIRUB SERPL-MCNC: 0.5 MG/DL (ref 0.1–2)
BUN BLD-MCNC: 18 MG/DL (ref 7–18)
CALCIUM BLD-MCNC: 8.9 MG/DL (ref 8.5–10.1)
CHLORIDE SERPL-SCNC: 109 MMOL/L (ref 98–112)
CHOLEST SERPL-MCNC: 149 MG/DL (ref ?–200)
CO2 SERPL-SCNC: 27 MMOL/L (ref 21–32)
CREAT BLD-MCNC: 1.11 MG/DL
CREAT UR-SCNC: 98.1 MG/DL
EST. AVERAGE GLUCOSE BLD GHB EST-MCNC: 143 MG/DL (ref 68–126)
FASTING PATIENT LIPID ANSWER: YES
FASTING STATUS PATIENT QL REPORTED: YES
GLUCOSE BLD-MCNC: 140 MG/DL (ref 70–99)
HAV AB SER QL IA: NONREACTIVE
HBA1C MFR BLD: 6.6 % (ref ?–5.7)
HBV SURFACE AB SER QL: NONREACTIVE
HBV SURFACE AB SERPL IA-ACNC: 3.4 MIU/ML
HDLC SERPL-MCNC: 47 MG/DL (ref 40–59)
LDLC SERPL CALC-MCNC: 72 MG/DL (ref ?–100)
MICROALBUMIN UR-MCNC: 3.31 MG/DL
MICROALBUMIN/CREAT 24H UR-RTO: 33.7 UG/MG (ref ?–30)
NONHDLC SERPL-MCNC: 102 MG/DL (ref ?–130)
OSMOLALITY SERPL CALC.SUM OF ELEC: 300 MOSM/KG (ref 275–295)
POTASSIUM SERPL-SCNC: 3.9 MMOL/L (ref 3.5–5.1)
PROT SERPL-MCNC: 6.8 G/DL (ref 6.4–8.2)
SODIUM SERPL-SCNC: 143 MMOL/L (ref 136–145)
TRIGL SERPL-MCNC: 179 MG/DL (ref 30–149)
VLDLC SERPL CALC-MCNC: 27 MG/DL (ref 0–30)

## 2022-01-06 PROCEDURE — 76981 USE PARENCHYMA: CPT | Performed by: INTERNAL MEDICINE

## 2022-01-06 PROCEDURE — 82570 ASSAY OF URINE CREATININE: CPT

## 2022-01-06 PROCEDURE — 80048 BASIC METABOLIC PNL TOTAL CA: CPT

## 2022-01-06 PROCEDURE — 86706 HEP B SURFACE ANTIBODY: CPT

## 2022-01-06 PROCEDURE — 83036 HEMOGLOBIN GLYCOSYLATED A1C: CPT

## 2022-01-06 PROCEDURE — 82043 UR ALBUMIN QUANTITATIVE: CPT

## 2022-01-06 PROCEDURE — 80061 LIPID PANEL: CPT

## 2022-01-06 PROCEDURE — 36415 COLL VENOUS BLD VENIPUNCTURE: CPT

## 2022-01-06 PROCEDURE — 76705 ECHO EXAM OF ABDOMEN: CPT | Performed by: INTERNAL MEDICINE

## 2022-01-06 PROCEDURE — 80076 HEPATIC FUNCTION PANEL: CPT

## 2022-01-06 PROCEDURE — 86708 HEPATITIS A ANTIBODY: CPT

## 2022-01-09 DIAGNOSIS — R79.89 ELEVATED LIVER FUNCTION TESTS: Primary | ICD-10-CM

## 2022-01-10 NOTE — PROGRESS NOTES
Dear Jessica Sat,    Your ultrasound elastography again identifies normal to mild fibrosis - which is good. This indicates that there has been no progression in liver fibrosis. Your liver labs are stable with your alkaline phosphatase elevated.   The labs also

## 2022-01-13 ENCOUNTER — HOSPITAL ENCOUNTER (OUTPATIENT)
Dept: MRI IMAGING | Age: 72
Discharge: HOME | End: 2022-01-13
Attending: OPHTHALMOLOGY
Payer: MEDICARE

## 2022-01-13 DIAGNOSIS — H05.402: ICD-10-CM

## 2022-01-13 PROCEDURE — 70543 MRI ORBT/FAC/NCK W/O &W/DYE: CPT | Performed by: OPHTHALMOLOGY

## 2022-01-13 PROCEDURE — 70553 MRI BRAIN STEM W/O & W/DYE: CPT | Performed by: OPHTHALMOLOGY

## 2022-01-13 PROCEDURE — A9575 INJ GADOTERATE MEGLUMI 0.1ML: HCPCS

## 2022-01-21 ENCOUNTER — OFFICE VISIT (OUTPATIENT)
Dept: INTERNAL MEDICINE CLINIC | Facility: CLINIC | Age: 72
End: 2022-01-21
Payer: MEDICARE

## 2022-01-21 ENCOUNTER — LAB ENCOUNTER (OUTPATIENT)
Dept: LAB | Age: 72
End: 2022-01-21
Attending: INTERNAL MEDICINE
Payer: MEDICARE

## 2022-01-21 ENCOUNTER — TELEPHONE (OUTPATIENT)
Dept: SURGERY | Facility: CLINIC | Age: 72
End: 2022-01-21

## 2022-01-21 VITALS
BODY MASS INDEX: 28.95 KG/M2 | TEMPERATURE: 98 F | WEIGHT: 161.38 LBS | SYSTOLIC BLOOD PRESSURE: 120 MMHG | HEIGHT: 62.79 IN | OXYGEN SATURATION: 99 % | HEART RATE: 59 BPM | RESPIRATION RATE: 16 BRPM | DIASTOLIC BLOOD PRESSURE: 60 MMHG

## 2022-01-21 DIAGNOSIS — R90.0 INTRACRANIAL MASS: ICD-10-CM

## 2022-01-21 DIAGNOSIS — E11.69 HYPERLIPIDEMIA ASSOCIATED WITH TYPE 2 DIABETES MELLITUS (HCC): ICD-10-CM

## 2022-01-21 DIAGNOSIS — E66.3 OVERWEIGHT (BMI 25.0-29.9): ICD-10-CM

## 2022-01-21 DIAGNOSIS — Z00.00 ROUTINE GENERAL MEDICAL EXAMINATION AT A HEALTH CARE FACILITY: Primary | ICD-10-CM

## 2022-01-21 DIAGNOSIS — E78.2 MIXED HYPERLIPIDEMIA: ICD-10-CM

## 2022-01-21 DIAGNOSIS — M79.10 MYALGIA: ICD-10-CM

## 2022-01-21 DIAGNOSIS — E11.9 TYPE 2 DIABETES MELLITUS WITHOUT COMPLICATION, WITHOUT LONG-TERM CURRENT USE OF INSULIN (HCC): ICD-10-CM

## 2022-01-21 DIAGNOSIS — K76.0 FATTY LIVER DETERMINED BY BIOPSY: ICD-10-CM

## 2022-01-21 DIAGNOSIS — E78.5 HYPERLIPIDEMIA ASSOCIATED WITH TYPE 2 DIABETES MELLITUS (HCC): ICD-10-CM

## 2022-01-21 DIAGNOSIS — Z00.00 ROUTINE GENERAL MEDICAL EXAMINATION AT A HEALTH CARE FACILITY: ICD-10-CM

## 2022-01-21 DIAGNOSIS — I10 ESSENTIAL HYPERTENSION: ICD-10-CM

## 2022-01-21 DIAGNOSIS — F33.1 MDD (MAJOR DEPRESSIVE DISORDER), RECURRENT EPISODE, MODERATE (HCC): ICD-10-CM

## 2022-01-21 DIAGNOSIS — E11.59 HYPERTENSION ASSOCIATED WITH DIABETES (HCC): ICD-10-CM

## 2022-01-21 DIAGNOSIS — I15.2 HYPERTENSION ASSOCIATED WITH DIABETES (HCC): ICD-10-CM

## 2022-01-21 LAB
ANION GAP SERPL CALC-SCNC: 4 MMOL/L (ref 0–18)
BUN BLD-MCNC: 19 MG/DL (ref 7–18)
CALCIUM BLD-MCNC: 8.7 MG/DL (ref 8.5–10.1)
CHLORIDE SERPL-SCNC: 108 MMOL/L (ref 98–112)
CK SERPL-CCNC: 66 U/L
CO2 SERPL-SCNC: 28 MMOL/L (ref 21–32)
CREAT BLD-MCNC: 1.11 MG/DL
FASTING STATUS PATIENT QL REPORTED: NO
GLUCOSE BLD-MCNC: 101 MG/DL (ref 70–99)
OSMOLALITY SERPL CALC.SUM OF ELEC: 292 MOSM/KG (ref 275–295)
POTASSIUM SERPL-SCNC: 4.4 MMOL/L (ref 3.5–5.1)
SODIUM SERPL-SCNC: 140 MMOL/L (ref 136–145)

## 2022-01-21 PROCEDURE — G0444 DEPRESSION SCREEN ANNUAL: HCPCS | Performed by: INTERNAL MEDICINE

## 2022-01-21 PROCEDURE — 82550 ASSAY OF CK (CPK): CPT

## 2022-01-21 PROCEDURE — G0439 PPPS, SUBSEQ VISIT: HCPCS | Performed by: INTERNAL MEDICINE

## 2022-01-21 PROCEDURE — 80048 BASIC METABOLIC PNL TOTAL CA: CPT

## 2022-01-21 PROCEDURE — 36415 COLL VENOUS BLD VENIPUNCTURE: CPT

## 2022-01-21 PROCEDURE — 82085 ASSAY OF ALDOLASE: CPT

## 2022-01-21 PROCEDURE — 99214 OFFICE O/P EST MOD 30 MIN: CPT | Performed by: INTERNAL MEDICINE

## 2022-01-21 RX ORDER — METOPROLOL TARTRATE 50 MG/1
50 TABLET, FILM COATED ORAL 2 TIMES DAILY
Qty: 180 TABLET | Refills: 1 | Status: SHIPPED | OUTPATIENT
Start: 2022-01-21

## 2022-01-21 RX ORDER — ATORVASTATIN CALCIUM 20 MG/1
20 TABLET, FILM COATED ORAL DAILY
Qty: 90 TABLET | Refills: 2 | Status: SHIPPED | OUTPATIENT
Start: 2022-01-21

## 2022-01-21 NOTE — TELEPHONE ENCOUNTER
It looks like an incidental finding based on questions about her eye. The 4 mm right cerebellar enhancing area. It is nonspecific. You can asked Dr. Khushbu Johnson if he wants to move her up and review it.   But it is probably fine to wait 3 to 4 weeks to see h

## 2022-01-21 NOTE — PATIENT INSTRUCTIONS
Please complete your current supply of jardiance. Once you run out, please email me your blood sugars every 14 days. We will then decide next treatment steps.

## 2022-01-21 NOTE — TELEPHONE ENCOUNTER
pt scheduled for 2/22/2022, this was first avail is there another time where pt can be scheduled sooner if needed?

## 2022-01-21 NOTE — PROGRESS NOTES
Fidencio Owens is a 73year old female. HPI:   Patient presents for medicare wellness exam and additional issues noted below. 1. Abnormal brain MRI.  It was done because she presented for eye exam and she noticed her right eye was protruding more th congestion  CARDIOVASCULAR: denies chest pain, pressure or palpitations  GI: denies abdominal pain,n/v, BRBPR, melena. Chronic diarrhea is stable.    : no dysuria or hematuria or vaginal bleeding   SKIN: denies any unusual skin lesions or rashes - follows RIGHT      2007 APROX   • ERCP,DIAGNOSTIC     • HYSTERECTOMY  1998   • LYSIS OF ADHESIONS     • NEEDLE BIOPSY LIVER     • OOPHORECTOMY      1998   • OTHER SURGICAL HISTORY  2008, 2004    arthroscopy of left knee   • OTHER SURGICAL HISTORY      left ankle f for diagnosis. Has been off ursodiol since 11/2019 2/2 high cost.  # Type 2 Diabetes:  A1C controlled in 1/2022. She can no longer afford jardiance and wants to try to get off some medications.  She is going to complete current supply of jardiance and will weight bearing exercises. Educated on fall prevention. Vaccines - up to date with prevnar 15 and pneumovac 23 and shingrix. TDAP 2018.   Cont yearly flu shot.    Hep C Screening - ab negative in 2018.    Healthcare Living Will and Medical POA: resources p

## 2022-01-24 LAB — ALDOLASE, SERUM: 8.1 U/L

## 2022-01-24 NOTE — TELEPHONE ENCOUNTER
Messaged pt via [facility] to inform that as advised by Mary Grace Castillo to be seen on 2/22/22 and information has been forwarded onto provider and if he feels pt should be seen sooner we will reach out to pt to inform.

## 2022-02-03 ENCOUNTER — HOSPITAL ENCOUNTER (OUTPATIENT)
Dept: MAMMOGRAPHY | Age: 72
Discharge: HOME | End: 2022-02-03
Attending: INTERNAL MEDICINE
Payer: MEDICARE

## 2022-02-03 DIAGNOSIS — Z12.31 ENCOUNTER FOR SCREENING MAMMOGRAM FOR MALIGNANT NEOPLASM OF BREAST: ICD-10-CM

## 2022-02-03 PROCEDURE — 77067 SCR MAMMO BI INCL CAD: CPT | Performed by: INTERNAL MEDICINE

## 2022-02-03 PROCEDURE — 77063 BREAST TOMOSYNTHESIS BI: CPT | Performed by: INTERNAL MEDICINE

## 2022-02-07 ENCOUNTER — HOSPITAL ENCOUNTER (OUTPATIENT)
Dept: ULTRASOUND IMAGING | Age: 72
Discharge: HOME | End: 2022-02-07
Attending: INTERNAL MEDICINE
Payer: MEDICARE

## 2022-02-07 ENCOUNTER — HOSPITAL ENCOUNTER (OUTPATIENT)
Dept: MAMMOGRAPHY | Age: 72
Discharge: HOME | End: 2022-02-07
Attending: INTERNAL MEDICINE
Payer: MEDICARE

## 2022-02-07 DIAGNOSIS — R92.8 ABNORMALITY OF RIGHT BREAST ON SCREENING MAMMOGRAM: ICD-10-CM

## 2022-02-07 PROCEDURE — 76642 ULTRASOUND BREAST LIMITED: CPT | Performed by: INTERNAL MEDICINE

## 2022-02-22 ENCOUNTER — APPOINTMENT (OUTPATIENT)
Dept: HEMATOLOGY/ONCOLOGY | Facility: HOSPITAL | Age: 72
End: 2022-02-22
Payer: MEDICARE

## 2022-03-01 ENCOUNTER — NURSE ONLY (OUTPATIENT)
Dept: HEMATOLOGY/ONCOLOGY | Facility: HOSPITAL | Age: 72
End: 2022-03-01
Attending: NEUROLOGICAL SURGERY
Payer: MEDICARE

## 2022-03-01 ENCOUNTER — TELEPHONE (OUTPATIENT)
Dept: SURGERY | Facility: CLINIC | Age: 72
End: 2022-03-01

## 2022-03-01 ENCOUNTER — OFFICE VISIT (OUTPATIENT)
Dept: NEUROLOGY | Facility: CLINIC | Age: 72
End: 2022-03-01
Payer: MEDICARE

## 2022-03-01 VITALS
WEIGHT: 161 LBS | TEMPERATURE: 98 F | DIASTOLIC BLOOD PRESSURE: 82 MMHG | RESPIRATION RATE: 18 BRPM | SYSTOLIC BLOOD PRESSURE: 147 MMHG | OXYGEN SATURATION: 97 % | BODY MASS INDEX: 29 KG/M2 | HEART RATE: 73 BPM

## 2022-03-01 DIAGNOSIS — G93.9 CEREBELLAR LESION: Primary | ICD-10-CM

## 2022-03-01 PROCEDURE — 99204 OFFICE O/P NEW MOD 45 MIN: CPT | Performed by: PHYSICIAN ASSISTANT

## 2022-03-01 PROCEDURE — 99211 OFF/OP EST MAY X REQ PHY/QHP: CPT

## 2022-03-01 RX ORDER — PREDNISONE, DIPHENHYDRAMINE
1 KIT SEE ADMIN INSTRUCTIONS
Qty: 1 KIT | Refills: 0 | Status: SHIPPED | OUTPATIENT
Start: 2022-03-01

## 2022-03-01 NOTE — TELEPHONE ENCOUNTER
Pharmacy was called, prednisone and benadryl scripts need to be  as contrast allergy pack is not available.

## 2022-03-07 ENCOUNTER — OFFICE VISIT (OUTPATIENT)
Dept: SURGERY | Facility: CLINIC | Age: 72
End: 2022-03-07
Payer: MEDICARE

## 2022-03-07 VITALS
HEART RATE: 67 BPM | BODY MASS INDEX: 28 KG/M2 | WEIGHT: 158.63 LBS | RESPIRATION RATE: 16 BRPM | SYSTOLIC BLOOD PRESSURE: 142 MMHG | TEMPERATURE: 96 F | DIASTOLIC BLOOD PRESSURE: 85 MMHG | OXYGEN SATURATION: 95 %

## 2022-04-11 ENCOUNTER — PATIENT MESSAGE (OUTPATIENT)
Dept: INTERNAL MEDICINE CLINIC | Facility: CLINIC | Age: 72
End: 2022-04-11

## 2022-04-13 RX ORDER — EMPAGLIFLOZIN 25 MG/1
25 TABLET, FILM COATED ORAL DAILY
Qty: 90 TABLET | Refills: 1 | Status: SHIPPED | OUTPATIENT
Start: 2022-04-13 | End: 2022-04-16

## 2022-04-16 ENCOUNTER — PATIENT MESSAGE (OUTPATIENT)
Dept: INTERNAL MEDICINE CLINIC | Facility: CLINIC | Age: 72
End: 2022-04-16

## 2022-04-16 NOTE — TELEPHONE ENCOUNTER
Please send in rx to mail order per patients request.   Also last rx sent to [facility] on 4/13 has 25 oz on sig, changed to 25 mg (please review).    Ty.

## 2022-04-16 NOTE — TELEPHONE ENCOUNTER
Pt requesting to change pharmacy to [address] [place] [address].      [facility] [address], [address] to [facility], [phone], [phone]

## 2022-04-18 RX ORDER — EMPAGLIFLOZIN 25 MG/1
25 TABLET, FILM COATED ORAL DAILY
Qty: 90 TABLET | Refills: 1 | Status: SHIPPED | OUTPATIENT
Start: 2022-04-18

## 2022-05-24 ENCOUNTER — PATIENT MESSAGE (OUTPATIENT)
Dept: INTERNAL MEDICINE CLINIC | Facility: CLINIC | Age: 72
End: 2022-05-24

## 2022-05-24 NOTE — TELEPHONE ENCOUNTER
From: Zoey Andrade  To: Katie Quinonez MD  Sent: 5/24/2022 11:41 AM CDT  Subject: Change e check- in information. I went to a wedding in [place] this past weekend (5/21 - 5/23). Now have sore throat, slight cough and some congestion. I will do a quick Covid test later this afternoon. If it is positive I will let the office know and either I can reschedule or do an e-visit. Please add to my record that I did have my 2nd booster on 4/7/22 .     Thanks, Raheem Jones

## 2022-05-25 ENCOUNTER — TELEMEDICINE (OUTPATIENT)
Dept: INTERNAL MEDICINE CLINIC | Facility: CLINIC | Age: 72
End: 2022-05-25

## 2022-05-25 DIAGNOSIS — E78.5 HYPERLIPIDEMIA ASSOCIATED WITH TYPE 2 DIABETES MELLITUS (HCC): ICD-10-CM

## 2022-05-25 DIAGNOSIS — K76.0 FATTY LIVER DETERMINED BY BIOPSY: Primary | ICD-10-CM

## 2022-05-25 DIAGNOSIS — E11.59 HYPERTENSION ASSOCIATED WITH DIABETES (HCC): ICD-10-CM

## 2022-05-25 DIAGNOSIS — I15.2 HYPERTENSION ASSOCIATED WITH DIABETES (HCC): ICD-10-CM

## 2022-05-25 DIAGNOSIS — F33.1 MDD (MAJOR DEPRESSIVE DISORDER), RECURRENT EPISODE, MODERATE (HCC): ICD-10-CM

## 2022-05-25 DIAGNOSIS — U07.1 COVID-19 VIRUS INFECTION: ICD-10-CM

## 2022-05-25 DIAGNOSIS — E11.9 TYPE 2 DIABETES MELLITUS WITHOUT COMPLICATION, WITHOUT LONG-TERM CURRENT USE OF INSULIN (HCC): ICD-10-CM

## 2022-05-25 DIAGNOSIS — E11.69 HYPERLIPIDEMIA ASSOCIATED WITH TYPE 2 DIABETES MELLITUS (HCC): ICD-10-CM

## 2022-05-25 PROCEDURE — 99214 OFFICE O/P EST MOD 30 MIN: CPT | Performed by: INTERNAL MEDICINE

## 2022-05-25 RX ORDER — BUPROPION HYDROCHLORIDE 300 MG/1
300 TABLET ORAL EVERY MORNING
Qty: 90 TABLET | Refills: 1 | Status: SHIPPED | OUTPATIENT
Start: 2022-05-25 | End: 2022-05-25

## 2022-05-25 RX ORDER — BUPROPION HYDROCHLORIDE 300 MG/1
300 TABLET ORAL EVERY MORNING
Qty: 90 TABLET | Refills: 1 | Status: SHIPPED | OUTPATIENT
Start: 2022-05-25

## 2022-05-25 RX ORDER — FLUOXETINE HYDROCHLORIDE 20 MG/1
20 CAPSULE ORAL DAILY
Qty: 90 CAPSULE | Refills: 1 | Status: SHIPPED | OUTPATIENT
Start: 2022-05-25

## 2022-05-25 NOTE — PATIENT INSTRUCTIONS
Please get your twinrix series from the pharmacy. Please complete your labs in July, 2022.  You do not need to fast.

## 2022-05-31 ENCOUNTER — TELEPHONE (OUTPATIENT)
Dept: CT IMAGING | Facility: HOSPITAL | Age: 72
End: 2022-05-31

## 2022-05-31 NOTE — TELEPHONE ENCOUNTER
I spoke with patient and reviewed prep with pt. for Dotarem contrast allergy. First time pretx since having Dotarem in Jan with symptoms of shaking, chills, vomitting. Aware of need for  and verbalized understanding of meds/times/doses and pt thanked me for the phone call.

## 2022-06-02 DIAGNOSIS — E11.9 TYPE 2 DIABETES MELLITUS WITHOUT COMPLICATION, WITHOUT LONG-TERM CURRENT USE OF INSULIN (HCC): ICD-10-CM

## 2022-06-02 RX ORDER — BLOOD SUGAR DIAGNOSTIC
STRIP MISCELLANEOUS
Qty: 100 STRIP | Refills: 11 | Status: SHIPPED | OUTPATIENT
Start: 2022-06-02

## 2022-06-07 ENCOUNTER — APPOINTMENT (OUTPATIENT)
Dept: HEMATOLOGY/ONCOLOGY | Facility: HOSPITAL | Age: 72
End: 2022-06-07
Attending: NEUROLOGICAL SURGERY
Payer: MEDICARE

## 2022-06-07 ENCOUNTER — HOSPITAL ENCOUNTER (OUTPATIENT)
Dept: MRI IMAGING | Age: 72
Discharge: HOME | End: 2022-06-07
Attending: PHYSICIAN ASSISTANT
Payer: MEDICARE

## 2022-06-07 DIAGNOSIS — G93.9 CEREBELLAR LESION: ICD-10-CM

## 2022-06-07 PROCEDURE — 70553 MRI BRAIN STEM W/O & W/DYE: CPT | Performed by: PHYSICIAN ASSISTANT

## 2022-06-07 PROCEDURE — A9575 INJ GADOTERATE MEGLUMI 0.1ML: HCPCS | Performed by: PHYSICIAN ASSISTANT

## 2022-06-14 ENCOUNTER — APPOINTMENT (OUTPATIENT)
Dept: HEMATOLOGY/ONCOLOGY | Facility: HOSPITAL | Age: 72
End: 2022-06-14
Attending: NEUROLOGICAL SURGERY
Payer: MEDICARE

## 2022-06-21 ENCOUNTER — TELEPHONE (OUTPATIENT)
Dept: INTERNAL MEDICINE CLINIC | Facility: CLINIC | Age: 72
End: 2022-06-21

## 2022-06-21 NOTE — TELEPHONE ENCOUNTER
Incoming diabetic eye exam report via fax from [address]. Flowsheet has been updated report placed in [place] in basket.

## 2022-06-28 ENCOUNTER — NURSE ONLY (OUTPATIENT)
Dept: HEMATOLOGY/ONCOLOGY | Facility: HOSPITAL | Age: 72
End: 2022-06-28
Attending: NEUROLOGICAL SURGERY
Payer: MEDICARE

## 2022-06-28 ENCOUNTER — OFFICE VISIT (OUTPATIENT)
Dept: NEUROLOGY | Facility: CLINIC | Age: 72
End: 2022-06-28
Payer: MEDICARE

## 2022-06-28 VITALS
DIASTOLIC BLOOD PRESSURE: 86 MMHG | RESPIRATION RATE: 16 BRPM | SYSTOLIC BLOOD PRESSURE: 136 MMHG | HEART RATE: 61 BPM | TEMPERATURE: 98 F

## 2022-06-28 DIAGNOSIS — H93.13 TINNITUS OF BOTH EARS: ICD-10-CM

## 2022-06-28 DIAGNOSIS — G93.9 CEREBELLAR LESION: Primary | ICD-10-CM

## 2022-06-28 DIAGNOSIS — R26.81 GAIT INSTABILITY: ICD-10-CM

## 2022-06-28 PROCEDURE — 99211 OFF/OP EST MAY X REQ PHY/QHP: CPT

## 2022-06-28 PROCEDURE — 99212 OFFICE O/P EST SF 10 MIN: CPT | Performed by: NEUROLOGICAL SURGERY

## 2022-07-13 ENCOUNTER — LAB ENCOUNTER (OUTPATIENT)
Dept: LAB | Age: 72
End: 2022-07-13
Attending: INTERNAL MEDICINE
Payer: MEDICARE

## 2022-07-13 DIAGNOSIS — E78.5 HYPERLIPIDEMIA ASSOCIATED WITH TYPE 2 DIABETES MELLITUS (HCC): ICD-10-CM

## 2022-07-13 DIAGNOSIS — I15.2 HYPERTENSION ASSOCIATED WITH DIABETES (HCC): ICD-10-CM

## 2022-07-13 DIAGNOSIS — R74.8 ELEVATED ALKALINE PHOSPHATASE LEVEL: ICD-10-CM

## 2022-07-13 DIAGNOSIS — E11.69 HYPERLIPIDEMIA ASSOCIATED WITH TYPE 2 DIABETES MELLITUS (HCC): ICD-10-CM

## 2022-07-13 DIAGNOSIS — K76.0 FATTY LIVER DETERMINED BY BIOPSY: ICD-10-CM

## 2022-07-13 DIAGNOSIS — E11.59 HYPERTENSION ASSOCIATED WITH DIABETES (HCC): ICD-10-CM

## 2022-07-13 LAB
ALBUMIN SERPL-MCNC: 3.5 G/DL (ref 3.4–5)
ALP LIVER SERPL-CCNC: 200 U/L
ALT SERPL-CCNC: 47 U/L
ANION GAP SERPL CALC-SCNC: 7 MMOL/L (ref 0–18)
AST SERPL-CCNC: 32 U/L (ref 15–37)
BILIRUB DIRECT SERPL-MCNC: 0.2 MG/DL (ref 0–0.2)
BILIRUB SERPL-MCNC: 0.7 MG/DL (ref 0.1–2)
BUN BLD-MCNC: 20 MG/DL (ref 7–18)
CALCIUM BLD-MCNC: 9.7 MG/DL (ref 8.5–10.1)
CHLORIDE SERPL-SCNC: 108 MMOL/L (ref 98–112)
CO2 SERPL-SCNC: 26 MMOL/L (ref 21–32)
CREAT BLD-MCNC: 1.29 MG/DL
EST. AVERAGE GLUCOSE BLD GHB EST-MCNC: 146 MG/DL (ref 68–126)
FASTING STATUS PATIENT QL REPORTED: YES
GLUCOSE BLD-MCNC: 120 MG/DL (ref 70–99)
HBA1C MFR BLD: 6.7 % (ref ?–5.7)
OSMOLALITY SERPL CALC.SUM OF ELEC: 296 MOSM/KG (ref 275–295)
POTASSIUM SERPL-SCNC: 4.4 MMOL/L (ref 3.5–5.1)
PROT SERPL-MCNC: 7.5 G/DL (ref 6.4–8.2)
SODIUM SERPL-SCNC: 141 MMOL/L (ref 136–145)

## 2022-07-13 PROCEDURE — 80076 HEPATIC FUNCTION PANEL: CPT

## 2022-07-13 PROCEDURE — 84080 ASSAY ALKALINE PHOSPHATASES: CPT

## 2022-07-13 PROCEDURE — 83036 HEMOGLOBIN GLYCOSYLATED A1C: CPT

## 2022-07-13 PROCEDURE — 80048 BASIC METABOLIC PNL TOTAL CA: CPT

## 2022-07-13 PROCEDURE — 84075 ASSAY ALKALINE PHOSPHATASE: CPT

## 2022-07-13 PROCEDURE — 36415 COLL VENOUS BLD VENIPUNCTURE: CPT

## 2022-07-17 LAB
ALK-PHOSPHATASE BONE CALC: 40 U/L
ALK-PHOSPHATASE LIVER CALC: 158 U/L
ALK-PHOSPHATASE OTHER CALC: 0 U/L
ALKALINE PHOSPHATASE: 198 U/L

## 2022-09-16 DIAGNOSIS — I10 ESSENTIAL HYPERTENSION: ICD-10-CM

## 2022-09-16 DIAGNOSIS — E78.2 MIXED HYPERLIPIDEMIA: ICD-10-CM

## 2022-09-16 DIAGNOSIS — F33.1 MDD (MAJOR DEPRESSIVE DISORDER), RECURRENT EPISODE, MODERATE (HCC): ICD-10-CM

## 2022-09-16 RX ORDER — METOPROLOL TARTRATE 50 MG/1
TABLET, FILM COATED ORAL
Qty: 180 TABLET | Refills: 1 | Status: SHIPPED | OUTPATIENT
Start: 2022-09-16

## 2022-09-26 ENCOUNTER — TELEPHONE (OUTPATIENT)
Dept: INTERNAL MEDICINE CLINIC | Facility: CLINIC | Age: 72
End: 2022-09-26

## 2022-09-26 NOTE — TELEPHONE ENCOUNTER
Pt dropped off patient assistance forms to be completed by Dr. Julio Spain. Once forms are completed please fax. Fax number has been provided by pt inside envelope. Form placed in Dr. Boyd Bryan fax folder.

## 2022-09-26 NOTE — TELEPHONE ENCOUNTER
Faxed completed forms back to [employer] at [phone]      Received confirmation. Copy sent to scan.  Original form placed in accordion folder in POD#2

## 2022-10-03 NOTE — TELEPHONE ENCOUNTER
Protocol failed     Requesting: jardiance 25mg     LOV: 1/21/22   RTC: 3 months   Filled: 4/18/22 #90 1 refill   Recent Labs: 7/13/22     Upcoming OV: none scheduled

## 2022-10-04 RX ORDER — EMPAGLIFLOZIN 25 MG/1
1 TABLET, FILM COATED ORAL DAILY
Qty: 90 TABLET | Refills: 3 | Status: SHIPPED | OUTPATIENT
Start: 2022-10-04

## 2022-10-19 ENCOUNTER — OFFICE VISIT (OUTPATIENT)
Dept: INTERNAL MEDICINE CLINIC | Facility: CLINIC | Age: 72
End: 2022-10-19
Payer: MEDICARE

## 2022-10-19 ENCOUNTER — TELEPHONE (OUTPATIENT)
Dept: INTERNAL MEDICINE CLINIC | Facility: CLINIC | Age: 72
End: 2022-10-19

## 2022-10-19 VITALS
SYSTOLIC BLOOD PRESSURE: 130 MMHG | OXYGEN SATURATION: 95 % | RESPIRATION RATE: 14 BRPM | WEIGHT: 156 LBS | TEMPERATURE: 98 F | HEIGHT: 63 IN | DIASTOLIC BLOOD PRESSURE: 72 MMHG | HEART RATE: 68 BPM | BODY MASS INDEX: 27.64 KG/M2

## 2022-10-19 DIAGNOSIS — E11.59 HYPERTENSION ASSOCIATED WITH DIABETES (HCC): ICD-10-CM

## 2022-10-19 DIAGNOSIS — E11.9 TYPE 2 DIABETES MELLITUS WITHOUT COMPLICATION, WITHOUT LONG-TERM CURRENT USE OF INSULIN (HCC): ICD-10-CM

## 2022-10-19 DIAGNOSIS — H65.193 OTHER NON-RECURRENT ACUTE NONSUPPURATIVE OTITIS MEDIA OF BOTH EARS: Primary | ICD-10-CM

## 2022-10-19 DIAGNOSIS — E78.5 HYPERLIPIDEMIA ASSOCIATED WITH TYPE 2 DIABETES MELLITUS (HCC): ICD-10-CM

## 2022-10-19 DIAGNOSIS — E11.69 HYPERLIPIDEMIA ASSOCIATED WITH TYPE 2 DIABETES MELLITUS (HCC): ICD-10-CM

## 2022-10-19 DIAGNOSIS — R80.9 TYPE 2 DIABETES MELLITUS WITH ALBUMINURIA (HCC): ICD-10-CM

## 2022-10-19 DIAGNOSIS — E11.29 TYPE 2 DIABETES MELLITUS WITH ALBUMINURIA (HCC): ICD-10-CM

## 2022-10-19 DIAGNOSIS — I15.2 HYPERTENSION ASSOCIATED WITH DIABETES (HCC): ICD-10-CM

## 2022-10-19 PROCEDURE — 99214 OFFICE O/P EST MOD 30 MIN: CPT | Performed by: INTERNAL MEDICINE

## 2022-10-19 RX ORDER — ASCORBIC ACID 500 MG
TABLET ORAL
COMMUNITY

## 2022-10-19 RX ORDER — FLUTICASONE PROPIONATE 50 MCG
2 SPRAY, SUSPENSION (ML) NASAL DAILY
Qty: 16 G | Refills: 0 | Status: SHIPPED | OUTPATIENT
Start: 2022-10-19 | End: 2023-10-14

## 2022-10-19 RX ORDER — LOSARTAN POTASSIUM 25 MG/1
25 TABLET ORAL EVERY EVENING
Qty: 90 TABLET | Refills: 0 | Status: SHIPPED | OUTPATIENT
Start: 2022-10-19

## 2022-10-19 RX ORDER — AMOXICILLIN AND CLAVULANATE POTASSIUM 875; 125 MG/1; MG/1
1 TABLET, FILM COATED ORAL 2 TIMES DAILY
Qty: 14 TABLET | Refills: 0 | Status: SHIPPED | OUTPATIENT
Start: 2022-10-19

## 2022-10-19 NOTE — PATIENT INSTRUCTIONS
For your high blood pressure and kidney protection please stop the metoprolol and start losartan 25 mg every evening prior to bedtime. Please re-check your kidney function labs in 1-2 weeks after starting losartan. You do not need to fast.     You will be due for your routine labs and urine testing in January, 2023. You can use 400-800 mg of ibuprofen 3-4 times daily. You should not exceed 2400 mg in 24 hours. ALWAYS take with food. Do not continue this high dosing for more than 2-4 weeks as ibuprofen products can cause stomach ulcers, worsen heartburn, affect the kidneys and thin the blood. Please measure your blood pressure and pulse daily and record these values. Please measure your blood pressure two times in the morning and two times in the evening (1 minute apart) after when you have been relaxing for at least 5 minutes. Both feet should be flat on the ground and you should be seated. Please bring these values in at your next visit. Please call us if your blood pressure is greater than 130/80 on 3 occasions.

## 2022-10-19 NOTE — TELEPHONE ENCOUNTER
Spoke with patient to further triage. Patient reports the following:     -Symptom onset 10/14 (two days after exposure to grandson's dx with RSV). -symptoms started as sore throat and have progressed into \"sinus/ear pain and pressure\". -denies fever  -took home covid test 10/15 and 10/17, all reported by patient as \"negative\"     KS made aware of above. Instructed to have patient check COVID home test today and report results to office. Patient made aware, awaiting return call from patient.

## 2022-10-24 ENCOUNTER — PATIENT MESSAGE (OUTPATIENT)
Dept: INTERNAL MEDICINE CLINIC | Facility: CLINIC | Age: 72
End: 2022-10-24

## 2022-10-24 DIAGNOSIS — J02.9 SORE THROAT: Primary | ICD-10-CM

## 2022-10-25 RX ORDER — PREDNISONE 20 MG/1
20 TABLET ORAL DAILY
Qty: 5 TABLET | Refills: 0 | Status: SHIPPED | OUTPATIENT
Start: 2022-10-25 | End: 2022-10-30

## 2022-10-25 NOTE — TELEPHONE ENCOUNTER
From: Sarah Singleton  To: Layo Lincoln MD  Sent: 10/24/2022 1:35 PM CDT  Subject: Visit follow up     Had visit with you last Wednesday for sinus/ ear infections. You prescribed antibiotics. I have one day left to take them and still have most of my symptoms. Mostly my ears are still stuffy and I can hardly hear. Cough continues, mostly at night and my left ear still hurts. Should I come back in or just change my prescription?     Thanks,   Evans Parker Wheelchair/Stroller

## 2022-10-30 RX ORDER — DOXYCYCLINE HYCLATE 100 MG
100 TABLET ORAL 2 TIMES DAILY
Qty: 10 TABLET | Refills: 0 | Status: SHIPPED | OUTPATIENT
Start: 2022-10-30

## 2022-11-01 RX ORDER — FLUOXETINE HYDROCHLORIDE 20 MG/1
CAPSULE ORAL
Qty: 90 CAPSULE | Refills: 1 | Status: SHIPPED | OUTPATIENT
Start: 2022-11-01

## 2022-11-01 NOTE — TELEPHONE ENCOUNTER
LOV: 10/19/2022 with Dr. Kayla Serrano  RTC: 3 months  Last Relevant Labs: 7/13/2022  Filled: 5/25/2022    #90 with 1 refill    No future appointments.

## 2022-11-16 ENCOUNTER — OFFICE VISIT (OUTPATIENT)
Facility: LOCATION | Age: 72
End: 2022-11-16
Payer: MEDICARE

## 2022-11-16 DIAGNOSIS — H93.13 TINNITUS OF BOTH EARS: ICD-10-CM

## 2022-11-16 DIAGNOSIS — H93.293 ABNORMAL AUDITORY PERCEPTION OF BOTH EARS: Primary | ICD-10-CM

## 2022-11-16 DIAGNOSIS — J34.1 CYST OF MAXILLARY SINUS: ICD-10-CM

## 2022-11-16 DIAGNOSIS — H65.22 LEFT CHRONIC SEROUS OTITIS MEDIA: Primary | ICD-10-CM

## 2022-11-16 PROCEDURE — 92557 COMPREHENSIVE HEARING TEST: CPT | Performed by: AUDIOLOGIST

## 2022-11-16 PROCEDURE — 99204 OFFICE O/P NEW MOD 45 MIN: CPT | Performed by: OTOLARYNGOLOGY

## 2022-11-16 PROCEDURE — 92567 TYMPANOMETRY: CPT | Performed by: AUDIOLOGIST

## 2022-11-16 RX ORDER — PREDNISONE 1 MG/1
5 TABLET ORAL DAILY
Qty: 10 TABLET | Refills: 0 | Status: SHIPPED | OUTPATIENT
Start: 2022-11-16

## 2022-11-16 NOTE — PROGRESS NOTES
Lisa Osorio was seen for audiometric evaluation today. Referred back to physician.      Audree Cheadle, Au.D

## 2022-11-17 DIAGNOSIS — E78.2 MIXED HYPERLIPIDEMIA: ICD-10-CM

## 2022-11-17 DIAGNOSIS — F33.1 MDD (MAJOR DEPRESSIVE DISORDER), RECURRENT EPISODE, MODERATE (HCC): ICD-10-CM

## 2022-11-17 DIAGNOSIS — I10 ESSENTIAL HYPERTENSION: ICD-10-CM

## 2022-11-17 RX ORDER — ATORVASTATIN CALCIUM 20 MG/1
20 TABLET, FILM COATED ORAL DAILY
Qty: 90 TABLET | Refills: 3 | Status: SHIPPED | OUTPATIENT
Start: 2022-11-17

## 2022-11-17 NOTE — TELEPHONE ENCOUNTER
LOV: 10/19/2022 with Dr. Uriah Kimble  RTC: 3 months  Last Relevant Labs: 7/13/2022  Last lipid 1/6/2022  Filled: 1/21/2022    #90 with 2 refills    No future appointments.

## 2022-11-18 ENCOUNTER — LAB ENCOUNTER (OUTPATIENT)
Dept: LAB | Age: 72
End: 2022-11-18
Attending: INTERNAL MEDICINE
Payer: MEDICARE

## 2022-11-18 DIAGNOSIS — E11.69 HYPERLIPIDEMIA ASSOCIATED WITH TYPE 2 DIABETES MELLITUS (HCC): ICD-10-CM

## 2022-11-18 DIAGNOSIS — E11.9 TYPE 2 DIABETES MELLITUS WITHOUT COMPLICATION, WITHOUT LONG-TERM CURRENT USE OF INSULIN (HCC): ICD-10-CM

## 2022-11-18 DIAGNOSIS — I15.2 HYPERTENSION ASSOCIATED WITH DIABETES (HCC): ICD-10-CM

## 2022-11-18 DIAGNOSIS — R80.9 TYPE 2 DIABETES MELLITUS WITH ALBUMINURIA (HCC): ICD-10-CM

## 2022-11-18 DIAGNOSIS — E11.59 HYPERTENSION ASSOCIATED WITH DIABETES (HCC): ICD-10-CM

## 2022-11-18 DIAGNOSIS — E11.29 TYPE 2 DIABETES MELLITUS WITH ALBUMINURIA (HCC): ICD-10-CM

## 2022-11-18 DIAGNOSIS — E78.5 HYPERLIPIDEMIA ASSOCIATED WITH TYPE 2 DIABETES MELLITUS (HCC): ICD-10-CM

## 2022-11-18 LAB
ALBUMIN SERPL-MCNC: 3.7 G/DL (ref 3.4–5)
ALP LIVER SERPL-CCNC: 159 U/L
ALT SERPL-CCNC: 31 U/L
ANION GAP SERPL CALC-SCNC: 3 MMOL/L (ref 0–18)
AST SERPL-CCNC: 15 U/L (ref 15–37)
BILIRUB DIRECT SERPL-MCNC: 0.2 MG/DL (ref 0–0.2)
BILIRUB SERPL-MCNC: 0.4 MG/DL (ref 0.1–2)
BUN BLD-MCNC: 22 MG/DL (ref 7–18)
CALCIUM BLD-MCNC: 9.5 MG/DL (ref 8.5–10.1)
CHLORIDE SERPL-SCNC: 110 MMOL/L (ref 98–112)
CO2 SERPL-SCNC: 29 MMOL/L (ref 21–32)
CREAT BLD-MCNC: 1.08 MG/DL
FASTING STATUS PATIENT QL REPORTED: NO
GFR SERPLBLD BASED ON 1.73 SQ M-ARVRAT: 56 ML/MIN/1.73M2 (ref 60–?)
GLUCOSE BLD-MCNC: 114 MG/DL (ref 70–99)
OSMOLALITY SERPL CALC.SUM OF ELEC: 298 MOSM/KG (ref 275–295)
POTASSIUM SERPL-SCNC: 3.9 MMOL/L (ref 3.5–5.1)
PROT SERPL-MCNC: 6.7 G/DL (ref 6.4–8.2)
SODIUM SERPL-SCNC: 142 MMOL/L (ref 136–145)

## 2022-11-18 PROCEDURE — 80076 HEPATIC FUNCTION PANEL: CPT | Performed by: INTERNAL MEDICINE

## 2022-11-18 PROCEDURE — 80048 BASIC METABOLIC PNL TOTAL CA: CPT

## 2022-11-18 PROCEDURE — 36415 COLL VENOUS BLD VENIPUNCTURE: CPT

## 2022-12-01 RX ORDER — CARVEDILOL 6.25 MG/1
6.25 TABLET ORAL 2 TIMES DAILY WITH MEALS
Qty: 180 TABLET | Refills: 0 | Status: SHIPPED | OUTPATIENT
Start: 2022-12-01

## 2022-12-19 DIAGNOSIS — I15.2 HYPERTENSION ASSOCIATED WITH DIABETES (HCC): ICD-10-CM

## 2022-12-19 DIAGNOSIS — R80.9 TYPE 2 DIABETES MELLITUS WITH ALBUMINURIA (HCC): ICD-10-CM

## 2022-12-19 DIAGNOSIS — E78.5 HYPERLIPIDEMIA ASSOCIATED WITH TYPE 2 DIABETES MELLITUS (HCC): ICD-10-CM

## 2022-12-19 DIAGNOSIS — E11.59 HYPERTENSION ASSOCIATED WITH DIABETES (HCC): ICD-10-CM

## 2022-12-19 DIAGNOSIS — E11.29 TYPE 2 DIABETES MELLITUS WITH ALBUMINURIA (HCC): ICD-10-CM

## 2022-12-19 DIAGNOSIS — E11.9 TYPE 2 DIABETES MELLITUS WITHOUT COMPLICATION, WITHOUT LONG-TERM CURRENT USE OF INSULIN (HCC): ICD-10-CM

## 2022-12-19 DIAGNOSIS — E11.69 HYPERLIPIDEMIA ASSOCIATED WITH TYPE 2 DIABETES MELLITUS (HCC): ICD-10-CM

## 2022-12-19 RX ORDER — LOSARTAN POTASSIUM 25 MG/1
25 TABLET ORAL EVERY EVENING
Qty: 90 TABLET | Refills: 0 | Status: CANCELLED | OUTPATIENT
Start: 2022-12-19

## 2022-12-20 RX ORDER — LOSARTAN POTASSIUM 50 MG/1
50 TABLET ORAL DAILY
Qty: 90 TABLET | Refills: 0 | Status: SHIPPED | OUTPATIENT
Start: 2022-12-20

## 2023-01-30 ENCOUNTER — OFFICE VISIT (OUTPATIENT)
Dept: INTERNAL MEDICINE CLINIC | Facility: CLINIC | Age: 73
End: 2023-01-30
Payer: MEDICARE

## 2023-01-30 ENCOUNTER — LAB ENCOUNTER (OUTPATIENT)
Dept: LAB | Age: 73
End: 2023-01-30
Attending: INTERNAL MEDICINE
Payer: MEDICARE

## 2023-01-30 VITALS
TEMPERATURE: 98 F | OXYGEN SATURATION: 99 % | RESPIRATION RATE: 16 BRPM | BODY MASS INDEX: 28.67 KG/M2 | HEART RATE: 86 BPM | HEIGHT: 62.79 IN | DIASTOLIC BLOOD PRESSURE: 78 MMHG | WEIGHT: 159.81 LBS | SYSTOLIC BLOOD PRESSURE: 130 MMHG

## 2023-01-30 DIAGNOSIS — E78.5 HYPERLIPIDEMIA ASSOCIATED WITH TYPE 2 DIABETES MELLITUS (HCC): ICD-10-CM

## 2023-01-30 DIAGNOSIS — R80.9 TYPE 2 DIABETES MELLITUS WITH ALBUMINURIA (HCC): ICD-10-CM

## 2023-01-30 DIAGNOSIS — E11.69 HYPERLIPIDEMIA ASSOCIATED WITH TYPE 2 DIABETES MELLITUS (HCC): ICD-10-CM

## 2023-01-30 DIAGNOSIS — E11.59 HYPERTENSION ASSOCIATED WITH DIABETES (HCC): ICD-10-CM

## 2023-01-30 DIAGNOSIS — E66.3 OVERWEIGHT (BMI 25.0-29.9): ICD-10-CM

## 2023-01-30 DIAGNOSIS — E11.29 TYPE 2 DIABETES MELLITUS WITH ALBUMINURIA (HCC): ICD-10-CM

## 2023-01-30 DIAGNOSIS — F33.1 MDD (MAJOR DEPRESSIVE DISORDER), RECURRENT EPISODE, MODERATE (HCC): ICD-10-CM

## 2023-01-30 DIAGNOSIS — K76.0 FATTY LIVER DETERMINED BY BIOPSY: ICD-10-CM

## 2023-01-30 DIAGNOSIS — Z00.00 ROUTINE GENERAL MEDICAL EXAMINATION AT A HEALTH CARE FACILITY: ICD-10-CM

## 2023-01-30 DIAGNOSIS — I15.2 HYPERTENSION ASSOCIATED WITH DIABETES (HCC): ICD-10-CM

## 2023-01-30 DIAGNOSIS — Z00.00 ROUTINE GENERAL MEDICAL EXAMINATION AT A HEALTH CARE FACILITY: Primary | ICD-10-CM

## 2023-01-30 PROBLEM — E11.9 TYPE 2 DIABETES MELLITUS WITHOUT COMPLICATION, WITHOUT LONG-TERM CURRENT USE OF INSULIN (HCC): Status: RESOLVED | Noted: 2019-02-12 | Resolved: 2023-01-30

## 2023-01-30 PROBLEM — R19.7 DIARRHEA: Status: RESOLVED | Noted: 2019-02-12 | Resolved: 2023-01-30

## 2023-01-30 LAB
CHOLEST SERPL-MCNC: 145 MG/DL (ref ?–200)
CREAT UR-SCNC: 117 MG/DL
EST. AVERAGE GLUCOSE BLD GHB EST-MCNC: 151 MG/DL (ref 68–126)
FASTING PATIENT LIPID ANSWER: YES
HBA1C MFR BLD: 6.9 % (ref ?–5.7)
HDLC SERPL-MCNC: 55 MG/DL (ref 40–59)
LDLC SERPL CALC-MCNC: 66 MG/DL (ref ?–100)
MICROALBUMIN UR-MCNC: 4.35 MG/DL
MICROALBUMIN/CREAT 24H UR-RTO: 37.2 UG/MG (ref ?–30)
NONHDLC SERPL-MCNC: 90 MG/DL (ref ?–130)
TRIGL SERPL-MCNC: 142 MG/DL (ref 30–149)
VLDLC SERPL CALC-MCNC: 21 MG/DL (ref 0–30)

## 2023-01-30 PROCEDURE — 36415 COLL VENOUS BLD VENIPUNCTURE: CPT

## 2023-01-30 PROCEDURE — G0439 PPPS, SUBSEQ VISIT: HCPCS | Performed by: INTERNAL MEDICINE

## 2023-01-30 PROCEDURE — 99214 OFFICE O/P EST MOD 30 MIN: CPT | Performed by: INTERNAL MEDICINE

## 2023-01-30 PROCEDURE — 82043 UR ALBUMIN QUANTITATIVE: CPT

## 2023-01-30 PROCEDURE — 1125F AMNT PAIN NOTED PAIN PRSNT: CPT | Performed by: INTERNAL MEDICINE

## 2023-01-30 PROCEDURE — 80061 LIPID PANEL: CPT

## 2023-01-30 PROCEDURE — 82570 ASSAY OF URINE CREATININE: CPT

## 2023-01-30 PROCEDURE — 83036 HEMOGLOBIN GLYCOSYLATED A1C: CPT

## 2023-01-30 RX ORDER — BUPROPION HYDROCHLORIDE 150 MG/1
150 TABLET ORAL EVERY MORNING
Qty: 30 TABLET | Refills: 1 | Status: SHIPPED | OUTPATIENT
Start: 2023-01-30

## 2023-01-30 RX ORDER — METOPROLOL TARTRATE 50 MG/1
50 TABLET, FILM COATED ORAL EVERY EVENING
COMMUNITY
Start: 2022-12-27

## 2023-01-30 NOTE — PATIENT INSTRUCTIONS
Please decrease your buproprion to 150 mg daily for 4 weeks and then every other day for 4 weeks. If your tremor is not improving as we decrease/stop the buproprion, then we will need further evaluation by a neurologist.    Please increase the prozac to 40 mg every day. Please send me a my-chart message in 2 weeks about these adjustments. For your high blood pressure, please only continue losartan 100 mg every evening. But please make sure to follow proper technique when checking your pressures. Please bring your machine into your next office visit to ensure it is accurate. Please measure your blood pressure and pulse daily and record these values. Please measure your blood pressure after when you have been relaxing for at least 5 minutes. Both feet should be flat on the ground and you should be seated. Please bring these values in at your next visit. Please call us if your blood pressure is greater than 130/80 on 3 occasions.

## 2023-02-16 ENCOUNTER — HOSPITAL ENCOUNTER (OUTPATIENT)
Dept: MAMMOGRAPHY | Age: 73
Discharge: HOME | End: 2023-02-16
Attending: INTERNAL MEDICINE
Payer: MEDICARE

## 2023-02-16 DIAGNOSIS — I10 ESSENTIAL HYPERTENSION: ICD-10-CM

## 2023-02-16 DIAGNOSIS — E78.2 MIXED HYPERLIPIDEMIA: ICD-10-CM

## 2023-02-16 DIAGNOSIS — Z12.31 ENCOUNTER FOR SCREENING MAMMOGRAM FOR MALIGNANT NEOPLASM OF BREAST: ICD-10-CM

## 2023-02-16 DIAGNOSIS — F33.1 MDD (MAJOR DEPRESSIVE DISORDER), RECURRENT EPISODE, MODERATE (HCC): ICD-10-CM

## 2023-02-16 PROCEDURE — 77067 SCR MAMMO BI INCL CAD: CPT | Performed by: INTERNAL MEDICINE

## 2023-02-16 PROCEDURE — 77063 BREAST TOMOSYNTHESIS BI: CPT | Performed by: INTERNAL MEDICINE

## 2023-02-16 RX ORDER — ATORVASTATIN CALCIUM 20 MG/1
20 TABLET, FILM COATED ORAL DAILY
Qty: 90 TABLET | Refills: 3 | Status: SHIPPED | OUTPATIENT
Start: 2023-02-16

## 2023-02-16 NOTE — TELEPHONE ENCOUNTER
LOV: 1/30/2023 with Dr. Jono Hull  RTC: 4 weeks  Last Relevant Labs: 1/30/2023  Filled: 11/17/2022     #90 with 3 refills    Future Appointments   Date Time Provider Yadi Oates   3/13/2023 11:00 AM Hazel Velazquez MD [facility] Surg[facility]   4/17/2023 11:00 AM Mark Bernard MD [facility] [place]

## 2023-03-13 ENCOUNTER — OFFICE VISIT (OUTPATIENT)
Dept: SURGERY | Facility: CLINIC | Age: 73
End: 2023-03-13

## 2023-03-13 VITALS
RESPIRATION RATE: 16 BRPM | DIASTOLIC BLOOD PRESSURE: 75 MMHG | SYSTOLIC BLOOD PRESSURE: 148 MMHG | OXYGEN SATURATION: 99 % | HEART RATE: 76 BPM | WEIGHT: 155 LBS | HEIGHT: 62.79 IN | BODY MASS INDEX: 27.81 KG/M2

## 2023-03-13 DIAGNOSIS — R79.89 ABNORMAL LIVER FUNCTION TEST: Primary | ICD-10-CM

## 2023-05-31 ENCOUNTER — PATIENT MESSAGE (OUTPATIENT)
Dept: INTERNAL MEDICINE CLINIC | Facility: CLINIC | Age: 73
End: 2023-05-31

## 2023-05-31 NOTE — TELEPHONE ENCOUNTER
From: Reina Blood  To: Johan Rosas MD  Sent: 5/31/2023 9:16 AM CDT  Subject: Blood pressures     Hi Sophy Cee following up as you requested with 14 days of blood pressure testing. Results are an average of 3 pressures taken 10 minutes apart each day. 5/19. 128/65  5/20 139/67  5/21 132/61  5/22 128/63  5/23. 133/59  5/24 121/62  5/25 126/62  5/26 134/56  5/27 127/60  5/28 122/65  5/29 117/58  5/30 123/54  5/31 110/52    Feeling pretty good about these.      Luis Gomez

## 2023-06-08 ENCOUNTER — PATIENT MESSAGE (OUTPATIENT)
Dept: INTERNAL MEDICINE CLINIC | Facility: CLINIC | Age: 73
End: 2023-06-08

## 2023-06-20 ENCOUNTER — TELEPHONE (OUTPATIENT)
Dept: INTERNAL MEDICINE CLINIC | Facility: CLINIC | Age: 73
End: 2023-06-20

## 2023-08-08 ENCOUNTER — TELEPHONE (OUTPATIENT)
Dept: INTERNAL MEDICINE CLINIC | Facility: CLINIC | Age: 73
End: 2023-08-08

## 2023-08-08 NOTE — TELEPHONE ENCOUNTER
Incoming fax from [employer] form request to be filled out. Placed in [address] in basket for review and signature.

## 2023-08-14 ENCOUNTER — LAB ENCOUNTER (OUTPATIENT)
Dept: LAB | Age: 73
End: 2023-08-14
Attending: INTERNAL MEDICINE
Payer: MEDICARE

## 2023-08-14 DIAGNOSIS — I15.2 HYPERTENSION ASSOCIATED WITH DIABETES: ICD-10-CM

## 2023-08-14 DIAGNOSIS — R80.9 TYPE 2 DIABETES MELLITUS WITH ALBUMINURIA: ICD-10-CM

## 2023-08-14 DIAGNOSIS — E11.59 HYPERTENSION ASSOCIATED WITH DIABETES: ICD-10-CM

## 2023-08-14 DIAGNOSIS — E78.5 HYPERLIPIDEMIA ASSOCIATED WITH TYPE 2 DIABETES MELLITUS: ICD-10-CM

## 2023-08-14 DIAGNOSIS — E11.69 HYPERLIPIDEMIA ASSOCIATED WITH TYPE 2 DIABETES MELLITUS: ICD-10-CM

## 2023-08-14 DIAGNOSIS — E11.29 TYPE 2 DIABETES MELLITUS WITH ALBUMINURIA: ICD-10-CM

## 2023-08-14 DIAGNOSIS — F33.1 MDD (MAJOR DEPRESSIVE DISORDER), RECURRENT EPISODE, MODERATE (HCC): ICD-10-CM

## 2023-08-14 LAB
ALBUMIN SERPL-MCNC: 3.7 G/DL (ref 3.4–5)
ALP LIVER SERPL-CCNC: 258 U/L
ALT SERPL-CCNC: 52 U/L
ANION GAP SERPL CALC-SCNC: 6 MMOL/L (ref 0–18)
AST SERPL-CCNC: 38 U/L (ref 15–37)
BILIRUB DIRECT SERPL-MCNC: 0.2 MG/DL (ref 0–0.2)
BILIRUB SERPL-MCNC: 0.6 MG/DL (ref 0.1–2)
BUN BLD-MCNC: 16 MG/DL (ref 7–18)
CALCIUM BLD-MCNC: 9.3 MG/DL (ref 8.5–10.1)
CHLORIDE SERPL-SCNC: 108 MMOL/L (ref 98–112)
CO2 SERPL-SCNC: 25 MMOL/L (ref 21–32)
CREAT BLD-MCNC: 1.1 MG/DL
EGFRCR SERPLBLD CKD-EPI 2021: 54 ML/MIN/1.73M2 (ref 60–?)
FASTING STATUS PATIENT QL REPORTED: NO
GLUCOSE BLD-MCNC: 128 MG/DL (ref 70–99)
OSMOLALITY SERPL CALC.SUM OF ELEC: 291 MOSM/KG (ref 275–295)
POTASSIUM SERPL-SCNC: 4.1 MMOL/L (ref 3.5–5.1)
PROT SERPL-MCNC: 7.3 G/DL (ref 6.4–8.2)
SODIUM SERPL-SCNC: 139 MMOL/L (ref 136–145)

## 2023-08-14 PROCEDURE — 83036 HEMOGLOBIN GLYCOSYLATED A1C: CPT

## 2023-08-14 PROCEDURE — 80048 BASIC METABOLIC PNL TOTAL CA: CPT

## 2023-08-14 PROCEDURE — 80076 HEPATIC FUNCTION PANEL: CPT

## 2023-08-14 PROCEDURE — 36415 COLL VENOUS BLD VENIPUNCTURE: CPT

## 2023-08-16 LAB
EST. AVERAGE GLUCOSE BLD GHB EST-MCNC: 189 MG/DL (ref 68–126)
HBA1C MFR BLD: 8.2 % (ref ?–5.7)

## 2023-08-21 ENCOUNTER — TELEMEDICINE (OUTPATIENT)
Dept: INTERNAL MEDICINE CLINIC | Facility: CLINIC | Age: 73
End: 2023-08-21
Payer: MEDICARE

## 2023-08-21 DIAGNOSIS — F33.1 MDD (MAJOR DEPRESSIVE DISORDER), RECURRENT EPISODE, MODERATE (HCC): ICD-10-CM

## 2023-08-21 DIAGNOSIS — E78.5 HYPERLIPIDEMIA ASSOCIATED WITH TYPE 2 DIABETES MELLITUS: Primary | ICD-10-CM

## 2023-08-21 DIAGNOSIS — E11.69 HYPERLIPIDEMIA ASSOCIATED WITH TYPE 2 DIABETES MELLITUS: Primary | ICD-10-CM

## 2023-08-21 DIAGNOSIS — R80.9 TYPE 2 DIABETES MELLITUS WITH ALBUMINURIA: ICD-10-CM

## 2023-08-21 DIAGNOSIS — I15.2 HYPERTENSION ASSOCIATED WITH DIABETES: ICD-10-CM

## 2023-08-21 DIAGNOSIS — E11.59 HYPERTENSION ASSOCIATED WITH DIABETES: ICD-10-CM

## 2023-08-21 DIAGNOSIS — E11.29 TYPE 2 DIABETES MELLITUS WITH ALBUMINURIA: ICD-10-CM

## 2023-08-21 PROCEDURE — 99214 OFFICE O/P EST MOD 30 MIN: CPT | Performed by: INTERNAL MEDICINE
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
## 2023-08-23 ENCOUNTER — TELEPHONE (OUTPATIENT)
Dept: INTERNAL MEDICINE CLINIC | Facility: CLINIC | Age: 73
End: 2023-08-23

## 2023-08-23 NOTE — TELEPHONE ENCOUNTER
Patient came and dropped of from [facility] Patient Assistance Program Application to be filled out by Dr Etelvina Wiley. Placed in Dr Etelvina Wiley folder.       Please fax once completed  [phone]

## 2023-08-28 NOTE — TELEPHONE ENCOUNTER
Completed form faxed  Confirmation received   Sent to scan and copy placed in accordion     Copy placed at  for patient to

## 2023-09-15 ENCOUNTER — TELEPHONE (OUTPATIENT)
Dept: INTERNAL MEDICINE CLINIC | Facility: CLINIC | Age: 73
End: 2023-09-15

## 2023-09-20 NOTE — TELEPHONE ENCOUNTER
[employer] requiring most recent 30-day testing log and last A1C. Per Dr. Ellen Farias please call patient for most recent 30-day testing log, not on file. Patient states she will be able to drop off 30-day log in a couple weeks, patient just learned a couple days ago through [employer] [facility] [employer] this is needed for her testing supplies. Form located on Latanya's desk and awaiting BS log from patient.

## 2023-10-11 ENCOUNTER — HOSPITAL ENCOUNTER (OUTPATIENT)
Age: 73
Discharge: HOME | End: 2023-10-11
Attending: EMERGENCY MEDICINE
Payer: MEDICARE

## 2023-10-11 VITALS
HEART RATE: 81 BPM | RESPIRATION RATE: 18 BRPM | WEIGHT: 150 LBS | DIASTOLIC BLOOD PRESSURE: 53 MMHG | TEMPERATURE: 97 F | BODY MASS INDEX: 26.58 KG/M2 | SYSTOLIC BLOOD PRESSURE: 142 MMHG | OXYGEN SATURATION: 99 % | HEIGHT: 63 IN

## 2023-10-11 DIAGNOSIS — M25.50 ARTHRALGIA, UNSPECIFIED JOINT: Primary | ICD-10-CM

## 2023-10-11 PROCEDURE — 99213 OFFICE O/P EST LOW 20 MIN: CPT

## 2023-10-11 PROCEDURE — 99214 OFFICE O/P EST MOD 30 MIN: CPT

## 2023-10-11 RX ORDER — HYDROCODONE BITARTRATE AND ACETAMINOPHEN 5; 325 MG/1; MG/1
1 TABLET ORAL EVERY 6 HOURS PRN
Qty: 12 TABLET | Refills: 0 | Status: SHIPPED | OUTPATIENT
Start: 2023-10-11 | End: 2023-10-16

## 2023-10-11 RX ORDER — KETOROLAC TROMETHAMINE 10 MG/1
10 TABLET, FILM COATED ORAL 2 TIMES DAILY PRN
Qty: 20 TABLET | Refills: 0 | Status: SHIPPED | OUTPATIENT
Start: 2023-10-11 | End: 2023-10-18

## 2023-10-11 RX ORDER — TRAMADOL HYDROCHLORIDE 50 MG/1
50 TABLET ORAL EVERY 6 HOURS PRN
Qty: 15 TABLET | Refills: 0 | Status: SHIPPED | OUTPATIENT
Start: 2023-10-11 | End: 2023-10-16

## 2023-10-11 RX ORDER — ONDANSETRON 4 MG/1
4 TABLET, ORALLY DISINTEGRATING ORAL EVERY 4 HOURS PRN
Qty: 10 TABLET | Refills: 0 | Status: SHIPPED | OUTPATIENT
Start: 2023-10-11 | End: 2023-10-18

## 2023-10-11 NOTE — ED INITIAL ASSESSMENT (HPI)
C/o left arm pain since June from overuse. Saturday and Sunday \"all my joints are hurting\". Using Tylenol ES, last dose at 0900. Denies fevers.

## 2023-10-11 NOTE — DISCHARGE INSTRUCTIONS
Medications as needed. The tramadol may make you drowsy so do not work or drive when taking. The ketorolac can be hard on your stomach so take it with food, not on an empty stomach.

## 2023-10-12 NOTE — TELEPHONE ENCOUNTER
Faxed 30-day BS testing log, last A1C result, and Diabetes Testing Supplies Physician Order Form to [facility] ([facility]) Fax #[phone].

## 2023-11-17 ENCOUNTER — LAB ENCOUNTER (OUTPATIENT)
Dept: LAB | Age: 73
End: 2023-11-17
Attending: INTERNAL MEDICINE
Payer: MEDICARE

## 2023-11-17 DIAGNOSIS — I15.2 HYPERTENSION ASSOCIATED WITH DIABETES: ICD-10-CM

## 2023-11-17 DIAGNOSIS — R79.89 ABNORMAL LIVER FUNCTION TEST: ICD-10-CM

## 2023-11-17 DIAGNOSIS — E78.5 HYPERLIPIDEMIA ASSOCIATED WITH TYPE 2 DIABETES MELLITUS: ICD-10-CM

## 2023-11-17 DIAGNOSIS — R80.9 TYPE 2 DIABETES MELLITUS WITH ALBUMINURIA: ICD-10-CM

## 2023-11-17 DIAGNOSIS — E11.29 TYPE 2 DIABETES MELLITUS WITH ALBUMINURIA: ICD-10-CM

## 2023-11-17 DIAGNOSIS — E11.59 HYPERTENSION ASSOCIATED WITH DIABETES: ICD-10-CM

## 2023-11-17 DIAGNOSIS — E11.69 HYPERLIPIDEMIA ASSOCIATED WITH TYPE 2 DIABETES MELLITUS: ICD-10-CM

## 2023-11-17 DIAGNOSIS — F33.1 MDD (MAJOR DEPRESSIVE DISORDER), RECURRENT EPISODE, MODERATE (HCC): ICD-10-CM

## 2023-11-17 LAB
ALBUMIN SERPL-MCNC: 3.8 G/DL (ref 3.4–5)
ALBUMIN/GLOB SERPL: 1 {RATIO} (ref 1–2)
ALP LIVER SERPL-CCNC: 163 U/L
ALT SERPL-CCNC: 48 U/L
ANION GAP SERPL CALC-SCNC: 6 MMOL/L (ref 0–18)
AST SERPL-CCNC: 34 U/L (ref 15–37)
BILIRUB SERPL-MCNC: 1.1 MG/DL (ref 0.1–2)
BUN BLD-MCNC: 23 MG/DL (ref 9–23)
CALCIUM BLD-MCNC: 9.2 MG/DL (ref 8.5–10.1)
CHLORIDE SERPL-SCNC: 112 MMOL/L (ref 98–112)
CO2 SERPL-SCNC: 24 MMOL/L (ref 21–32)
CREAT BLD-MCNC: 1.17 MG/DL
EGFRCR SERPLBLD CKD-EPI 2021: 50 ML/MIN/1.73M2 (ref 60–?)
FASTING STATUS PATIENT QL REPORTED: YES
GLOBULIN PLAS-MCNC: 3.8 G/DL (ref 2.8–4.4)
GLUCOSE BLD-MCNC: 126 MG/DL (ref 70–99)
OSMOLALITY SERPL CALC.SUM OF ELEC: 299 MOSM/KG (ref 275–295)
POTASSIUM SERPL-SCNC: 3.9 MMOL/L (ref 3.5–5.1)
PROT SERPL-MCNC: 7.6 G/DL (ref 6.4–8.2)
SODIUM SERPL-SCNC: 142 MMOL/L (ref 136–145)

## 2023-11-17 PROCEDURE — 83036 HEMOGLOBIN GLYCOSYLATED A1C: CPT

## 2023-11-17 PROCEDURE — 80053 COMPREHEN METABOLIC PANEL: CPT

## 2023-11-17 PROCEDURE — 36415 COLL VENOUS BLD VENIPUNCTURE: CPT

## 2023-11-18 LAB
EST. AVERAGE GLUCOSE BLD GHB EST-MCNC: 143 MG/DL (ref 68–126)
HBA1C MFR BLD: 6.6 % (ref ?–5.7)

## 2023-12-13 ENCOUNTER — HOSPITAL ENCOUNTER (OUTPATIENT)
Dept: ULTRASOUND IMAGING | Age: 73
Discharge: HOME | End: 2023-12-13
Attending: INTERNAL MEDICINE
Payer: MEDICARE

## 2023-12-13 DIAGNOSIS — R79.89 ABNORMAL LIVER FUNCTION TEST: ICD-10-CM

## 2023-12-13 PROCEDURE — 76705 ECHO EXAM OF ABDOMEN: CPT | Performed by: INTERNAL MEDICINE

## 2023-12-13 PROCEDURE — 76981 USE PARENCHYMA: CPT | Performed by: INTERNAL MEDICINE

## 2023-12-18 ENCOUNTER — OFFICE VISIT (OUTPATIENT)
Dept: SURGERY | Facility: CLINIC | Age: 73
End: 2023-12-18

## 2023-12-18 ENCOUNTER — TELEPHONE (OUTPATIENT)
Dept: INTERNAL MEDICINE CLINIC | Facility: CLINIC | Age: 73
End: 2023-12-18

## 2023-12-18 VITALS
DIASTOLIC BLOOD PRESSURE: 77 MMHG | TEMPERATURE: 98 F | SYSTOLIC BLOOD PRESSURE: 141 MMHG | BODY MASS INDEX: 26 KG/M2 | WEIGHT: 149.19 LBS | OXYGEN SATURATION: 77 % | RESPIRATION RATE: 16 BRPM | HEART RATE: 76 BPM

## 2023-12-18 DIAGNOSIS — R79.89 ABNORMAL LIVER FUNCTION TEST: Primary | ICD-10-CM

## 2023-12-18 DIAGNOSIS — K75.81 NASH (NONALCOHOLIC STEATOHEPATITIS): ICD-10-CM

## 2024-02-03 DIAGNOSIS — I15.2 HYPERTENSION ASSOCIATED WITH DIABETES (HCC): Primary | ICD-10-CM

## 2024-02-03 DIAGNOSIS — E11.59 HYPERTENSION ASSOCIATED WITH DIABETES (HCC): Primary | ICD-10-CM

## 2024-02-05 RX ORDER — LOSARTAN POTASSIUM 100 MG/1
100 TABLET ORAL EVERY EVENING
Qty: 90 TABLET | Refills: 2 | Status: SHIPPED | OUTPATIENT
Start: 2024-02-05

## 2024-02-05 NOTE — TELEPHONE ENCOUNTER
Requesting    Name from pharmacy: Losartan Potassium Oral Tablet 100 MG         Will file in chart as: LOSARTAN 100 MG Oral Tab    Sig: TAKE 1 TABLET BY MOUTH IN THE EVENING    Disp: 90 tablet    Refills: 0    Start: 2/3/2024    Class: Normal    Non-formulary    Last ordered: 11 months ago (2/20/2023) by Mary Pickard MD    Last refill: 11/3/2023    Rx #: [id number]    Hypertension Medications Protocol [id number]/03/2024 11:23 AM   Protocol Details CMP or BMP in past 12 months    Last serum creatinine< 2.0    Appointment in past 6 or next 3 months        LOV: 8/21/2023  RTC: 5 months   Last Relevant Labs: 11/17/2023  Filled: 2/20/2023 #90 with 3 refills    Future Appointments   Date Time Provider Department Center   2/21/2024 11:30 AM Mary Pickard MD [facility] [place]   12/16/2024 11:45 AM Jony Cooper MD [facility]

## 2024-02-21 ENCOUNTER — OFFICE VISIT (OUTPATIENT)
Dept: INTERNAL MEDICINE CLINIC | Facility: CLINIC | Age: 74
End: 2024-02-21
Payer: MEDICARE

## 2024-02-21 VITALS
WEIGHT: 150 LBS | OXYGEN SATURATION: 99 % | RESPIRATION RATE: 16 BRPM | HEIGHT: 62.6 IN | TEMPERATURE: 98 F | BODY MASS INDEX: 26.91 KG/M2 | HEART RATE: 70 BPM | SYSTOLIC BLOOD PRESSURE: 120 MMHG | DIASTOLIC BLOOD PRESSURE: 72 MMHG

## 2024-02-21 DIAGNOSIS — E78.5 HYPERLIPIDEMIA ASSOCIATED WITH TYPE 2 DIABETES MELLITUS (HCC): ICD-10-CM

## 2024-02-21 DIAGNOSIS — Z12.11 SCREEN FOR COLON CANCER: ICD-10-CM

## 2024-02-21 DIAGNOSIS — Z00.00 ROUTINE GENERAL MEDICAL EXAMINATION AT A HEALTH CARE FACILITY: Primary | ICD-10-CM

## 2024-02-21 DIAGNOSIS — R80.9 TYPE 2 DIABETES MELLITUS WITH ALBUMINURIA (HCC): ICD-10-CM

## 2024-02-21 DIAGNOSIS — E11.69 HYPERLIPIDEMIA ASSOCIATED WITH TYPE 2 DIABETES MELLITUS (HCC): ICD-10-CM

## 2024-02-21 DIAGNOSIS — E11.29 TYPE 2 DIABETES MELLITUS WITH ALBUMINURIA (HCC): ICD-10-CM

## 2024-02-21 DIAGNOSIS — I15.2 HYPERTENSION ASSOCIATED WITH DIABETES (HCC): ICD-10-CM

## 2024-02-21 DIAGNOSIS — E66.3 OVERWEIGHT (BMI 25.0-29.9): ICD-10-CM

## 2024-02-21 DIAGNOSIS — E11.59 HYPERTENSION ASSOCIATED WITH DIABETES (HCC): ICD-10-CM

## 2024-02-21 DIAGNOSIS — C44.319 BASAL CELL CARCINOMA (BCC) OF SKIN OF OTHER PART OF FACE: ICD-10-CM

## 2024-02-21 DIAGNOSIS — Z12.31 ENCOUNTER FOR MAMMOGRAM TO ESTABLISH BASELINE MAMMOGRAM: ICD-10-CM

## 2024-02-21 DIAGNOSIS — K76.0 FATTY LIVER DETERMINED BY BIOPSY: ICD-10-CM

## 2024-02-21 PROBLEM — R74.01 TRANSAMINITIS: Status: RESOLVED | Noted: 2019-02-12 | Resolved: 2024-02-21

## 2024-02-21 PROBLEM — F33.1 MDD (MAJOR DEPRESSIVE DISORDER), RECURRENT EPISODE, MODERATE (HCC): Status: RESOLVED | Noted: 2018-04-30 | Resolved: 2024-02-21

## 2024-02-21 PROCEDURE — 99214 OFFICE O/P EST MOD 30 MIN: CPT | Performed by: INTERNAL MEDICINE

## 2024-02-21 PROCEDURE — G0439 PPPS, SUBSEQ VISIT: HCPCS | Performed by: INTERNAL MEDICINE

## 2024-02-21 RX ORDER — ATORVASTATIN CALCIUM 20 MG/1
20 TABLET, FILM COATED ORAL DAILY
Qty: 90 TABLET | Refills: 3 | Status: SHIPPED | OUTPATIENT
Start: 2024-02-21
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
## 2024-02-27 ENCOUNTER — LAB ENCOUNTER (OUTPATIENT)
Dept: LAB | Age: 74
End: 2024-02-27
Attending: INTERNAL MEDICINE
Payer: MEDICARE

## 2024-02-27 DIAGNOSIS — Z12.11 SCREEN FOR COLON CANCER: ICD-10-CM

## 2024-02-27 LAB — HEMOCCULT STL QL: NEGATIVE

## 2024-02-27 PROCEDURE — 82274 ASSAY TEST FOR BLOOD FECAL: CPT

## 2024-04-16 ENCOUNTER — HOSPITAL ENCOUNTER (OUTPATIENT)
Dept: MAMMOGRAPHY | Age: 74
Discharge: HOME | End: 2024-04-16
Attending: INTERNAL MEDICINE
Payer: MEDICARE

## 2024-04-16 DIAGNOSIS — Z12.31 ENCOUNTER FOR MAMMOGRAM TO ESTABLISH BASELINE MAMMOGRAM: ICD-10-CM

## 2024-04-16 PROCEDURE — 77067 SCR MAMMO BI INCL CAD: CPT | Performed by: INTERNAL MEDICINE

## 2024-04-16 PROCEDURE — 77063 BREAST TOMOSYNTHESIS BI: CPT | Performed by: INTERNAL MEDICINE

## 2024-04-17 PROBLEM — D69.2 PURPURA (HCC): Status: ACTIVE | Noted: 2024-04-08

## 2024-04-17 PROBLEM — D69.2 PURPURA: Status: ACTIVE | Noted: 2024-04-08

## 2024-06-11 ENCOUNTER — LAB ENCOUNTER (OUTPATIENT)
Dept: LAB | Age: 74
End: 2024-06-11
Attending: INTERNAL MEDICINE
Payer: MEDICARE

## 2024-06-11 DIAGNOSIS — I15.2 HYPERTENSION ASSOCIATED WITH DIABETES (HCC): ICD-10-CM

## 2024-06-11 DIAGNOSIS — E11.59 HYPERTENSION ASSOCIATED WITH DIABETES (HCC): ICD-10-CM

## 2024-06-11 LAB
CHOLEST SERPL-MCNC: 113 MG/DL (ref ?–200)
CREAT UR-SCNC: 129.7 MG/DL
EST. AVERAGE GLUCOSE BLD GHB EST-MCNC: 128 MG/DL (ref 68–126)
FASTING PATIENT LIPID ANSWER: YES
HBA1C MFR BLD: 6.1 % (ref ?–5.7)
HDLC SERPL-MCNC: 36 MG/DL (ref 40–59)
LDLC SERPL CALC-MCNC: 56 MG/DL (ref ?–100)
MICROALBUMIN UR-MCNC: 1.9 MG/DL
MICROALBUMIN/CREAT 24H UR-RTO: 14.6 UG/MG (ref ?–30)
NONHDLC SERPL-MCNC: 77 MG/DL (ref ?–130)
TRIGL SERPL-MCNC: 115 MG/DL (ref 30–149)
VLDLC SERPL CALC-MCNC: 17 MG/DL (ref 0–30)

## 2024-06-11 PROCEDURE — 36415 COLL VENOUS BLD VENIPUNCTURE: CPT

## 2024-06-11 PROCEDURE — 80061 LIPID PANEL: CPT

## 2024-06-11 PROCEDURE — 82570 ASSAY OF URINE CREATININE: CPT

## 2024-06-11 PROCEDURE — 83036 HEMOGLOBIN GLYCOSYLATED A1C: CPT

## 2024-06-11 PROCEDURE — 82043 UR ALBUMIN QUANTITATIVE: CPT

## 2024-06-12 ENCOUNTER — TELEPHONE (OUTPATIENT)
Dept: INTERNAL MEDICINE CLINIC | Facility: CLINIC | Age: 74
End: 2024-06-12

## 2024-06-12 NOTE — TELEPHONE ENCOUNTER
Patient requested a video office visit.     Per patient this was discussed at her lat apt.     Video visit is for A1C check and to refill diabetic test strips with a third party facility due to her Medicare insurance not covering the strip any longer.     Please advise if VV is ok

## 2024-06-26 ENCOUNTER — TELEPHONE (OUTPATIENT)
Dept: INTERNAL MEDICINE CLINIC | Facility: CLINIC | Age: 74
End: 2024-06-26

## 2024-06-28 ENCOUNTER — TELEMEDICINE (OUTPATIENT)
Dept: INTERNAL MEDICINE CLINIC | Facility: CLINIC | Age: 74
End: 2024-06-28

## 2024-06-28 VITALS — BODY MASS INDEX: 24 KG/M2 | WEIGHT: 138 LBS

## 2024-06-28 DIAGNOSIS — I15.2 HYPERTENSION ASSOCIATED WITH DIABETES (HCC): ICD-10-CM

## 2024-06-28 DIAGNOSIS — E11.59 HYPERTENSION ASSOCIATED WITH DIABETES (HCC): ICD-10-CM

## 2024-06-28 DIAGNOSIS — E11.69 HYPERLIPIDEMIA ASSOCIATED WITH TYPE 2 DIABETES MELLITUS (HCC): ICD-10-CM

## 2024-06-28 DIAGNOSIS — R07.89 OTHER CHEST PAIN: ICD-10-CM

## 2024-06-28 DIAGNOSIS — E11.29 TYPE 2 DIABETES MELLITUS WITH ALBUMINURIA (HCC): Primary | ICD-10-CM

## 2024-06-28 DIAGNOSIS — E78.5 HYPERLIPIDEMIA ASSOCIATED WITH TYPE 2 DIABETES MELLITUS (HCC): ICD-10-CM

## 2024-06-28 DIAGNOSIS — R80.9 TYPE 2 DIABETES MELLITUS WITH ALBUMINURIA (HCC): Primary | ICD-10-CM

## 2024-06-28 PROBLEM — D69.2 PURPURA: Status: RESOLVED | Noted: 2024-04-08 | Resolved: 2024-06-28

## 2024-06-28 PROBLEM — D69.2 PURPURA (HCC): Status: RESOLVED | Noted: 2024-04-08 | Resolved: 2024-06-28

## 2024-06-28 PROCEDURE — 99214 OFFICE O/P EST MOD 30 MIN: CPT | Performed by: INTERNAL MEDICINE

## 2024-06-28 PROCEDURE — G2211 COMPLEX E/M VISIT ADD ON: HCPCS | Performed by: INTERNAL MEDICINE
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
## 2024-07-01 ENCOUNTER — LAB ENCOUNTER (OUTPATIENT)
Dept: LAB | Age: 74
End: 2024-07-01
Attending: FAMILY MEDICINE
Payer: MEDICARE

## 2024-07-01 DIAGNOSIS — R19.5 LOOSE STOOLS: ICD-10-CM

## 2024-07-01 LAB
CRYPTOSP AG STL QL IA: NEGATIVE
G LAMBLIA AG STL QL IA: NEGATIVE

## 2024-07-01 PROCEDURE — 87427 SHIGA-LIKE TOXIN AG IA: CPT

## 2024-07-01 PROCEDURE — 83993 ASSAY FOR CALPROTECTIN FECAL: CPT

## 2024-07-01 PROCEDURE — 87493 C DIFF AMPLIFIED PROBE: CPT

## 2024-07-01 PROCEDURE — 87045 FECES CULTURE AEROBIC BACT: CPT

## 2024-07-01 PROCEDURE — 87329 GIARDIA AG IA: CPT

## 2024-07-01 PROCEDURE — 82656 EL-1 FECAL QUAL/SEMIQ: CPT

## 2024-07-01 PROCEDURE — 82705 FATS/LIPIDS FECES QUAL: CPT

## 2024-07-01 PROCEDURE — 87272 CRYPTOSPORIDIUM AG IF: CPT

## 2024-07-01 PROCEDURE — 87015 SPECIMEN INFECT AGNT CONCNTJ: CPT

## 2024-07-01 PROCEDURE — 87046 STOOL CULTR AEROBIC BACT EA: CPT

## 2024-07-02 LAB
C DIFF TOX B STL QL: POSITIVE
CALPROTECTIN STL-MCNT: 217 ΜG/G (ref ?–50)

## 2024-07-02 NOTE — PROGRESS NOTES
Reviewed results, called patient to discuss. Positive for C diff. Recommend prescription for vancomycin (rather than fidaxomicin due to upcoming holiday) 125mg QID for 14 days. If no improvement of diarrhea within 1-2 weeks, consider fidaxomicin. Denies any alarm symptoms including abdominal pain, fever, or rectal bleeding    Schedulers- Patient needs telehealth follow-up visit in 2-3 weeks with me    -discussed hygiene practices for c diff including avoid sharing bathroom if possible, proper cleaning of bathroom, hand washing. She understood

## 2024-07-05 LAB — PANCREATIC ELAST FECAL: >800 UG ELAST./G

## 2024-07-12 LAB
FATS NEUTRAL: NORMAL
FATS TOTAL: NORMAL

## 2024-07-16 ENCOUNTER — HOSPITAL ENCOUNTER (OUTPATIENT)
Dept: CV DIAGNOSTICS | Age: 74
Discharge: HOME | End: 2024-07-16
Attending: INTERNAL MEDICINE
Payer: MEDICARE

## 2024-07-16 DIAGNOSIS — R07.89 OTHER CHEST PAIN: ICD-10-CM

## 2024-07-16 PROCEDURE — 93017 CV STRESS TEST TRACING ONLY: CPT | Performed by: INTERNAL MEDICINE

## 2024-07-16 PROCEDURE — 93018 CV STRESS TEST I&R ONLY: CPT | Performed by: INTERNAL MEDICINE

## 2024-10-14 ENCOUNTER — TELEPHONE (OUTPATIENT)
Dept: INTERNAL MEDICINE CLINIC | Facility: CLINIC | Age: 74
End: 2024-10-14

## 2024-10-14 NOTE — TELEPHONE ENCOUNTER
Patient dropped off [facility] PATIENT ASSISTANCE PROGRAM to be signed. Form placed in Doctors folder at the .

## 2024-10-21 NOTE — TELEPHONE ENCOUNTER
Attempted to fax number on paperwork and failed twice. When trying to call the number listed for a fax number call will not dial out.  COPY IN [place] FOLDER     Found this information on [facility] website. For assistance with our program, please call our toll-free number  Monday - Friday from 8:30 am to 6:00 pm ET  Phone: [phone]  Fax: [phone]   Faxed to this number

## 2024-10-28 ENCOUNTER — PATIENT MESSAGE (OUTPATIENT)
Dept: INTERNAL MEDICINE CLINIC | Facility: CLINIC | Age: 74
End: 2024-10-28

## 2024-10-29 DIAGNOSIS — E11.59 HYPERTENSION ASSOCIATED WITH DIABETES (HCC): ICD-10-CM

## 2024-10-29 DIAGNOSIS — I15.2 HYPERTENSION ASSOCIATED WITH DIABETES (HCC): ICD-10-CM

## 2024-10-29 RX ORDER — LOSARTAN POTASSIUM 100 MG/1
100 TABLET ORAL EVERY EVENING
Qty: 90 TABLET | Refills: 3 | Status: SHIPPED | OUTPATIENT
Start: 2024-10-29

## 2024-10-29 NOTE — TELEPHONE ENCOUNTER
Name from pharmacy: Losartan Potassium Oral Tablet 100 MG          Will file in chart as: LOSARTAN 100 MG Oral Tab    Sig: TAKE 1 TABLET BY MOUTH IN THE EVENING    Disp: 90 tablet    Refills: 0    Start: 10/29/2024    Class: Normal    Non-formulary For: Hypertension associated with diabetes (HCC)    Last ordered: 8 months ago (2/5/2024) by Mary Pickard MD    Last refill: 8/2/2024    Rx #: [id number]    Hypertension Medications Protocol Jdcpcl13/29/2024 10:29 AM   Protocol Details Last BP reading less than 140/90    EGFRCR or GFRNAA > 50    CMP or BMP in past 12 months    In person appointment or virtual visit in the past 12 mos or appointment in next 3 mos      To be filled at: [id number] [phone], [phone]     LOV:06/28/2024  RTC:6 months   Labs:12/18/2023  Last filled:08/02/2024  Future Appointments   Date Time Provider Department Center   12/3/2024  3:30 PM Nichol Pickering MD [facility] SUB GI   12/6/2024 11:00 AM [facility]   12/16/2024 11:45 AM Jony Cooper MD [facility] Surg[facility]Onc

## 2024-11-12 ENCOUNTER — PATIENT MESSAGE (OUTPATIENT)
Dept: INTERNAL MEDICINE CLINIC | Facility: CLINIC | Age: 74
End: 2024-11-12

## 2024-11-26 ENCOUNTER — HOSPITAL ENCOUNTER (OUTPATIENT)
Age: 74
Discharge: HOME | End: 2024-11-26
Attending: EMERGENCY MEDICINE
Payer: MEDICARE

## 2024-11-26 ENCOUNTER — APPOINTMENT (OUTPATIENT)
Dept: CT IMAGING | Age: 74
End: 2024-11-26
Attending: EMERGENCY MEDICINE
Payer: MEDICARE

## 2024-11-26 VITALS
SYSTOLIC BLOOD PRESSURE: 151 MMHG | DIASTOLIC BLOOD PRESSURE: 56 MMHG | WEIGHT: 145 LBS | HEART RATE: 80 BPM | OXYGEN SATURATION: 100 % | BODY MASS INDEX: 26 KG/M2 | TEMPERATURE: 98 F | RESPIRATION RATE: 16 BRPM

## 2024-11-26 DIAGNOSIS — R10.9 RIGHT FLANK PAIN: Primary | ICD-10-CM

## 2024-11-26 DIAGNOSIS — R91.1 PULMONARY NODULE: ICD-10-CM

## 2024-11-26 DIAGNOSIS — R59.1 LYMPHADENOPATHY: ICD-10-CM

## 2024-11-26 LAB
BILIRUB UR QL STRIP: NEGATIVE
CLARITY UR: CLEAR
COLOR UR: YELLOW
GLUCOSE UR STRIP-MCNC: NEGATIVE MG/DL
HGB UR QL STRIP: NEGATIVE
KETONES UR STRIP-MCNC: NEGATIVE MG/DL
NITRITE UR QL STRIP: NEGATIVE
PH UR STRIP: 5.5 [PH]
SP GR UR STRIP: 1.02
UROBILINOGEN UR STRIP-ACNC: <2 MG/DL

## 2024-11-26 PROCEDURE — 99215 OFFICE O/P EST HI 40 MIN: CPT

## 2024-11-26 PROCEDURE — 99214 OFFICE O/P EST MOD 30 MIN: CPT

## 2024-11-26 PROCEDURE — 81002 URINALYSIS NONAUTO W/O SCOPE: CPT

## 2024-11-26 PROCEDURE — 87086 URINE CULTURE/COLONY COUNT: CPT | Performed by: EMERGENCY MEDICINE

## 2024-11-26 PROCEDURE — 74176 CT ABD & PELVIS W/O CONTRAST: CPT | Performed by: EMERGENCY MEDICINE

## 2024-11-26 RX ORDER — TRIAMCINOLONE ACETONIDE 1 MG/G
CREAM TOPICAL AS NEEDED
COMMUNITY
Start: 2024-10-03

## 2024-11-26 NOTE — ED INITIAL ASSESSMENT (HPI)
C/o 2 weeks of right mid back pain, urinary frequency and burning sensation. Urine dipstick (part of physical exam for volunteer work) at [facility] -positive for protein.
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
## 2024-11-26 NOTE — DISCHARGE INSTRUCTIONS
You may take ibuprofen 400 mg every 6 hours as needed for pain.  Wait for an hour after your morning aspirin dose to take your first dose of ibuprofen for the day.

## 2024-11-27 ENCOUNTER — NURSE ONLY (OUTPATIENT)
Dept: INTERNAL MEDICINE CLINIC | Facility: CLINIC | Age: 74
End: 2024-11-27
Payer: MEDICARE

## 2024-12-03 PROBLEM — R19.4 CHANGE IN BOWEL HABITS: Status: ACTIVE | Noted: 2024-12-03

## 2024-12-03 PROBLEM — D12.0 BENIGN NEOPLASM OF CECUM: Status: ACTIVE | Noted: 2024-12-03

## 2024-12-05 ENCOUNTER — LAB ENCOUNTER (OUTPATIENT)
Dept: LAB | Age: 74
End: 2024-12-05
Attending: INTERNAL MEDICINE
Payer: MEDICARE

## 2024-12-05 DIAGNOSIS — K75.81 NASH (NONALCOHOLIC STEATOHEPATITIS): ICD-10-CM

## 2024-12-05 DIAGNOSIS — R79.89 ABNORMAL LIVER FUNCTION TEST: ICD-10-CM

## 2024-12-05 LAB
ALBUMIN SERPL-MCNC: 4.1 G/DL (ref 3.2–4.8)
ALBUMIN/GLOB SERPL: 1.5 {RATIO} (ref 1–2)
ALP LIVER SERPL-CCNC: 126 U/L
ALT SERPL-CCNC: 21 U/L
ANION GAP SERPL CALC-SCNC: 12 MMOL/L (ref 0–18)
AST SERPL-CCNC: 27 U/L (ref ?–34)
BASOPHILS # BLD AUTO: 0.12 X10(3) UL (ref 0–0.2)
BASOPHILS NFR BLD AUTO: 1.8 %
BILIRUB SERPL-MCNC: 3.7 MG/DL (ref 0.2–1.1)
BUN BLD-MCNC: 22 MG/DL (ref 9–23)
CALCIUM BLD-MCNC: 9.8 MG/DL (ref 8.7–10.4)
CHLORIDE SERPL-SCNC: 109 MMOL/L (ref 98–112)
CO2 SERPL-SCNC: 23 MMOL/L (ref 21–32)
CREAT BLD-MCNC: 1.1 MG/DL
EGFRCR SERPLBLD CKD-EPI 2021: 54 ML/MIN/1.73M2 (ref 60–?)
EOSINOPHIL # BLD AUTO: 0.69 X10(3) UL (ref 0–0.7)
EOSINOPHIL NFR BLD AUTO: 10.3 %
ERYTHROCYTE [DISTWIDTH] IN BLOOD BY AUTOMATED COUNT: 20 %
FASTING STATUS PATIENT QL REPORTED: NO
GLOBULIN PLAS-MCNC: 2.7 G/DL (ref 2–3.5)
GLUCOSE BLD-MCNC: 145 MG/DL (ref 70–99)
HCT VFR BLD AUTO: 26.1 %
HGB BLD-MCNC: 8.8 G/DL
IMM GRANULOCYTES # BLD AUTO: 0.15 X10(3) UL (ref 0–1)
IMM GRANULOCYTES NFR BLD: 2.2 %
LYMPHOCYTES # BLD AUTO: 0.88 X10(3) UL (ref 1–4)
LYMPHOCYTES NFR BLD AUTO: 13.1 %
MCH RBC QN AUTO: 32.1 PG (ref 26–34)
MCHC RBC AUTO-ENTMCNC: 33.7 G/DL (ref 31–37)
MCV RBC AUTO: 95.3 FL
MONOCYTES # BLD AUTO: 0.41 X10(3) UL (ref 0.1–1)
MONOCYTES NFR BLD AUTO: 6.1 %
NEUTROPHILS # BLD AUTO: 4.46 X10 (3) UL (ref 1.5–7.7)
NEUTROPHILS # BLD AUTO: 4.46 X10(3) UL (ref 1.5–7.7)
NEUTROPHILS NFR BLD AUTO: 66.5 %
OSMOLALITY SERPL CALC.SUM OF ELEC: 304 MOSM/KG (ref 275–295)
PLATELET # BLD AUTO: 39 10(3)UL (ref 150–450)
POTASSIUM SERPL-SCNC: 3.7 MMOL/L (ref 3.5–5.1)
PROT SERPL-MCNC: 6.8 G/DL (ref 5.7–8.2)
RBC # BLD AUTO: 2.74 X10(6)UL
SODIUM SERPL-SCNC: 144 MMOL/L (ref 136–145)
WBC # BLD AUTO: 6.7 X10(3) UL (ref 4–11)

## 2024-12-05 PROCEDURE — 80053 COMPREHEN METABOLIC PANEL: CPT

## 2024-12-05 PROCEDURE — 85025 COMPLETE CBC W/AUTO DIFF WBC: CPT

## 2024-12-05 PROCEDURE — 36415 COLL VENOUS BLD VENIPUNCTURE: CPT

## 2024-12-13 ENCOUNTER — LAB ENCOUNTER (OUTPATIENT)
Dept: LAB | Age: 74
End: 2024-12-13
Attending: INTERNAL MEDICINE
Payer: MEDICARE

## 2024-12-13 ENCOUNTER — OFFICE VISIT (OUTPATIENT)
Dept: INTERNAL MEDICINE CLINIC | Facility: CLINIC | Age: 74
End: 2024-12-13
Payer: MEDICARE

## 2024-12-13 VITALS
OXYGEN SATURATION: 98 % | DIASTOLIC BLOOD PRESSURE: 90 MMHG | BODY MASS INDEX: 25.69 KG/M2 | RESPIRATION RATE: 16 BRPM | WEIGHT: 143.19 LBS | HEART RATE: 67 BPM | SYSTOLIC BLOOD PRESSURE: 160 MMHG | TEMPERATURE: 97 F | HEIGHT: 62.6 IN

## 2024-12-13 DIAGNOSIS — R91.1 NODULE OF LOWER LOBE OF RIGHT LUNG: ICD-10-CM

## 2024-12-13 DIAGNOSIS — R80.9 TYPE 2 DIABETES MELLITUS WITH ALBUMINURIA (HCC): Primary | ICD-10-CM

## 2024-12-13 DIAGNOSIS — E11.59 HYPERTENSION ASSOCIATED WITH DIABETES (HCC): ICD-10-CM

## 2024-12-13 DIAGNOSIS — D64.9 NORMOCYTIC ANEMIA: ICD-10-CM

## 2024-12-13 DIAGNOSIS — I15.2 HYPERTENSION ASSOCIATED WITH DIABETES (HCC): ICD-10-CM

## 2024-12-13 DIAGNOSIS — R58 BLEEDING: ICD-10-CM

## 2024-12-13 DIAGNOSIS — R80.9 TYPE 2 DIABETES MELLITUS WITH ALBUMINURIA (HCC): ICD-10-CM

## 2024-12-13 DIAGNOSIS — E78.5 HYPERLIPIDEMIA ASSOCIATED WITH TYPE 2 DIABETES MELLITUS (HCC): ICD-10-CM

## 2024-12-13 DIAGNOSIS — E11.29 TYPE 2 DIABETES MELLITUS WITH ALBUMINURIA (HCC): ICD-10-CM

## 2024-12-13 DIAGNOSIS — E11.69 HYPERLIPIDEMIA ASSOCIATED WITH TYPE 2 DIABETES MELLITUS (HCC): ICD-10-CM

## 2024-12-13 DIAGNOSIS — E11.29 TYPE 2 DIABETES MELLITUS WITH ALBUMINURIA (HCC): Primary | ICD-10-CM

## 2024-12-13 PROBLEM — R19.4 CHANGE IN BOWEL HABITS: Status: RESOLVED | Noted: 2024-12-03 | Resolved: 2024-12-13

## 2024-12-13 PROBLEM — D12.0 BENIGN NEOPLASM OF CECUM: Status: RESOLVED | Noted: 2024-12-03 | Resolved: 2024-12-13

## 2024-12-13 LAB
DEPRECATED HBV CORE AB SER IA-ACNC: 176 NG/ML
EST. AVERAGE GLUCOSE BLD GHB EST-MCNC: 74 MG/DL (ref 68–126)
FOLATE SERPL-MCNC: 5 NG/ML (ref 5.4–?)
HBA1C MFR BLD: 4.2 % (ref ?–5.7)
IRON SATN MFR SERPL: 26 %
IRON SERPL-MCNC: 76 UG/DL
T4 FREE SERPL-MCNC: 1.2 NG/DL (ref 0.8–1.7)
TOTAL IRON BINDING CAPACITY: 298 UG/DL (ref 250–425)
TRANSFERRIN SERPL-MCNC: 225 MG/DL (ref 250–380)
TSI SER-ACNC: 1.18 UIU/ML (ref 0.55–4.78)
VIT B12 SERPL-MCNC: 396 PG/ML (ref 211–911)

## 2024-12-13 PROCEDURE — 82746 ASSAY OF FOLIC ACID SERUM: CPT

## 2024-12-13 PROCEDURE — 82728 ASSAY OF FERRITIN: CPT

## 2024-12-13 PROCEDURE — 84439 ASSAY OF FREE THYROXINE: CPT

## 2024-12-13 PROCEDURE — 82607 VITAMIN B-12: CPT

## 2024-12-13 PROCEDURE — 83036 HEMOGLOBIN GLYCOSYLATED A1C: CPT

## 2024-12-13 PROCEDURE — 83550 IRON BINDING TEST: CPT

## 2024-12-13 PROCEDURE — 36415 COLL VENOUS BLD VENIPUNCTURE: CPT

## 2024-12-13 PROCEDURE — G2211 COMPLEX E/M VISIT ADD ON: HCPCS | Performed by: INTERNAL MEDICINE

## 2024-12-13 PROCEDURE — 99214 OFFICE O/P EST MOD 30 MIN: CPT | Performed by: INTERNAL MEDICINE

## 2024-12-13 PROCEDURE — 83540 ASSAY OF IRON: CPT

## 2024-12-13 PROCEDURE — 84443 ASSAY THYROID STIM HORMONE: CPT

## 2024-12-13 NOTE — PATIENT INSTRUCTIONS
Please measure your blood pressure and pulse daily and record these values. Please measure your blood pressure once daily after you have been resting for at least 5 minutes. Both feet should be flat on the ground and you should be seated with your back supported. Please communicate your blood pressures to me in 14 days.
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
## 2024-12-16 ENCOUNTER — OFFICE VISIT (OUTPATIENT)
Dept: SURGERY | Facility: CLINIC | Age: 74
End: 2024-12-16

## 2024-12-16 DIAGNOSIS — R17 SERUM TOTAL BILIRUBIN ELEVATED: Primary | ICD-10-CM

## 2024-12-27 ENCOUNTER — HOSPITAL ENCOUNTER (OUTPATIENT)
Dept: CT IMAGING | Age: 74
Discharge: HOME | End: 2024-12-27
Attending: INTERNAL MEDICINE
Payer: MEDICARE

## 2024-12-27 DIAGNOSIS — R91.1 NODULE OF LOWER LOBE OF RIGHT LUNG: ICD-10-CM

## 2024-12-27 PROCEDURE — 71250 CT THORAX DX C-: CPT | Performed by: INTERNAL MEDICINE

## 2025-01-05 ENCOUNTER — PATIENT MESSAGE (OUTPATIENT)
Dept: INTERNAL MEDICINE CLINIC | Facility: CLINIC | Age: 75
End: 2025-01-05

## 2025-01-06 ENCOUNTER — LAB ENCOUNTER (OUTPATIENT)
Dept: LAB | Age: 75
End: 2025-01-06
Attending: INTERNAL MEDICINE
Payer: MEDICARE

## 2025-01-06 DIAGNOSIS — R17 SERUM TOTAL BILIRUBIN ELEVATED: ICD-10-CM

## 2025-01-06 DIAGNOSIS — D64.9 NORMOCYTIC ANEMIA: ICD-10-CM

## 2025-01-06 DIAGNOSIS — R58 BLEEDING: ICD-10-CM

## 2025-01-06 LAB
ALBUMIN SERPL-MCNC: 4 G/DL (ref 3.2–4.8)
ALP LIVER SERPL-CCNC: 132 U/L
ALT SERPL-CCNC: 26 U/L
ANION GAP SERPL CALC-SCNC: 8 MMOL/L (ref 0–18)
APTT PPP: 28.1 SECONDS (ref 23–36)
AST SERPL-CCNC: 30 U/L (ref ?–34)
BASOPHILS # BLD AUTO: 0.12 X10(3) UL (ref 0–0.2)
BASOPHILS NFR BLD AUTO: 1.6 %
BILIRUB DIRECT SERPL-MCNC: 1 MG/DL (ref ?–0.3)
BILIRUB SERPL-MCNC: 3.3 MG/DL (ref 0.2–1.1)
BUN BLD-MCNC: 23 MG/DL (ref 9–23)
CALCIUM BLD-MCNC: 9.8 MG/DL (ref 8.7–10.4)
CHLORIDE SERPL-SCNC: 111 MMOL/L (ref 98–112)
CO2 SERPL-SCNC: 23 MMOL/L (ref 21–32)
CREAT BLD-MCNC: 1.04 MG/DL
EGFRCR SERPLBLD CKD-EPI 2021: 57 ML/MIN/1.73M2 (ref 60–?)
EOSINOPHIL # BLD AUTO: 0.68 X10(3) UL (ref 0–0.7)
EOSINOPHIL NFR BLD AUTO: 9.3 %
ERYTHROCYTE [DISTWIDTH] IN BLOOD BY AUTOMATED COUNT: 21.9 %
FASTING STATUS PATIENT QL REPORTED: YES
GLUCOSE BLD-MCNC: 162 MG/DL (ref 70–99)
HCT VFR BLD AUTO: 25 %
HGB BLD-MCNC: 8.4 G/DL
IMM GRANULOCYTES # BLD AUTO: 0.23 X10(3) UL (ref 0–1)
IMM GRANULOCYTES NFR BLD: 3.1 %
INR BLD: 0.98 (ref 0.8–1.2)
LYMPHOCYTES # BLD AUTO: 0.85 X10(3) UL (ref 1–4)
LYMPHOCYTES NFR BLD AUTO: 11.6 %
MCH RBC QN AUTO: 35.3 PG (ref 26–34)
MCHC RBC AUTO-ENTMCNC: 33.6 G/DL (ref 31–37)
MCV RBC AUTO: 105 FL
MONOCYTES # BLD AUTO: 0.4 X10(3) UL (ref 0.1–1)
MONOCYTES NFR BLD AUTO: 5.4 %
NEUTROPHILS # BLD AUTO: 5.07 X10 (3) UL (ref 1.5–7.7)
NEUTROPHILS # BLD AUTO: 5.07 X10(3) UL (ref 1.5–7.7)
NEUTROPHILS NFR BLD AUTO: 69 %
OSMOLALITY SERPL CALC.SUM OF ELEC: 301 MOSM/KG (ref 275–295)
PLATELET # BLD AUTO: 54 10(3)UL (ref 150–450)
POTASSIUM SERPL-SCNC: 4.2 MMOL/L (ref 3.5–5.1)
PROT SERPL-MCNC: 7.2 G/DL (ref 5.7–8.2)
PROTHROMBIN TIME: 13.1 SECONDS (ref 11.6–14.8)
RBC # BLD AUTO: 2.38 X10(6)UL
SODIUM SERPL-SCNC: 142 MMOL/L (ref 136–145)
WBC # BLD AUTO: 7.4 X10(3) UL (ref 4–11)

## 2025-01-06 PROCEDURE — 85730 THROMBOPLASTIN TIME PARTIAL: CPT

## 2025-01-06 PROCEDURE — 80076 HEPATIC FUNCTION PANEL: CPT

## 2025-01-06 PROCEDURE — 85025 COMPLETE CBC W/AUTO DIFF WBC: CPT

## 2025-01-06 PROCEDURE — 36415 COLL VENOUS BLD VENIPUNCTURE: CPT

## 2025-01-06 PROCEDURE — 80048 BASIC METABOLIC PNL TOTAL CA: CPT

## 2025-01-06 PROCEDURE — 85610 PROTHROMBIN TIME: CPT

## 2025-01-06 NOTE — TELEPHONE ENCOUNTER
KS: Please see MCM with patient's BP readings. Any recommendations? Also pended order for Jardiance. Please sign if you agree. TY

## 2025-01-07 ENCOUNTER — OFFICE VISIT (OUTPATIENT)
Age: 75
End: 2025-01-07
Attending: INTERNAL MEDICINE
Payer: MEDICARE

## 2025-01-07 VITALS
TEMPERATURE: 97 F | OXYGEN SATURATION: 100 % | RESPIRATION RATE: 16 BRPM | HEIGHT: 62.6 IN | BODY MASS INDEX: 25.84 KG/M2 | SYSTOLIC BLOOD PRESSURE: 146 MMHG | HEART RATE: 87 BPM | DIASTOLIC BLOOD PRESSURE: 73 MMHG | WEIGHT: 144 LBS

## 2025-01-07 DIAGNOSIS — D69.6 THROMBOCYTOPENIA (HCC): Primary | ICD-10-CM

## 2025-01-07 DIAGNOSIS — R63.4 UNINTENTIONAL WEIGHT LOSS: ICD-10-CM

## 2025-01-07 DIAGNOSIS — K90.9 INTESTINAL MALABSORPTION, UNSPECIFIED TYPE (HCC): ICD-10-CM

## 2025-01-07 DIAGNOSIS — M54.6 ACUTE MIDLINE THORACIC BACK PAIN: ICD-10-CM

## 2025-01-07 DIAGNOSIS — D64.9 ANEMIA, UNSPECIFIED TYPE: ICD-10-CM

## 2025-01-07 DIAGNOSIS — D72.810 LYMPHOCYTOPENIA: ICD-10-CM

## 2025-01-07 DIAGNOSIS — D64.9 ANEMIA, UNSPECIFIED TYPE: Primary | ICD-10-CM

## 2025-01-07 DIAGNOSIS — D64.9 NORMOCYTIC ANEMIA: ICD-10-CM

## 2025-01-07 DIAGNOSIS — D53.8 ANEMIA DUE TO ZINC DEFICIENCY: ICD-10-CM

## 2025-01-07 DIAGNOSIS — M54.50 ACUTE MIDLINE LOW BACK PAIN WITHOUT SCIATICA: ICD-10-CM

## 2025-01-07 DIAGNOSIS — D59.11 WARM AUTOIMMUNE HEMOLYTIC ANEMIA (HCC): ICD-10-CM

## 2025-01-07 DIAGNOSIS — Z87.448 HISTORY OF PROTEINURIA SYNDROME: ICD-10-CM

## 2025-01-07 DIAGNOSIS — Z13.29 SCREENING FOR THYROID DISORDER: ICD-10-CM

## 2025-01-07 DIAGNOSIS — D69.6 THROMBOCYTOPENIA (HCC): ICD-10-CM
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
## 2025-01-07 NOTE — PROGRESS NOTES
Pt here for new consult regarding normocytic anemia. Medications and medical history reviewed. Pt reports significant fatigue, SOB with exertion, dizziness, and heart palpitations. No rectal bleeding, blood in stool/urine reported. Last colonoscopy 12/2024. Pt states she had CT scans completed in 11/2024 for back pain to rule out kidney stone d/t lower back pain, however showed \"some incidental findings that I am concerned about.\" MD to review CT scans and most recent lab work. She reports unintentional weight loss of about 30 pounds over the last two years.         Education Record    Learner:  Patient    Disease / Diagnosis: normocytic anemia    Barriers / Limitations:  None   Comments:    Method:  Discussion   Comments:    General Topics:  Medication, Pain, Side effects and symptom management, and Plan of care reviewed   Comments:    Outcome:  Observed demonstration and Shows understanding   Comments:

## 2025-01-09 ENCOUNTER — TELEPHONE (OUTPATIENT)
Age: 75
End: 2025-01-09

## 2025-01-09 ENCOUNTER — HOSPITAL ENCOUNTER (OUTPATIENT)
Dept: CT IMAGING | Facility: HOSPITAL | Age: 75
Discharge: HOME | End: 2025-01-09
Attending: INTERNAL MEDICINE
Payer: MEDICARE

## 2025-01-09 DIAGNOSIS — R63.4 UNINTENTIONAL WEIGHT LOSS: ICD-10-CM

## 2025-01-09 PROCEDURE — 71260 CT THORAX DX C+: CPT | Performed by: INTERNAL MEDICINE

## 2025-01-09 PROCEDURE — 74177 CT ABD & PELVIS W/CONTRAST: CPT | Performed by: INTERNAL MEDICINE

## 2025-01-09 NOTE — TELEPHONE ENCOUNTER
I called patient to review lab results. 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 She currently denies worsening fatigue. Denies melena, hematochezia, or rectal bleeding. I prefer to hold off on steroids as this may cloud the diagnostic picture (want to complete BMBx prior to steroid administration).    Given her B symptoms and underlying monoclonal paraproteinemia (IgM), she needs to further be evaluated with bone marrow biopsy. I reviewed the indication for BMBx and she is agreeable to procedure. BMBx is scheduled for 1/16/25.     She will bring in her 24 hour Urine collection ASAP- as I do need to see if she has monoclonal protein in the urine as well (UPEP with [facility]).    MRI whole body spine is waiting to be scheduled.     She will see us 1/23/24 for follow up. Treatment recommendations to follow.     She was grateful for the call.    Beth Ojeda D.O.  [facility]

## 2025-01-10 PROCEDURE — 84156 ASSAY OF PROTEIN URINE: CPT

## 2025-01-10 PROCEDURE — 84166 PROTEIN E-PHORESIS/URINE/CSF: CPT

## 2025-01-10 PROCEDURE — 86335 IMMUNFIX E-PHORSIS/URINE/CSF: CPT

## 2025-01-11 ENCOUNTER — LAB ENCOUNTER (OUTPATIENT)
Dept: LAB | Age: 75
End: 2025-01-11
Attending: INTERNAL MEDICINE
Payer: MEDICARE

## 2025-01-11 DIAGNOSIS — Z87.448 HISTORY OF PROTEINURIA SYNDROME: ICD-10-CM

## 2025-01-11 LAB — SPECIMEN VOL UR: 1100 ML

## 2025-01-13 ENCOUNTER — PATIENT MESSAGE (OUTPATIENT)
Dept: INTERNAL MEDICINE CLINIC | Facility: CLINIC | Age: 75
End: 2025-01-13

## 2025-01-13 RX ORDER — DIPHENHYDRAMINE HCL 25 MG
50 CAPSULE ORAL AS DIRECTED
Qty: 2 CAPSULE | Refills: 0 | Status: SHIPPED | OUTPATIENT
Start: 2025-01-13

## 2025-01-13 RX ORDER — PREDNISONE 50 MG/1
50 TABLET ORAL AS DIRECTED
Qty: 3 TABLET | Refills: 0 | Status: SHIPPED | OUTPATIENT
Start: 2025-01-13

## 2025-01-14 NOTE — TELEPHONE ENCOUNTER
I am not sure what happened. She is right, it should've been sent for 90 days with 3 refills. I have pended the Rx. Please find out which pharmacy she wants us to send it to. TY

## 2025-01-16 ENCOUNTER — NURSE ONLY (OUTPATIENT)
Dept: LAB | Facility: HOSPITAL | Age: 75
End: 2025-01-16
Attending: INTERNAL MEDICINE
Payer: MEDICARE

## 2025-01-16 ENCOUNTER — HOSPITAL ENCOUNTER (OUTPATIENT)
Dept: CT IMAGING | Facility: HOSPITAL | Age: 75
Discharge: HOME | End: 2025-01-16
Attending: INTERNAL MEDICINE
Payer: MEDICARE

## 2025-01-16 VITALS
SYSTOLIC BLOOD PRESSURE: 108 MMHG | BODY MASS INDEX: 25.84 KG/M2 | RESPIRATION RATE: 16 BRPM | OXYGEN SATURATION: 100 % | DIASTOLIC BLOOD PRESSURE: 43 MMHG | HEIGHT: 62.5 IN | WEIGHT: 144 LBS | TEMPERATURE: 97 F | HEART RATE: 75 BPM

## 2025-01-16 DIAGNOSIS — D69.6 THROMBOCYTOPENIA: Primary | ICD-10-CM

## 2025-01-16 DIAGNOSIS — D59.11 HEMOLYTIC ANEMIA DUE TO WARM ANTIBODY (HCC): ICD-10-CM

## 2025-01-16 DIAGNOSIS — D64.9 NORMOCYTIC ANEMIA: ICD-10-CM

## 2025-01-16 DIAGNOSIS — D69.6 THROMBOCYTOPENIA: ICD-10-CM

## 2025-01-16 DIAGNOSIS — R63.4 UNINTENTIONAL WEIGHT LOSS: ICD-10-CM

## 2025-01-16 DIAGNOSIS — D64.9 ANEMIA: ICD-10-CM

## 2025-01-16 DIAGNOSIS — D59.11 WARM AUTOIMMUNE HEMOLYTIC ANEMIA (HCC): ICD-10-CM

## 2025-01-16 DIAGNOSIS — D59.11 HEMOLYTIC ANEMIA DUE TO WARM ANTIBODY (HCC): Primary | ICD-10-CM

## 2025-01-16 LAB
GLUCOSE BLD-MCNC: 211 MG/DL (ref 70–99)
INR BLD: 1.03 (ref 0.8–1.2)
PROTHROMBIN TIME: 13.7 SECONDS (ref 11.6–14.8)

## 2025-01-16 PROCEDURE — 81305 MYD88 GENE P.LEU265PRO VRNT: CPT | Performed by: INTERNAL MEDICINE

## 2025-01-16 PROCEDURE — 82962 GLUCOSE BLOOD TEST: CPT

## 2025-01-16 PROCEDURE — 88364 INSITU HYBRIDIZATION (FISH): CPT | Performed by: INTERNAL MEDICINE

## 2025-01-16 PROCEDURE — 88291 CYTO/MOLECULAR REPORT: CPT | Performed by: INTERNAL MEDICINE

## 2025-01-16 PROCEDURE — 36415 COLL VENOUS BLD VENIPUNCTURE: CPT

## 2025-01-16 PROCEDURE — 85097 BONE MARROW INTERPRETATION: CPT | Performed by: INTERNAL MEDICINE

## 2025-01-16 PROCEDURE — 99152 MOD SED SAME PHYS/QHP 5/>YRS: CPT

## 2025-01-16 PROCEDURE — 88184 FLOWCYTOMETRY/ TC 1 MARKER: CPT | Performed by: STUDENT IN AN ORGANIZED HEALTH CARE EDUCATION/TRAINING PROGRAM

## 2025-01-16 PROCEDURE — 88313 SPECIAL STAINS GROUP 2: CPT | Performed by: INTERNAL MEDICINE

## 2025-01-16 PROCEDURE — 88333 PATH CONSLTJ SURG CYTO XM 1: CPT | Performed by: INTERNAL MEDICINE

## 2025-01-16 PROCEDURE — 88264 CHROMOSOME ANALYSIS 20-25: CPT | Performed by: INTERNAL MEDICINE

## 2025-01-16 PROCEDURE — 88314 HISTOCHEMICAL STAINS ADD-ON: CPT | Performed by: INTERNAL MEDICINE

## 2025-01-16 PROCEDURE — 81455 SO/HL 51/>GSAP DNA/DNA&RNA: CPT | Performed by: INTERNAL MEDICINE

## 2025-01-16 PROCEDURE — 77012 CT SCAN FOR NEEDLE BIOPSY: CPT | Performed by: INTERNAL MEDICINE

## 2025-01-16 PROCEDURE — 88185 FLOWCYTOMETRY/TC ADD-ON: CPT | Performed by: STUDENT IN AN ORGANIZED HEALTH CARE EDUCATION/TRAINING PROGRAM

## 2025-01-16 PROCEDURE — 88237 TISSUE CULTURE BONE MARROW: CPT | Performed by: INTERNAL MEDICINE

## 2025-01-16 PROCEDURE — 38222 DX BONE MARROW BX & ASPIR: CPT | Performed by: INTERNAL MEDICINE

## 2025-01-16 PROCEDURE — 88342 IMHCHEM/IMCYTCHM 1ST ANTB: CPT | Performed by: INTERNAL MEDICINE

## 2025-01-16 PROCEDURE — 88365 INSITU HYBRIDIZATION (FISH): CPT | Performed by: INTERNAL MEDICINE

## 2025-01-16 PROCEDURE — 88305 TISSUE EXAM BY PATHOLOGIST: CPT | Performed by: INTERNAL MEDICINE

## 2025-01-16 PROCEDURE — 85610 PROTHROMBIN TIME: CPT

## 2025-01-16 PROCEDURE — 88341 IMHCHEM/IMCYTCHM EA ADD ANTB: CPT | Performed by: INTERNAL MEDICINE

## 2025-01-16 RX ORDER — SODIUM CHLORIDE 9 MG/ML
INJECTION, SOLUTION INTRAVENOUS CONTINUOUS
Status: DISCONTINUED | OUTPATIENT
Start: 2025-01-16 | End: 2025-01-18

## 2025-01-16 RX ORDER — NALOXONE HYDROCHLORIDE 0.4 MG/ML
0.08 INJECTION, SOLUTION INTRAMUSCULAR; INTRAVENOUS; SUBCUTANEOUS AS NEEDED
Status: DISCONTINUED | OUTPATIENT
Start: 2025-01-16 | End: 2025-01-18

## 2025-01-16 RX ORDER — MIDAZOLAM HYDROCHLORIDE 1 MG/ML
INJECTION INTRAMUSCULAR; INTRAVENOUS
Status: COMPLETED
Start: 2025-01-16 | End: 2025-01-16

## 2025-01-16 RX ORDER — MIDAZOLAM HYDROCHLORIDE 1 MG/ML
1 INJECTION INTRAMUSCULAR; INTRAVENOUS EVERY 5 MIN PRN
Status: ACTIVE | OUTPATIENT
Start: 2025-01-16 | End: 2025-01-16

## 2025-01-16 RX ORDER — NICOTINE POLACRILEX 4 MG
30 LOZENGE BUCCAL
Status: DISCONTINUED | OUTPATIENT
Start: 2025-01-16 | End: 2025-01-18

## 2025-01-16 RX ORDER — FLUMAZENIL 0.1 MG/ML
0.2 INJECTION INTRAVENOUS AS NEEDED
Status: DISCONTINUED | OUTPATIENT
Start: 2025-01-16 | End: 2025-01-18

## 2025-01-16 RX ORDER — DEXTROSE MONOHYDRATE 25 G/50ML
50 INJECTION, SOLUTION INTRAVENOUS
Status: DISCONTINUED | OUTPATIENT
Start: 2025-01-16 | End: 2025-01-18

## 2025-01-16 RX ORDER — NICOTINE POLACRILEX 4 MG
15 LOZENGE BUCCAL
Status: DISCONTINUED | OUTPATIENT
Start: 2025-01-16 | End: 2025-01-18

## 2025-01-16 RX ADMIN — MIDAZOLAM HYDROCHLORIDE 0.5 MG: 1 INJECTION INTRAMUSCULAR; INTRAVENOUS at 09:06:00

## 2025-01-16 RX ADMIN — MIDAZOLAM HYDROCHLORIDE 1 MG: 1 INJECTION INTRAMUSCULAR; INTRAVENOUS at 08:58:00

## 2025-01-16 RX ADMIN — SODIUM CHLORIDE: 9 INJECTION, SOLUTION INTRAVENOUS at 08:50:00

## 2025-01-16 NOTE — INTERVAL H&P NOTE
The above referenced H&P was reviewed by Rowdy Hussein MD on 1/16/2025, the patient was examined and no significant changes have occurred in the patient's condition since the H&P was performed.  Risks, benefits, alternative treatments and consequences of no treatment were discussed.  We will proceed with procedure as planned.  Mallampati: 2  Cardiac: Regular rate  Respiratory: Non-labored  ASA: 2      Rowdy Hussein MD  1/16/2025  8:54 AM

## 2025-01-16 NOTE — PROCEDURES
[facility]   part of [facility]  Procedure Note    Kassi Childress Patient Status:  Outpatient    12/10/1950 MRN [id number]   Location [facility] CT Attending Beth Ojeda,    Hosp Day # 0 PCP Mary Pickard MD     Procedure: Bone marrow biopsy and aspiration.    Pre-Procedure Diagnosis:  Anemia    Post-Procedure Diagnosis: Same    Anesthesia:  Local and Sedation    Findings:    Successful bone marrow aspiration and core biopsy under CT guidance from the left iliac    Specimens: Bone marrow aspirate and core    Blood Loss:  <5cc    Tourniquet Time: 0  Complications:  None  Drains:  None    Secondary Diagnosis:  None      Rowdy Hussein MD  2025

## 2025-01-16 NOTE — DISCHARGE INSTRUCTIONS
Discharge/After Visit [facility] Department of Radiology  Biopsy -  Bone Marrow  Post Sedation  Follow these guidelines:  You should be watched overnight by a responsible adult. This person should make sure your condition remains stable.   Do not drink any alcohol for 24 hours after the procedure.  Don’t drive, operate dangerous machinery, make important business or personal decisions, or sign legal documents for 24 hours after the procedure.  Note: Your healthcare provider may tell you not to take any medicine by mouth for pain or sleep for a period of time. These medicines may react with the medicine you were given during your procedure. This could cause a much stronger response than usual.     Activity:  Take it easy for the rest of the day after your biopsy.   You may be sore at the site of the biopsy for the next 5 days.   Do not do any strenuous exercises or lift over five pounds for the next 24 hours.     Biopsy Site:  Keep a bandage on the biopsy site for 24 hours after the procedure.   You may shower after 24 hours, but no soaking in a bathtub for 48 hours.     Diet: Drink plenty of fluids and resume your usual home diet. A slow return to normal foods minimizes nausea.    Pain Management:   You may use over-the-counter or prescribed pain relief medication if it is not contraindicated by another condition.     Medications:  You may resume your usual home medications. You may resume blood thinners 24 hours after the procedure if there is no bleeding from/hematoma at the puncture site or bloody urine (Renal Biopsy only)     When to seek medical advice:  Call the provider that ordered the biopsy with any questions and to discuss test results. These may also be available in your [facility] My Chart. You may also contact the Radiology Nurse at [phone], M-F, 8-5 with any additional questions or concerns.  Dial [phone] and ask the  to page the on-call IR Radiologist if  any of these occur:    A change in color or temperature of the area where the biopsy was performed.  You develop increasing pain or shortness of breath.  Unusual drowsiness, weakness, or dizziness.  Unusual vomiting.     IF YOU FEEL YOU ARE EXPERIENCING AN EMERGENCY,   CALL 911 OR GO TO THE NEAREST EMERGENCY ROOM      4.2.24 MO/  Radiology

## 2025-01-16 NOTE — IMAGING NOTE
NPO status verified  Pertinent labs and radiology imaging reviewed  Consent signed and on file    Patient tolerated procedural sedation without any immediate complications noted.  Biopsy site to left iliac crest with tegaderm dressing. C/D/I. Patient denies pain.  Report given to *** RN. Patient transported to *** accompanied by RN and transporter.

## 2025-01-20 ENCOUNTER — APPOINTMENT (OUTPATIENT)
Dept: GENERAL RADIOLOGY | Age: 75
End: 2025-01-20
Attending: INTERNAL MEDICINE
Payer: MEDICARE

## 2025-01-20 ENCOUNTER — HOSPITAL ENCOUNTER (OUTPATIENT)
Dept: MRI IMAGING | Facility: HOSPITAL | Age: 75
Discharge: HOME | End: 2025-01-20
Attending: INTERNAL MEDICINE
Payer: MEDICARE

## 2025-01-20 ENCOUNTER — HOSPITAL ENCOUNTER (OUTPATIENT)
Dept: CT IMAGING | Age: 75
End: 2025-01-20
Attending: INTERNAL MEDICINE
Payer: MEDICARE

## 2025-01-20 DIAGNOSIS — M54.6 ACUTE MIDLINE THORACIC BACK PAIN: ICD-10-CM

## 2025-01-20 DIAGNOSIS — D69.6 THROMBOCYTOPENIA: ICD-10-CM

## 2025-01-20 DIAGNOSIS — R63.4 UNINTENTIONAL WEIGHT LOSS: ICD-10-CM

## 2025-01-20 DIAGNOSIS — D64.9 NORMOCYTIC ANEMIA: ICD-10-CM

## 2025-01-20 PROCEDURE — A9575 INJ GADOTERATE MEGLUMI 0.1ML: HCPCS | Performed by: INTERNAL MEDICINE

## 2025-01-20 PROCEDURE — 76498 UNLISTED MR PROCEDURE: CPT | Performed by: INTERNAL MEDICINE

## 2025-01-20 RX ORDER — GADOTERATE MEGLUMINE 376.9 MG/ML
15 INJECTION INTRAVENOUS
Status: COMPLETED | OUTPATIENT
Start: 2025-01-20 | End: 2025-01-20

## 2025-01-20 RX ADMIN — GADOTERATE MEGLUMINE 13 ML: 376.9 INJECTION INTRAVENOUS at 09:45:00

## 2025-01-22 LAB
CD10 CELLS NFR SPEC: <1 %
CD10/CD19: <1 %
CD19 CELLS NFR SPEC: 33 %
CD19+ MM: 74 %
CD19+/CD200+: 11 %
CD19+/CD38+ MM: 74 %
CD2 CELLS NFR SPEC: 69 %
CD20 CELLS NFR SPEC: 33 %
CD200 CELLS: 13 %
CD3 CELLS NFR SPEC: 52 %
CD3+/TCRGD+: 1 %
CD3+CD4+ CELLS NFR SPEC: 32 %
CD3+CD4+ CELLS/CD3+CD8+ CLL SPEC: 2
CD3+CD8+ CELLS NFR SPEC: 16 %
CD3-/CD56+: 19 %
CD34 CELLS NFR SPEC: <1 %
CD38 CELLS NFR SPEC: 10 %
CD38+/CD19+: <1 %
CD45 CELLS NFR SPEC: 100 %
CD45-/CD38+ KAPPA: 40 %
CD45-/CD38+ LAMBDA: 15 %
CD5 CELLS NFR SPEC: 49 %
CD5/CD19 CELLS: <1 %
CD56 MM: 26 %
CD7 CELLS NFR SPEC: 67 %
CELL SURF KAPPA/LAMBDA RATIO: 9.7
CELL SURF LAMBDA LIGHT CHAIN: 3 %
CELL SURFACE KAPPA LIGHT CHAIN: 29 %
TCR G-D CELLS NFR SPEC: 1 %

## 2025-01-23 ENCOUNTER — OFFICE VISIT (OUTPATIENT)
Age: 75
End: 2025-01-23
Attending: INTERNAL MEDICINE
Payer: MEDICARE

## 2025-01-23 VITALS
DIASTOLIC BLOOD PRESSURE: 59 MMHG | HEIGHT: 62.6 IN | OXYGEN SATURATION: 100 % | SYSTOLIC BLOOD PRESSURE: 135 MMHG | HEART RATE: 86 BPM | RESPIRATION RATE: 16 BRPM | BODY MASS INDEX: 25.92 KG/M2 | WEIGHT: 144.5 LBS | TEMPERATURE: 99 F

## 2025-01-23 DIAGNOSIS — R91.1 LUNG NODULE: ICD-10-CM

## 2025-01-23 DIAGNOSIS — D59.11 WARM AUTOIMMUNE HEMOLYTIC ANEMIA (HCC): ICD-10-CM

## 2025-01-23 DIAGNOSIS — D47.2 MONOCLONAL GAMMOPATHY OF UNKNOWN SIGNIFICANCE (MGUS): Primary | ICD-10-CM

## 2025-01-23 DIAGNOSIS — D47.2 MGUS (MONOCLONAL GAMMOPATHY OF UNKNOWN SIGNIFICANCE): Primary | ICD-10-CM

## 2025-01-23 DIAGNOSIS — R06.09 DYSPNEA ON EXERTION: ICD-10-CM

## 2025-01-23 LAB
ALBUMIN SERPL-MCNC: 3.9 G/DL (ref 3.2–4.8)
ALBUMIN/GLOB SERPL: 1.3 {RATIO} (ref 1–2)
ALP LIVER SERPL-CCNC: 114 U/L
ALT SERPL-CCNC: 22 U/L
ANION GAP SERPL CALC-SCNC: 10 MMOL/L (ref 0–18)
AST SERPL-CCNC: 23 U/L (ref ?–34)
BASOPHILS # BLD AUTO: 0.06 X10(3) UL (ref 0–0.2)
BASOPHILS NFR BLD AUTO: 1 %
BILIRUB DIRECT SERPL-MCNC: 0.6 MG/DL (ref ?–0.3)
BILIRUB SERPL-MCNC: 6.2 MG/DL (ref 0.2–1.1)
BUN BLD-MCNC: 24 MG/DL (ref 9–23)
CALCIUM BLD-MCNC: 8.9 MG/DL (ref 8.7–10.6)
CHLORIDE SERPL-SCNC: 108 MMOL/L (ref 98–112)
CO2 SERPL-SCNC: 22 MMOL/L (ref 21–32)
CREAT BLD-MCNC: 1.09 MG/DL
DAT (C3D): NEGATIVE
DAT (IGG): POSITIVE
DIRECT COOMBS POLY: POSITIVE
EGFRCR SERPLBLD CKD-EPI 2021: 54 ML/MIN/1.73M2 (ref 60–?)
EOSINOPHIL # BLD AUTO: 0.27 X10(3) UL (ref 0–0.7)
EOSINOPHIL NFR BLD AUTO: 4.4 %
ERYTHROCYTE [DISTWIDTH] IN BLOOD BY AUTOMATED COUNT: 19.7 %
GLOBULIN PLAS-MCNC: 2.9 G/DL (ref 2–3.5)
GLUCOSE BLD-MCNC: 164 MG/DL (ref 70–99)
HAPTOGLOB SERPL-MCNC: <10 MG/DL (ref 30–200)
HCT VFR BLD AUTO: 22.1 %
HGB BLD-MCNC: 7.7 G/DL
HGB RETIC QN AUTO: 35.9 PG (ref 28.2–36.6)
IMM GRANULOCYTES # BLD AUTO: 0.14 X10(3) UL (ref 0–1)
IMM GRANULOCYTES NFR BLD: 2.3 %
IMM RETICS NFR: 0.29 RATIO (ref 0.1–0.3)
LDH SERPL L TO P-CCNC: 433 U/L
LYMPHOCYTES # BLD AUTO: 0.37 X10(3) UL (ref 1–4)
LYMPHOCYTES NFR BLD AUTO: 6 %
MCH RBC QN AUTO: 34.7 PG (ref 26–34)
MCHC RBC AUTO-ENTMCNC: 34.8 G/DL (ref 31–37)
MCV RBC AUTO: 99.5 FL
MONOCYTES # BLD AUTO: 0.4 X10(3) UL (ref 0.1–1)
MONOCYTES NFR BLD AUTO: 6.5 %
NEUTROPHILS # BLD AUTO: 4.92 X10 (3) UL (ref 1.5–7.7)
NEUTROPHILS # BLD AUTO: 4.92 X10(3) UL (ref 1.5–7.7)
NEUTROPHILS NFR BLD AUTO: 79.8 %
OSMOLALITY SERPL CALC.SUM OF ELEC: 298 MOSM/KG (ref 275–295)
PLATELET # BLD AUTO: 145 10(3)UL (ref 150–450)
POTASSIUM SERPL-SCNC: 3.7 MMOL/L (ref 3.5–5.1)
PROT SERPL-MCNC: 6.8 G/DL (ref 5.7–8.2)
RBC # BLD AUTO: 2.22 X10(6)UL
RETICS # AUTO: 301.3 X10(3) UL (ref 22.5–147.5)
RETICS/RBC NFR AUTO: 13.6 %
SODIUM SERPL-SCNC: 140 MMOL/L (ref 136–145)
WBC # BLD AUTO: 6.2 X10(3) UL (ref 4–11)

## 2025-01-23 RX ORDER — PREDNISONE 20 MG/1
60 TABLET ORAL DAILY
Qty: 60 TABLET | Refills: 0 | Status: SHIPPED | OUTPATIENT
Start: 2025-01-23 | End: 2025-01-23

## 2025-01-23 RX ORDER — PREDNISONE 10 MG/1
10 TABLET ORAL DAILY
Qty: 60 TABLET | Refills: 0 | Status: SHIPPED | OUTPATIENT
Start: 2025-01-23

## 2025-01-23 RX ORDER — PANTOPRAZOLE SODIUM 40 MG/1
40 TABLET, DELAYED RELEASE ORAL DAILY
Qty: 30 TABLET | Refills: 0 | Status: SHIPPED | OUTPATIENT
Start: 2025-01-23

## 2025-01-23 RX ORDER — PREDNISONE 20 MG/1
60 TABLET ORAL DAILY
Qty: 60 TABLET | Refills: 0 | Status: SHIPPED | OUTPATIENT
Start: 2025-01-23

## 2025-01-23 NOTE — PROGRESS NOTES
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  Immunohistochemistry and Special Stains:     Immunohistochemistry:     Material:  Block A1/B1  Population:  Tissue     Antibody                          Result  CD3                                   See Comment  CD20                                See Comment  CD34                                See Comment                                See Comment                                    See Comment  Cyclin D1                         See Comment           Medical Necessity    Immunohistochemical stains were performed:                  See Comment                   Positive tissue controls were utilized in the staining process.  These slides were reviewed by the signout Pathologist and showed appropriate staining results.     Interpreted by:  Juan Lucas MD     Methodology:         Immunohistochemical stains are performed on formalin-fixed, paraffin-embedded tissue sections.  Deparaffinization, antigen retrieval, and staining utilizes the automated Leica Goode III immunohistochemistry platform.  A proprietary, non-biotin, polymer-based detection system (Bond Polymer Refine DetectionTM ) is employed.  All antibodies are validated by [facility] [place] Department of Pathology to document appropriate staining  reactions.  Positive controls are utilized and show appropriate reactivity.     Special Stain(s):     Material:  Block A and Aspirate   Population:  Tissue     Stain                                Result  Iron                                    See Comment   Retic                                 See Comment                                              Positive tissue controls were utilized in the staining process.  These slides were reviewed by the signout Pathologist and showed appropriate staining results.     Interpreted by: Juan Lucas MD              Impression & Plan: Mrs. Childress is a pleasant 75 y/o F with PMHx of mildly elevated liver tests (+ smooth muscle antibody), HTN, HLD, Type 2 DM with albuminuria, basal cell carcinoma of skin of face s/p resection who presents for evaluation of anemia and thrombocytopenia. She was found to have IgM MGUS.     IgM Monoclonal Gammopathy of Undetermined Significance (IgM MGUS), High-Intermediate risk MGUS         Severe Fatigue, Peripheral Neuropathy, Postural Hypotension    Workup on 1/7/25:  Blood counts:  Hb 8.4, hematocrit 25, plt 54 k, , absolute lymphocytes 850   Citrated platelet count 79.2. confirms true thrombocytopenia with plt < 150k  , uric acid 5.9  SPEP with PARIS: kappa free light chain 6.589, lambda normal 2.1, kappa/lambda ratio 3.02, beta globulins 1.25. Monoclonal IgM kappa seen (monoclonal spike in beta region)   QI: IgA 81.2, IgG 914, IgM 917.2 (elevated)  Beta-2 microglobulin: 5.97  Cryoglobulins: none detected  PNH panel: no evidence of PNH  KWESI IFA Screen: Negative  HbSAg negative, HCV Ab nonreactive, Hep B surface Ab reactive,  HIV negative, Hep B Core Ab nonreactive  CT C/A/P with stable small RUL pulmonary nodule 5 mm, nodularity on liver (6 x 5 mm) sable from last time, and nabeel hepatis lymph node 18 x 11 mm decreased in size from previous, portal caval lymph node 25 x 14 mm (stable since previous study), aortocaval lymph node 5  x 5 mm stable  BMBx 1/16/25   Small monotypic lymphoplasmacytic infiltrate, consistent with IgM monoclonal gammopathy of undetermined significance.  Background hypercellular bone marrow with multilineage hematopoiesis,  erythroid predominance, and mild megaloblastoid change.        Plan:  - I spoke to our pathologist Dr. Lucas in detail regarding this patient's Bone marrow biopsy results. She has < 5 % plasma cells in the bone marrow, excluding the diagnosis of multiple myeloma at this time. She does have IgM MGUS with hypercellular bone marrow (50% cellular). Differential consists of LPL, WM, non-secretory myeloma, evolving low-grade MDS.  MYD88 pending, to rule out lymphoplasmacytic lymphoma (LPL) /Waldenstroms macroglobulinemia (WM)  FISH MDS panel pending, to rule out evolving low-grade MDS  - We will send out pathology slides to [facility] for review.  - Given her bilateral hand neuropathy, orthostatic and postural hypotension, and new dyspnea on exertion -- amyloidosis is also on the differential. She has no monoclonal protein in the urine. Will obtain following diagnostic studies:  NT Pro-BNP, troponin, urine albumin (next visit)  2D echo cardiogram (look for global longitudinal strain)  EKG (look for Low voltage QRS)  I messaged Dr. Lucas to see if he can add Congo Red Stain (to evaluate for amyloidosis)  PET-CT ordered. Will need biopsy of any area with abnormal (increased uptake).      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 Will assess if steroids help improve them.   - Risk stratification: Given that she has non-IgG MGUS and abnormal serum free light chain ratio, she has high-intermediate risk MGUS. Will follow her labs (SPEP with PARIS, QI, kappa/dilcia ratio) every 3 months.        Warm Autoimmune Hemolytic Anemia          Thrombocytopenia  - hemolysis labs 1/7/25: reticulocyte % 16.5, retic absolute 405 (very elevated),  (elevated), Blood bank Antibody screen positive with Warm IgG antibodies. 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 This is concerning for possible underlying lymphoproliferative disorder    Plan:  - Start prednisone 1 mg/kg (70 mg PO daily)  - Start pantoprazole 40 mg daily for GI stress ulcer prophylaxis  - Repeat labs in 1 week.         Follow-Up: 1/30/25 for labs (ordered) and visit with me.     Beth Ojeda D.O.  [facility]       Time spent on day of encounter:  Precharting, Face to face & orders  = 40 minutes  Documentation and coordination of care = 120 minutes  TOTAL = 160 minutes

## 2025-01-23 NOTE — PATIENT INSTRUCTIONS
Enrique Solitario,    It was nice seeing you!. Your hemoglobin is low because your blood cells are breaking apart (autoimmune hemolytic anemia). Please do the followin) Take prednisone 70 mg daily (in the morning, with food)  2) Take protonix 40 mg once daily - to protect your stomach lining  3) Get your PET-CT scan and ultrasound of heart done  4) We will send the pathology slides to [facility] for review.      Please come get labs at 10 am on 25. I will see you afterwards.     Thank you,    Beth Ojeda D.O.  [facility]

## 2025-01-23 NOTE — PROGRESS NOTES
Pt here for f/u, here to discuss pathology results. Pt c/o continued fatigue, SOB with exertion, and dizziness with positional changes. She does report numbness/tingling to her fingertips. No headaches reported. No family history of Waldenstrom's or Lymphoma.           Education Record    Learner:  Patient and Spouse    Disease / Diagnosis: discuss pathology    Barriers / Limitations:  None   Comments:    Method:  Discussion   Comments:    General Topics:  Medication, Pain, Side effects and symptom management, and Plan of care reviewed   Comments:    Outcome:  Observed demonstration and Shows understanding   Comments:

## 2025-01-24 LAB
CELLS ANALYZED LEUK/LYM: 20
CELLS COUNTED LEUK/LYM: 20
CELLS KARYOTYPED LEUK/LYM: 2
GTG BAND LEUK/LYM: 400

## 2025-01-27 ENCOUNTER — NURSE NAVIGATOR ENCOUNTER (OUTPATIENT)
Age: 75
End: 2025-01-27

## 2025-01-27 NOTE — PROGRESS NOTES
Fax sent to [facility] ([phone]) to send bone marrow slides from CT guided biopsy on 1/16/2025 to [facility] ([place], [place]). Spoke with Magy in pathology regarding fax as well - pathology to send slides.

## 2025-01-28 ENCOUNTER — EKG ENCOUNTER (OUTPATIENT)
Dept: LAB | Age: 75
End: 2025-01-28
Attending: INTERNAL MEDICINE
Payer: MEDICARE

## 2025-01-28 ENCOUNTER — HOSPITAL ENCOUNTER (OUTPATIENT)
Dept: CV DIAGNOSTICS | Age: 75
Discharge: HOME | End: 2025-01-28
Attending: INTERNAL MEDICINE
Payer: MEDICARE

## 2025-01-28 ENCOUNTER — LAB ENCOUNTER (OUTPATIENT)
Dept: LAB | Age: 75
End: 2025-01-28
Attending: INTERNAL MEDICINE
Payer: MEDICARE

## 2025-01-28 DIAGNOSIS — D47.2 MGUS (MONOCLONAL GAMMOPATHY OF UNKNOWN SIGNIFICANCE): ICD-10-CM

## 2025-01-28 DIAGNOSIS — R06.09 DYSPNEA ON EXERTION: ICD-10-CM

## 2025-01-28 DIAGNOSIS — D47.2 MONOCLONAL GAMMOPATHY OF UNKNOWN SIGNIFICANCE (MGUS): ICD-10-CM

## 2025-01-28 DIAGNOSIS — D59.11 WARM AUTOIMMUNE HEMOLYTIC ANEMIA (HCC): ICD-10-CM

## 2025-01-28 LAB
ALBUMIN SERPL-MCNC: 4.3 G/DL (ref 3.2–4.8)
ALBUMIN/GLOB SERPL: 1.9 {RATIO} (ref 1–2)
ALP LIVER SERPL-CCNC: 83 U/L
ALT SERPL-CCNC: 21 U/L
ANION GAP SERPL CALC-SCNC: 11 MMOL/L (ref 0–18)
AST SERPL-CCNC: 13 U/L (ref ?–34)
ATRIAL RATE: 67 BPM
BASOPHILS # BLD AUTO: 0.02 X10(3) UL (ref 0–0.2)
BASOPHILS NFR BLD AUTO: 0.2 %
BILIRUB DIRECT SERPL-MCNC: 0.9 MG/DL (ref ?–0.3)
BILIRUB SERPL-MCNC: 2.6 MG/DL (ref 0.2–1.1)
BUN BLD-MCNC: 38 MG/DL (ref 9–23)
CALCIUM BLD-MCNC: 9.7 MG/DL (ref 8.7–10.6)
CHLORIDE SERPL-SCNC: 107 MMOL/L (ref 98–112)
CO2 SERPL-SCNC: 23 MMOL/L (ref 21–32)
CREAT BLD-MCNC: 1.41 MG/DL
CREAT UR-SCNC: 57.7 MG/DL
EGFRCR SERPLBLD CKD-EPI 2021: 40 ML/MIN/1.73M2 (ref 60–?)
EOSINOPHIL # BLD AUTO: 0 X10(3) UL (ref 0–0.7)
EOSINOPHIL NFR BLD AUTO: 0 %
ERYTHROCYTE [DISTWIDTH] IN BLOOD BY AUTOMATED COUNT: 18.7 %
ESTRADIOL SERPL-MCNC: 19.8 PG/ML
FASTING STATUS PATIENT QL REPORTED: YES
GLOBULIN PLAS-MCNC: 2.3 G/DL (ref 2–3.5)
GLUCOSE BLD-MCNC: 286 MG/DL (ref 70–99)
HAPTOGLOB SERPL-MCNC: <10 MG/DL (ref 30–200)
HCT VFR BLD AUTO: 24.1 %
HGB BLD-MCNC: 8 G/DL
HGB RETIC QN AUTO: 35.1 PG (ref 28.2–36.6)
IGA SERPL-MCNC: 59.2 MG/DL (ref 40–350)
IGM SERPL-MCNC: 536.4 MG/DL (ref 50–300)
IMM GRANULOCYTES # BLD AUTO: 0.47 X10(3) UL (ref 0–1)
IMM GRANULOCYTES NFR BLD: 4.1 %
IMM RETICS NFR: 0.28 RATIO (ref 0.1–0.3)
IMMUNOGLOBULIN PNL SER-MCNC: 656 MG/DL (ref 650–1600)
LDH SERPL L TO P-CCNC: 343 U/L
LYMPHOCYTES # BLD AUTO: 0.77 X10(3) UL (ref 1–4)
LYMPHOCYTES NFR BLD AUTO: 6.7 %
MCH RBC QN AUTO: 34.9 PG (ref 26–34)
MCHC RBC AUTO-ENTMCNC: 33.2 G/DL (ref 31–37)
MCV RBC AUTO: 105.2 FL
MICROALBUMIN UR-MCNC: 0.4 MG/DL
MICROALBUMIN/CREAT 24H UR-RTO: 6.9 UG/MG (ref ?–30)
MONOCYTES # BLD AUTO: 0.95 X10(3) UL (ref 0.1–1)
MONOCYTES NFR BLD AUTO: 8.2 %
NEUTROPHILS # BLD AUTO: 9.32 X10 (3) UL (ref 1.5–7.7)
NEUTROPHILS # BLD AUTO: 9.32 X10(3) UL (ref 1.5–7.7)
NEUTROPHILS NFR BLD AUTO: 80.8 %
NT-PROBNP SERPL-MCNC: 1677 PG/ML (ref ?–125)
OSMOLALITY SERPL CALC.SUM OF ELEC: 311 MOSM/KG (ref 275–295)
P AXIS: 66 DEGREES
P-R INTERVAL: 192 MS
PLATELET # BLD AUTO: 312 10(3)UL (ref 150–450)
POTASSIUM SERPL-SCNC: 3.9 MMOL/L (ref 3.5–5.1)
PROT SERPL-MCNC: 6.6 G/DL (ref 5.7–8.2)
Q-T INTERVAL: 394 MS
QRS DURATION: 72 MS
QTC CALCULATION (BEZET): 416 MS
R AXIS: 9 DEGREES
RBC # BLD AUTO: 2.29 X10(6)UL
RETICS # AUTO: 270.5 X10(3) UL (ref 22.5–147.5)
RETICS/RBC NFR AUTO: 11.3 %
SODIUM SERPL-SCNC: 141 MMOL/L (ref 136–145)
T AXIS: 40 DEGREES
TESTOST SERPL-MCNC: <7 NG/DL
TROPONIN I SERPL HS-MCNC: <3 NG/L
VENTRICULAR RATE: 67 BPM
WBC # BLD AUTO: 11.5 X10(3) UL (ref 4–11)

## 2025-01-28 PROCEDURE — 86334 IMMUNOFIX E-PHORESIS SERUM: CPT

## 2025-01-28 PROCEDURE — 36415 COLL VENOUS BLD VENIPUNCTURE: CPT

## 2025-01-28 PROCEDURE — 82570 ASSAY OF URINE CREATININE: CPT

## 2025-01-28 PROCEDURE — 83010 ASSAY OF HAPTOGLOBIN QUANT: CPT

## 2025-01-28 PROCEDURE — 84165 PROTEIN E-PHORESIS SERUM: CPT

## 2025-01-28 PROCEDURE — 82784 ASSAY IGA/IGD/IGG/IGM EACH: CPT

## 2025-01-28 PROCEDURE — 85025 COMPLETE CBC W/AUTO DIFF WBC: CPT

## 2025-01-28 PROCEDURE — 82043 UR ALBUMIN QUANTITATIVE: CPT

## 2025-01-28 PROCEDURE — 80053 COMPREHEN METABOLIC PANEL: CPT

## 2025-01-28 PROCEDURE — 83880 ASSAY OF NATRIURETIC PEPTIDE: CPT

## 2025-01-28 PROCEDURE — 93307 TTE W/O DOPPLER COMPLETE: CPT | Performed by: INTERNAL MEDICINE

## 2025-01-28 PROCEDURE — 93005 ELECTROCARDIOGRAM TRACING: CPT

## 2025-01-28 PROCEDURE — 83521 IG LIGHT CHAINS FREE EACH: CPT

## 2025-01-28 PROCEDURE — 82248 BILIRUBIN DIRECT: CPT

## 2025-01-28 PROCEDURE — 84484 ASSAY OF TROPONIN QUANT: CPT

## 2025-01-28 PROCEDURE — 82670 ASSAY OF TOTAL ESTRADIOL: CPT

## 2025-01-28 PROCEDURE — 84403 ASSAY OF TOTAL TESTOSTERONE: CPT

## 2025-01-28 PROCEDURE — 93010 ELECTROCARDIOGRAM REPORT: CPT | Performed by: INTERNAL MEDICINE

## 2025-01-28 PROCEDURE — 85045 AUTOMATED RETICULOCYTE COUNT: CPT

## 2025-01-28 PROCEDURE — 83615 LACTATE (LD) (LDH) ENZYME: CPT

## 2025-01-29 ENCOUNTER — HOSPITAL ENCOUNTER (OUTPATIENT)
Dept: NUCLEAR MEDICINE | Facility: HOSPITAL | Age: 75
Discharge: HOME | End: 2025-01-29
Attending: INTERNAL MEDICINE
Payer: MEDICARE

## 2025-01-29 ENCOUNTER — HOSPITAL ENCOUNTER (OUTPATIENT)
Dept: NUCLEAR MEDICINE | Facility: HOSPITAL | Age: 75
End: 2025-01-29
Attending: INTERNAL MEDICINE
Payer: MEDICARE

## 2025-01-29 DIAGNOSIS — D47.2 MGUS (MONOCLONAL GAMMOPATHY OF UNKNOWN SIGNIFICANCE): ICD-10-CM

## 2025-01-29 DIAGNOSIS — D59.11 WARM AUTOIMMUNE HEMOLYTIC ANEMIA (HCC): ICD-10-CM

## 2025-01-29 DIAGNOSIS — D59.11 WARM AUTOIMMUNE HEMOLYTIC ANEMIA (HCC): Primary | ICD-10-CM

## 2025-01-29 LAB
GLUCOSE BLD-MCNC: 214 MG/DL (ref 70–99)
GLUCOSE BLD-MCNC: 220 MG/DL (ref 70–99)

## 2025-01-29 PROCEDURE — 82962 GLUCOSE BLOOD TEST: CPT

## 2025-01-30 ENCOUNTER — APPOINTMENT (OUTPATIENT)
Age: 75
End: 2025-01-30
Attending: INTERNAL MEDICINE
Payer: MEDICARE

## 2025-01-30 ENCOUNTER — OFFICE VISIT (OUTPATIENT)
Age: 75
End: 2025-01-30
Attending: INTERNAL MEDICINE
Payer: MEDICARE

## 2025-01-30 VITALS
WEIGHT: 140 LBS | RESPIRATION RATE: 16 BRPM | DIASTOLIC BLOOD PRESSURE: 72 MMHG | SYSTOLIC BLOOD PRESSURE: 146 MMHG | HEART RATE: 83 BPM | BODY MASS INDEX: 25.12 KG/M2 | TEMPERATURE: 98 F | HEIGHT: 62.6 IN | OXYGEN SATURATION: 100 %

## 2025-01-30 DIAGNOSIS — N17.9 AKI (ACUTE KIDNEY INJURY): ICD-10-CM

## 2025-01-30 DIAGNOSIS — Z86.39 HISTORY OF DIABETES MELLITUS, TYPE II: ICD-10-CM

## 2025-01-30 DIAGNOSIS — D59.11 WARM AUTOIMMUNE HEMOLYTIC ANEMIA (HCC): Primary | ICD-10-CM

## 2025-01-30 DIAGNOSIS — G62.9 NEUROPATHY: ICD-10-CM

## 2025-01-30 LAB
ALBUMIN SERPL ELPH-MCNC: 4.27 G/DL (ref 3.75–5.21)
ALBUMIN SERPL-MCNC: 4 G/DL (ref 3.2–4.8)
ALBUMIN/GLOB SERPL: 1.5 {RATIO} (ref 1–2)
ALBUMIN/GLOB SERPL: 1.83 {RATIO} (ref 1–2)
ALP LIVER SERPL-CCNC: 78 U/L
ALPHA1 GLOB SERPL ELPH-MCNC: 0.32 G/DL (ref 0.19–0.46)
ALPHA2 GLOB SERPL ELPH-MCNC: 0.47 G/DL (ref 0.48–1.05)
ALT SERPL-CCNC: 21 U/L
ANION GAP SERPL CALC-SCNC: 7 MMOL/L (ref 0–18)
AST SERPL-CCNC: 12 U/L (ref ?–34)
B-GLOBULIN SERPL ELPH-MCNC: 0.94 G/DL (ref 0.68–1.23)
BASOPHILS # BLD AUTO: 0.03 X10(3) UL (ref 0–0.2)
BASOPHILS NFR BLD AUTO: 0.3 %
BILIRUB SERPL-MCNC: 4.1 MG/DL (ref 0.2–1.1)
BUN BLD-MCNC: 38 MG/DL (ref 9–23)
CALCIUM BLD-MCNC: 9.3 MG/DL (ref 8.7–10.6)
CHLORIDE SERPL-SCNC: 107 MMOL/L (ref 98–112)
CO2 SERPL-SCNC: 21 MMOL/L (ref 21–32)
CREAT BLD-MCNC: 1.52 MG/DL
EGFRCR SERPLBLD CKD-EPI 2021: 36 ML/MIN/1.73M2 (ref 60–?)
EOSINOPHIL # BLD AUTO: 0.02 X10(3) UL (ref 0–0.7)
EOSINOPHIL NFR BLD AUTO: 0.2 %
ERYTHROCYTE [DISTWIDTH] IN BLOOD BY AUTOMATED COUNT: 18.6 %
GAMMA GLOB SERPL ELPH-MCNC: 0.6 G/DL (ref 0.62–1.7)
GLOBULIN PLAS-MCNC: 2.6 G/DL (ref 2–3.5)
GLUCOSE BLD-MCNC: 353 MG/DL (ref 70–99)
HCT VFR BLD AUTO: 27.8 %
HGB BLD-MCNC: 9.3 G/DL
IMM GRANULOCYTES # BLD AUTO: 0.5 X10(3) UL (ref 0–1)
IMM GRANULOCYTES NFR BLD: 5 %
KAPPA LC FREE SER-MCNC: 2.75 MG/DL (ref 0.33–1.94)
KAPPA LC FREE/LAMBDA FREE SER NEPH: 3.25 {RATIO} (ref 0.26–1.65)
LAMBDA LC FREE SERPL-MCNC: 0.85 MG/DL (ref 0.57–2.63)
LYMPHOCYTES # BLD AUTO: 0.85 X10(3) UL (ref 1–4)
LYMPHOCYTES NFR BLD AUTO: 8.5 %
MCH RBC QN AUTO: 33.9 PG (ref 26–34)
MCHC RBC AUTO-ENTMCNC: 33.5 G/DL (ref 31–37)
MCV RBC AUTO: 101.5 FL
MONOCYTES # BLD AUTO: 0.94 X10(3) UL (ref 0.1–1)
MONOCYTES NFR BLD AUTO: 9.4 %
NEUTROPHILS # BLD AUTO: 7.7 X10 (3) UL (ref 1.5–7.7)
NEUTROPHILS # BLD AUTO: 7.7 X10(3) UL (ref 1.5–7.7)
NEUTROPHILS NFR BLD AUTO: 76.6 %
OSMOLALITY SERPL CALC.SUM OF ELEC: 303 MOSM/KG (ref 275–295)
PLATELET # BLD AUTO: 356 10(3)UL (ref 150–450)
POTASSIUM SERPL-SCNC: 3.8 MMOL/L (ref 3.5–5.1)
PROT SERPL-MCNC: 6.6 G/DL (ref 5.7–8.2)
PROT SERPL-MCNC: 6.6 G/DL (ref 5.7–8.2)
RBC # BLD AUTO: 2.74 X10(6)UL
SODIUM SERPL-SCNC: 135 MMOL/L (ref 136–145)
WBC # BLD AUTO: 10 X10(3) UL (ref 4–11)
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
## 2025-01-30 NOTE — PROGRESS NOTES
Pt here for f/u. Pt was unable to complete PET scan yesterday due to blood sugar levels. Pt has been compliant with Prednisone 70mg daily + Pantoprazole. Pt states \"I just feel like crap.\" Pt reports jitteriness and unable to focus her eyes. She is concerned about her kidney function. Pt is tearful, emotional support provided. Patient has an appointment with [facility] on 2/10/2025.         Education Record    Learner:  Patient and Spouse    Disease / Diagnosis: autoimmune hemolytic anemia    Barriers / Limitations:  None   Comments:    Method:  Discussion   Comments:    General Topics:  Medication, Pain, Side effects and symptom management, and Plan of care reviewed   Comments:    Outcome:  Observed demonstration and Shows understanding   Comments:

## 2025-01-31 ENCOUNTER — HOSPITAL ENCOUNTER (EMERGENCY)
Age: 75
Discharge: HOME | End: 2025-02-01
Attending: EMERGENCY MEDICINE
Payer: MEDICARE

## 2025-01-31 DIAGNOSIS — D59.11 WARM AUTOIMMUNE HEMOLYTIC ANEMIA (HCC): ICD-10-CM

## 2025-01-31 DIAGNOSIS — R73.9 HYPERGLYCEMIA: Primary | ICD-10-CM

## 2025-01-31 LAB
ALBUMIN SERPL-MCNC: 4.4 G/DL (ref 3.2–4.8)
ALBUMIN/GLOB SERPL: 1.6 {RATIO} (ref 1–2)
ALP LIVER SERPL-CCNC: 85 U/L
ALT SERPL-CCNC: 17 U/L
ANION GAP SERPL CALC-SCNC: 11 MMOL/L (ref 0–18)
AST SERPL-CCNC: 12 U/L (ref ?–34)
BASE EXCESS BLD CALC-SCNC: -3 MMOL/L
BASOPHILS # BLD AUTO: 0.01 X10(3) UL (ref 0–0.2)
BASOPHILS NFR BLD AUTO: 0.1 %
BILIRUB SERPL-MCNC: 3 MG/DL (ref 0.2–1.1)
BUN BLD-MCNC: 31 MG/DL (ref 9–23)
BUN BLD-MCNC: 37 MG/DL (ref 7–18)
CALCIUM BLD-MCNC: 9.4 MG/DL (ref 8.7–10.6)
CHLORIDE BLD-SCNC: 106 MMOL/L (ref 98–112)
CHLORIDE SERPL-SCNC: 104 MMOL/L (ref 98–112)
CO2 BLD-SCNC: 19 MMOL/L (ref 21–32)
CO2 BLD-SCNC: 23 MMOL/L (ref 22–32)
CO2 SERPL-SCNC: 20 MMOL/L (ref 21–32)
CREAT BLD-MCNC: 1.3 MG/DL
CREAT BLD-MCNC: 1.31 MG/DL
EGFRCR SERPLBLD CKD-EPI 2021: 43 ML/MIN/1.73M2 (ref 60–?)
EGFRCR SERPLBLD CKD-EPI 2021: 44 ML/MIN/1.73M2 (ref 60–?)
EOSINOPHIL # BLD AUTO: 0 X10(3) UL (ref 0–0.7)
EOSINOPHIL NFR BLD AUTO: 0 %
ERYTHROCYTE [DISTWIDTH] IN BLOOD BY AUTOMATED COUNT: 17.8 %
GLOBULIN PLAS-MCNC: 2.7 G/DL (ref 2–3.5)
GLUCOSE BLD-MCNC: 234 MG/DL (ref 70–99)
GLUCOSE BLD-MCNC: 325 MG/DL (ref 70–99)
GLUCOSE BLD-MCNC: 408 MG/DL (ref 70–99)
GLUCOSE BLD-MCNC: 413 MG/DL (ref 70–99)
GLUCOSE BLD-MCNC: 445 MG/DL (ref 70–99)
HCO3 BLD-SCNC: 22.3 MEQ/L
HCT VFR BLD AUTO: 29.1 %
HCT VFR BLD CALC: 27 %
HGB BLD-MCNC: 10.1 G/DL
IMM GRANULOCYTES # BLD AUTO: 0.41 X10(3) UL (ref 0–1)
IMM GRANULOCYTES NFR BLD: 4.5 %
ISTAT IONIZED CALCIUM FOR CHEM 8: 1.19 MMOL/L (ref 1.12–1.32)
LYMPHOCYTES # BLD AUTO: 0.2 X10(3) UL (ref 1–4)
LYMPHOCYTES NFR BLD AUTO: 2.2 %
MCH RBC QN AUTO: 34.2 PG (ref 26–34)
MCHC RBC AUTO-ENTMCNC: 34.7 G/DL (ref 31–37)
MCV RBC AUTO: 98.6 FL
MONOCYTES # BLD AUTO: 0.18 X10(3) UL (ref 0.1–1)
MONOCYTES NFR BLD AUTO: 2 %
NEUTROPHILS # BLD AUTO: 8.37 X10 (3) UL (ref 1.5–7.7)
NEUTROPHILS # BLD AUTO: 8.37 X10(3) UL (ref 1.5–7.7)
NEUTROPHILS NFR BLD AUTO: 91.2 %
OSMOLALITY SERPL CALC.SUM OF ELEC: 304 MOSM/KG (ref 275–295)
PCO2 BLD: 37.3 MMHG
PH BLD: 7.39 [PH]
PLATELET # BLD AUTO: 320 10(3)UL (ref 150–450)
PO2 BLD: 21 MMHG
POTASSIUM BLD-SCNC: 4.4 MMOL/L (ref 3.6–5.1)
POTASSIUM SERPL-SCNC: 4.5 MMOL/L (ref 3.5–5.1)
PROT SERPL-MCNC: 7.1 G/DL (ref 5.7–8.2)
RBC # BLD AUTO: 2.95 X10(6)UL
SAO2 % BLD: 34 %
SODIUM BLD-SCNC: 135 MMOL/L (ref 136–145)
SODIUM SERPL-SCNC: 135 MMOL/L (ref 136–145)
WBC # BLD AUTO: 9.2 X10(3) UL (ref 4–11)

## 2025-01-31 PROCEDURE — 93005 ELECTROCARDIOGRAM TRACING: CPT

## 2025-01-31 PROCEDURE — 93010 ELECTROCARDIOGRAM REPORT: CPT

## 2025-01-31 PROCEDURE — 85810 BLOOD VISCOSITY EXAMINATION: CPT | Performed by: INTERNAL MEDICINE

## 2025-01-31 PROCEDURE — 85025 COMPLETE CBC W/AUTO DIFF WBC: CPT | Performed by: EMERGENCY MEDICINE

## 2025-01-31 PROCEDURE — 82962 GLUCOSE BLOOD TEST: CPT

## 2025-01-31 PROCEDURE — 82803 BLOOD GASES ANY COMBINATION: CPT

## 2025-01-31 PROCEDURE — 96361 HYDRATE IV INFUSION ADD-ON: CPT

## 2025-01-31 PROCEDURE — 99285 EMERGENCY DEPT VISIT HI MDM: CPT

## 2025-01-31 PROCEDURE — 80047 BASIC METABLC PNL IONIZED CA: CPT

## 2025-01-31 PROCEDURE — 96360 HYDRATION IV INFUSION INIT: CPT

## 2025-01-31 PROCEDURE — 80053 COMPREHEN METABOLIC PANEL: CPT | Performed by: EMERGENCY MEDICINE

## 2025-01-31 PROCEDURE — 85025 COMPLETE CBC W/AUTO DIFF WBC: CPT

## 2025-01-31 PROCEDURE — 80053 COMPREHEN METABOLIC PANEL: CPT

## 2025-01-31 RX ORDER — INSULIN ASPART 100 [IU]/ML
0.2 INJECTION, SOLUTION INTRAVENOUS; SUBCUTANEOUS ONCE
Status: COMPLETED | OUTPATIENT
Start: 2025-01-31 | End: 2025-01-31

## 2025-01-31 RX ORDER — DEXTROSE MONOHYDRATE AND SODIUM CHLORIDE 5; .45 G/100ML; G/100ML
INJECTION, SOLUTION INTRAVENOUS CONTINUOUS
OUTPATIENT
Start: 2025-01-31 | End: 2025-02-01

## 2025-02-01 VITALS
BODY MASS INDEX: 24.8 KG/M2 | HEART RATE: 72 BPM | RESPIRATION RATE: 19 BRPM | OXYGEN SATURATION: 100 % | HEIGHT: 63 IN | TEMPERATURE: 99 F | WEIGHT: 140 LBS | DIASTOLIC BLOOD PRESSURE: 63 MMHG | SYSTOLIC BLOOD PRESSURE: 143 MMHG

## 2025-02-01 LAB
GLUCOSE BLD-MCNC: 111 MG/DL (ref 70–99)
GLUCOSE BLD-MCNC: 126 MG/DL (ref 70–99)
GLUCOSE BLD-MCNC: 138 MG/DL (ref 70–99)
GLUCOSE BLD-MCNC: 187 MG/DL (ref 70–99)

## 2025-02-01 PROCEDURE — 82962 GLUCOSE BLOOD TEST: CPT

## 2025-02-01 RX ORDER — INSULIN GLARGINE 100 [IU]/ML
INJECTION, SOLUTION SUBCUTANEOUS
Qty: 10 ML | Refills: 0 | Status: SHIPPED | OUTPATIENT
Start: 2025-02-01 | End: 2025-02-03

## 2025-02-01 NOTE — ED INITIAL ASSESSMENT (HPI)
Patient states that she was started on high dose prednisone due to possible unknown cancer. Patient states that her blood sugar was >600 tonight. Patient complains of fatigue and generalized weakness. Patient ambulatory to triage. Patient is Type II diabetic.
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
## 2025-02-01 NOTE — DISCHARGE INSTRUCTIONS
Check blood glucose four times daily, once fasting in the morning and then post-prandial 2 hrs after   She should follow-up with [facility] vs. PCP in the next week and they can adjust insulin regimen from there   Sliding scale is calculated as : (whatever the BG is recorded at - 140 ) / 30 to get total units to give yourself           
Pt is 48 y/o female with Pmhx of DM, DKA, depression, etoh abuse, alcoholic pancreatitis, etoh withdrawal here for eval of abd pain, nausea and vomiting today am. as per pt she usually drinks about 2 pints of vodka daily, with the last 2 days ago, today with epigastric pain, similar to the past pancreatitis attacks, also multiple episodes of NB/NB emesis, hasn't taken her insulin since yesterday, FS as per EMS ; pt denies fever, chills, denies urinary sx, LMP - 2 yrs ago (pt is postmenopausal).

## 2025-02-02 NOTE — PROGRESS NOTES
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 mild megaloblastoid changes.  Myeloid:  CELLULARITY: Adequate to slightly increased  MORPHOLOGY: The myeloid series shows progressive maturation.  Blasts are not increased  Megakaryocyte:  CELLULARITY: Adequate  MORPHOLOGY: The majority of megakaryocytes are unremarkable.  Rare small hypolobated megakaryocytes are noted  Lymphocytes: Lymphocytes are small and mature appearing.  There are small well-circumscribed nonparatrabecular lymphoid aggregates composed of small mature appearing lymphocytes.  Plasma Cells: Plasma cells are not significantly increased.  The plasma cells are small with condensed chromatin.  Based on immunohistochemistry, the plasma cells comprise less than 5% of bone marrow cellularity.  Sheets of plasma cells are not identified.  Bony Trabeculae: Unremarkable  Immunohistochemistry and Special Stains:     Immunohistochemistry:     Material:  Block A1/B1  Population:  Tissue     Antibody                          Result  CD3                                   See Comment  CD20                                See Comment  CD34                                See Comment                                See Comment                                    See Comment  Cyclin D1                          See Comment           Medical Necessity    Immunohistochemical stains were performed:                  See Comment                   Positive tissue controls were utilized in the staining process.  These slides were reviewed by the signout Pathologist and showed appropriate staining results.     Interpreted by:  Juan Lucas MD     Methodology:         Immunohistochemical stains are performed on formalin-fixed, paraffin-embedded tissue sections.  Deparaffinization, antigen retrieval, and staining utilizes the automated Leica Goode III immunohistochemistry platform.  A proprietary, non-biotin, polymer-based detection system (Bond Polymer Refine DetectionTM ) is employed.  All antibodies are validated by [facility] Department of Pathology to document appropriate staining reactions.  Positive controls are utilized and show appropriate reactivity.     Special Stain(s):     Material:  Block A and Aspirate   Population:  Tissue     Stain                                Result  Iron                                    See Comment   Retic                                 See Comment                                              Positive tissue controls were utilized in the staining process.  These slides were reviewed by the signout Pathologist and showed appropriate staining results.     Interpreted by: Juan Lucas MD              Impression & Plan: Mrs. Childress is a 75 y/o F with PMHx of mildly elevated liver tests (+ smooth muscle antibody), HTN, HLD, Type 2 DM with albuminuria, basal cell carcinoma of skin of face s/p resection who presents for evaluation of anemia and thrombocytopenia. She was found to have IgM MGUS.     IgM Monoclonal Gammopathy of Undetermined Significance (IgM MGUS), High-Intermediate risk MGUS    Clonal Cytopenia of Undetermined Significance (CCUS) diagnosed 1/29/25 -- given anemia, thrombocytopenia, and TET2 mutation    Severe Fatigue, Peripheral Neuropathy, Postural  Hypotension    Differential diagnosis: Lymphoplasmacytic lymphoma, Waldenstrom's macroglobulinemia, non-secretory myeloma, evolving low-grade MDS.    Workup:  Labs 1/7/25   Blood counts:  Hb 8.4, hematocrit 25, plt 54 k, , absolute lymphocytes 850   Citrated platelet count 79.2. confirms true thrombocytopenia with plt < 150k  , uric acid 5.9  SPEP with PARIS: kappa free light chain 6.589, lambda normal 2.1, kappa/lambda ratio 3.02, beta globulins 1.25. Monoclonal IgM kappa seen (monoclonal spike in beta region)   QI: IgA 81.2, IgG 914, IgM 917.2 (elevated)  Beta-2 microglobulin: 5.97  Cryoglobulins: none detected  PNH panel: no evidence of PNH  KWESI IFA Screen: Negative  HbSAg negative, HCV Ab nonreactive, Hep B surface Ab reactive,  HIV negative, Hep B Core Ab nonreactive  CT C/A/P with stable small RUL pulmonary nodule 5 mm, nodularity on liver (6 x 5 mm) sable from last time, and nabeel hepatis lymph node 18 x 11 mm decreased in size from previous, portal caval lymph node 25 x 14 mm (stable since previous study), aortocaval lymph node 5 x 5 mm stable    BMBx 1/16/25   Small monotypic lymphoplasmacytic infiltrate, consistent with IgM monoclonal gammopathy of undetermined significance.  Background hypercellular bone marrow with multilineage hematopoiesis,  erythroid predominance, and mild megaloblastoid change.    Cytogenetics: normal karyotype  Special stains: negative for congo red stain.     1/23/25: I spoke to our pathologist Dr. Lucas in detail regarding this patient's Bone marrow biopsy results. She has < 5 % plasma cells in the bone marrow, excluding the diagnosis of multiple myeloma at this time. She does have IgM MGUS with hypercellular bone marrow (50% cellular).  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 Gly1 361Arg, Frequency  24%    1/23/25: Started prednisone 70 mg PO every day along with daily pantoprazole for stress ulcer prophylaxis.     1/28/25 Labs  SPEP with monoclonal spike in beta region. Monoclonal IgM kappa. Kappa free light chaine 2.752 , lambda 0.848, k/l ratio 3.25 (up from 3.02)  IgM decreased to 536.4 after initiating prednisone  Hemolysis labs improving (reticulocyte, LDH, haptoglobin)  proBNP elevated at 1677  High sensitivity troponin <3  EKG with ,  (mildly low)  2D echo: EF 65-70%, no mention of abnormal global longitudinal strain  TSH, prolcatin, estradiol, testosterone are normal -- making POEMS less likely given lack of endocrinopathy. Her fasting glucose is high because she is on a steroid course      TODAY 1/30/25  - Patient now has CCUS as documented above, given she has clear evidence of anemia, thrombocytopenia, and TET-2 mutation with frequency 24% (> 2%). This may be a sign of low-grade evolving MDS. It's unclear if this is a separate process from her high-intermediate risk IgM MGUS.   - Of note, the patient's IgM level did decrease and her thrombocytopenia has completely resolved after I started steroids, which makes me suspicious of an underlying lymphoproliferative disorder. LPL and WM are on the differential.   - We have sent out her pathology slides for review at [facility], where she has an appointment the week of 2/4/25.  - Given her persistent bilateral finger neuropathy, I have referred to neurology for EMG.  - I reviewed her further lab results above. I am not sure why her proBNP is so elevated in the absence of heart failure. She does not have any signs of organomegaly. Troponin, EKG, Echo are normal. Less likely amyloidosis  - PET-CT scan pending. She will need tissue biopsy if there is any abnormal uptake/mass  - Serum hyperviscosity has been ordered, results pending  - SPEP with PARIS, QI, kappa/lambda ratio every 3 months.          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 Concerning for underlying lymphoproliferative disorder   - hemolysis labs 1/7/25: reticulocyte % 16.5, retic absolute 405 (very elevated),  (elevated), Blood bank Antibody screen positive with Warm IgG antibodies. ALMA positive for poly & IgG (+4); negative for C3D, Tbili 3.3 with indirect bili 2.2, haptoglobin < 10  - labs 1/23/25: , tbili 6.2 , indirect bilirubin 5.6, haptoglobin < 10, Hb 7.7 g/dL, hematocrit 22.1, plt 145 (improved from 54), retic 13.6% and absolute retic 301, ALMA Poly positive, IgG positive, C3D Negative  - 1/23/25: started prednisone 70 mg daily along with GI stress ulcer prophylaxis    Plan:  - Labs today show significantly improved Hb at 9.3 g/dL, up from 7.7 g/dL with just 7 days of steroids  - TAPER steroids as follows:  1/31 - 2/6: prednisone 60 mg daily  2/7 - 2/13: prednisone 50 mg daily  2/14- 2/20: prednisone 40 mg daily  2/21- 2/27: prednisone 30 mg daily  2/28 - 3/6: prednisone 20 mg daily  3/7 - 3/13: prednisone 10 mg daily  3/14 - 3/20: prednisone 7.5 mg daily  3/21 - 3/27: prednisone 5.0 mg daily  3/28 - 4/3: prednisone 2.5 mg daily  4/4/2025: OFF steroids. Can discontinue PPI if no GERD or GI symptoms    - referral to endocrinology given hyperglycemia secondary to steroids; we will need endocrinology recommendations given she is on a long-taper of steroids. I appreciate endocrinology's assistance in this patient's care.       Follow-Up: Labs on 2/3/25. Return to clinic 2/6/25 for repeat labs and visit with me.       Beth Ojeda D.O.  [facility]       Time spent on this encounter:  Pre-charting/reviewing medical records:  20 minutes  In room with patient obtaining history, performing exam, counseling on diagnosis, and reviewing plan: 30 minutes  Orders/notes:  90 minutes  Total time: 140 minutes

## 2025-02-03 ENCOUNTER — MOBILE ENCOUNTER (OUTPATIENT)
Dept: INTERNAL MEDICINE CLINIC | Facility: CLINIC | Age: 75
End: 2025-02-03

## 2025-02-03 ENCOUNTER — PATIENT OUTREACH (OUTPATIENT)
Dept: CASE MANAGEMENT | Age: 75
End: 2025-02-03

## 2025-02-03 ENCOUNTER — LAB ENCOUNTER (OUTPATIENT)
Dept: LAB | Age: 75
End: 2025-02-03
Attending: INTERNAL MEDICINE
Payer: MEDICARE

## 2025-02-03 ENCOUNTER — OFFICE VISIT (OUTPATIENT)
Dept: NEPHROLOGY | Facility: CLINIC | Age: 75
End: 2025-02-03
Payer: MEDICARE

## 2025-02-03 ENCOUNTER — TELEPHONE (OUTPATIENT)
Dept: INTERNAL MEDICINE CLINIC | Facility: CLINIC | Age: 75
End: 2025-02-03

## 2025-02-03 VITALS — DIASTOLIC BLOOD PRESSURE: 62 MMHG | SYSTOLIC BLOOD PRESSURE: 132 MMHG | BODY MASS INDEX: 25 KG/M2 | WEIGHT: 140 LBS

## 2025-02-03 DIAGNOSIS — E11.29 TYPE 2 DIABETES MELLITUS WITH ALBUMINURIA (HCC): ICD-10-CM

## 2025-02-03 DIAGNOSIS — E11.59 HYPERTENSION ASSOCIATED WITH DIABETES (HCC): ICD-10-CM

## 2025-02-03 DIAGNOSIS — D59.11 WARM AUTOIMMUNE HEMOLYTIC ANEMIA (HCC): ICD-10-CM

## 2025-02-03 DIAGNOSIS — N18.31 STAGE 3A CHRONIC KIDNEY DISEASE (HCC): ICD-10-CM

## 2025-02-03 DIAGNOSIS — R80.9 TYPE 2 DIABETES MELLITUS WITH ALBUMINURIA (HCC): ICD-10-CM

## 2025-02-03 DIAGNOSIS — Z86.39 HISTORY OF DIABETES MELLITUS, TYPE II: ICD-10-CM

## 2025-02-03 DIAGNOSIS — I15.2 HYPERTENSION ASSOCIATED WITH DIABETES (HCC): ICD-10-CM

## 2025-02-03 DIAGNOSIS — R80.9 TYPE 2 DIABETES MELLITUS WITH ALBUMINURIA (HCC): Primary | ICD-10-CM

## 2025-02-03 DIAGNOSIS — E11.29 TYPE 2 DIABETES MELLITUS WITH ALBUMINURIA (HCC): Primary | ICD-10-CM

## 2025-02-03 LAB
ALBUMIN SERPL-MCNC: 4.3 G/DL (ref 3.2–4.8)
ALBUMIN/GLOB SERPL: 1.7 {RATIO} (ref 1–2)
ALP LIVER SERPL-CCNC: 65 U/L
ALT SERPL-CCNC: 16 U/L
ANION GAP SERPL CALC-SCNC: 12 MMOL/L (ref 0–18)
AST SERPL-CCNC: 11 U/L (ref ?–34)
ATRIAL RATE: 74 BPM
BASOPHILS # BLD AUTO: 0.02 X10(3) UL (ref 0–0.2)
BASOPHILS NFR BLD AUTO: 0.2 %
BILIRUB DIRECT SERPL-MCNC: 1 MG/DL (ref ?–0.3)
BILIRUB SERPL-MCNC: 2.9 MG/DL (ref 0.2–1.1)
BILIRUB UR QL STRIP.AUTO: NEGATIVE
BUN BLD-MCNC: 36 MG/DL (ref 9–23)
CALCIUM BLD-MCNC: 9.7 MG/DL (ref 8.7–10.6)
CHLORIDE SERPL-SCNC: 104 MMOL/L (ref 98–112)
CLARITY UR REFRACT.AUTO: CLEAR
CO2 SERPL-SCNC: 23 MMOL/L (ref 21–32)
CREAT BLD-MCNC: 1.05 MG/DL
CREAT UR-SCNC: 52.6 MG/DL
CREAT UR-SCNC: 52.6 MG/DL
EGFRCR SERPLBLD CKD-EPI 2021: 56 ML/MIN/1.73M2 (ref 60–?)
EOSINOPHIL # BLD AUTO: 0 X10(3) UL (ref 0–0.7)
EOSINOPHIL NFR BLD AUTO: 0 %
ERYTHROCYTE [DISTWIDTH] IN BLOOD BY AUTOMATED COUNT: 16.5 %
EST. AVERAGE GLUCOSE BLD GHB EST-MCNC: 71 MG/DL (ref 68–126)
FASTING STATUS PATIENT QL REPORTED: NO
GLOBULIN PLAS-MCNC: 2.5 G/DL (ref 2–3.5)
GLUCOSE BLD-MCNC: 171 MG/DL (ref 70–99)
GLUCOSE UR STRIP.AUTO-MCNC: >1000 MG/DL
HAPTOGLOB SERPL-MCNC: 38 MG/DL (ref 30–200)
HBA1C MFR BLD: 4.1 % (ref ?–5.7)
HCT VFR BLD AUTO: 30.4 %
HGB BLD-MCNC: 10.5 G/DL
HGB RETIC QN AUTO: 37.2 PG (ref 28.2–36.6)
IMM GRANULOCYTES # BLD AUTO: 0.24 X10(3) UL (ref 0–1)
IMM GRANULOCYTES NFR BLD: 1.8 %
IMM RETICS NFR: 0.08 RATIO (ref 0.1–0.3)
LDH SERPL L TO P-CCNC: 230 U/L
LEUKOCYTE ESTERASE UR QL STRIP.AUTO: NEGATIVE
LYMPHOCYTES # BLD AUTO: 0.44 X10(3) UL (ref 1–4)
LYMPHOCYTES NFR BLD AUTO: 3.4 %
MCH RBC QN AUTO: 33.9 PG (ref 26–34)
MCHC RBC AUTO-ENTMCNC: 34.5 G/DL (ref 31–37)
MCV RBC AUTO: 98.1 FL
MICROALBUMIN UR-MCNC: 0.5 MG/DL
MICROALBUMIN/CREAT 24H UR-RTO: 9.5 UG/MG (ref ?–30)
MONOCYTES # BLD AUTO: 0.96 X10(3) UL (ref 0.1–1)
MONOCYTES NFR BLD AUTO: 7.3 %
NEUTROPHILS # BLD AUTO: 11.46 X10 (3) UL (ref 1.5–7.7)
NEUTROPHILS # BLD AUTO: 11.46 X10(3) UL (ref 1.5–7.7)
NEUTROPHILS NFR BLD AUTO: 87.3 %
NITRITE UR QL STRIP.AUTO: NEGATIVE
OSMOLALITY SERPL CALC.SUM OF ELEC: 300 MOSM/KG (ref 275–295)
P AXIS: 81 DEGREES
P-R INTERVAL: 166 MS
PH UR STRIP.AUTO: 5 [PH] (ref 5–8)
PLATELET # BLD AUTO: 305 10(3)UL (ref 150–450)
POTASSIUM SERPL-SCNC: 4 MMOL/L (ref 3.5–5.1)
PROT SERPL-MCNC: 6.8 G/DL (ref 5.7–8.2)
PROT UR STRIP.AUTO-MCNC: NEGATIVE MG/DL
PROT UR-MCNC: 9.1 MG/DL (ref ?–14)
PROT/CREAT UR-RTO: 0.17
Q-T INTERVAL: 382 MS
QRS DURATION: 78 MS
QTC CALCULATION (BEZET): 424 MS
R AXIS: 3 DEGREES
RBC # BLD AUTO: 3.1 X10(6)UL
RBC UR QL AUTO: NEGATIVE
RETICS # AUTO: 252.3 X10(3) UL (ref 22.5–147.5)
RETICS/RBC NFR AUTO: 8.1 %
SODIUM SERPL-SCNC: 139 MMOL/L (ref 136–145)
SP GR UR STRIP.AUTO: >1.03 (ref 1–1.03)
T AXIS: 37 DEGREES
UROBILINOGEN UR STRIP.AUTO-MCNC: NORMAL MG/DL
VENTRICULAR RATE: 74 BPM
WBC # BLD AUTO: 13.1 X10(3) UL (ref 4–11)

## 2025-02-03 PROCEDURE — 85045 AUTOMATED RETICULOCYTE COUNT: CPT

## 2025-02-03 PROCEDURE — 36415 COLL VENOUS BLD VENIPUNCTURE: CPT

## 2025-02-03 PROCEDURE — 83036 HEMOGLOBIN GLYCOSYLATED A1C: CPT

## 2025-02-03 PROCEDURE — 82570 ASSAY OF URINE CREATININE: CPT

## 2025-02-03 PROCEDURE — 81003 URINALYSIS AUTO W/O SCOPE: CPT

## 2025-02-03 PROCEDURE — 82043 UR ALBUMIN QUANTITATIVE: CPT

## 2025-02-03 PROCEDURE — 85025 COMPLETE CBC W/AUTO DIFF WBC: CPT

## 2025-02-03 PROCEDURE — 83010 ASSAY OF HAPTOGLOBIN QUANT: CPT

## 2025-02-03 PROCEDURE — 84156 ASSAY OF PROTEIN URINE: CPT

## 2025-02-03 PROCEDURE — 83615 LACTATE (LD) (LDH) ENZYME: CPT

## 2025-02-03 PROCEDURE — 80053 COMPREHEN METABOLIC PANEL: CPT

## 2025-02-03 PROCEDURE — 82248 BILIRUBIN DIRECT: CPT

## 2025-02-03 NOTE — PROGRESS NOTES
Transitions of Care Navigation  Discharge Date: 25  Contact Date: 2/3/2025    Transitions of Care Assessment:  BROOK Initial Assessment    General:  Assessment completed with: Patient  Patient Subjective: Spoke with patient who reports her blood sugars have been slowly getting better since her ED visit. She reports she continues to feel dizzy and shaky. She states this sensation has continued since being home from the ER but not any worse. The patient denies any abdominal pain, nausea, vomiting. She took her blood sugar today and it was 177-- had a half glass of milk. Fasting glucose 169. Yesterday she was at 352. She does report her blood sugars have continued to be high and wants to know if there will be any further adjustments and wants to ask Dr. Pickard. She is taking Prednisone and is aware that this causes her blood sugar to be higher.The patient provided me further blood sugar readings:  : fasting 217, 2 hrs after a meal 296, 284, 463. 2/2- fasting 166, 2 hrs after a meal 195, 352, 246 . 2/3- fasting 169 . She is following up with the Endocrinologist .She denies any new or worsening symptoms at this time.  Chief Complaint: Hyperglycemia  Warm autoimmune hemolytic anemia (HCC)  Verify patient name and  with patient/ caregiver: Yes    Hospital Stay/Discharge:  Tell me what you understand of why you were in the hospital or emergency department: My blood sugars were high  Prior to leaving the hospital were your Discharge Instructions reviewed with you?: Yes  Did you receive a copy of your written Discharge Instructions?: Yes  What questions do you have about your Discharge Instructions?: patient denies  Do you feel better or worse since you left the hospital or emergency department?: Better    Follow - Up Appointment:  Do you have a follow-up appointment?: Yes  Date: 25  Physician: Dr. Atkinson ([facility])  Are there any barriers to getting to your follow-up appointment?: No    Home  Health/DME:  Prior to leaving the hospital was Home Health (HH) arranged for you?: N/A  Are HH needs identified by staff during the assessment?: No     Prior to leaving the hospital or emergency department was Durable Medical Equipment (DME), medical supplies, or infusions arranged for you?: N/A  Are DME/medical supply/infusions needs identified by staff during this assessment?: No     Medications/Diet:  Did any of your medications change, during or after your hospital stay or ED visit?: Yes  Do you have your new or updated medications?: Yes  Do you understand what your medications are for and possible side effects?: Yes  Are there any reasons that keep you from taking your medication as prescribed?: No  Any concerns about medication refills?: No    Were you given a different diet per your Discharge Instructions?: Yes  Diet Type: Diabetic  Reason: T1DM  Are there any barriers to following that diet?: No     Questions/Concerns:  Do you have any questions or concerns that have not been discussed?: Yes         Nursing Interventions:    Endocrinology follow up scheduled for 02/07/2025.   Hem/Onc appointment scheduled for 02/06/2025.   [facility] recommended a visit at the [facility] for as soon as tomorrow 02/04-- patient declined.   [facility] offered appointment with Dr. Pickard and patient declined-- she states she feels she has too many appointments to get to right now.   The patient is asking for further recommendations on her blood sugar readings and her recent EKG report. The patient sent a [email or web address] message to Dr. Pickard regarding the blood sugar readings. A message has been sent to the PCP regarding the EKG results. The patient is aware she will be contacted directly.   All d/c instructions reviewed with pt.  Reviewed when to call MD vs when to go to ER/call 911.  Educated pt on the importance of taking all meds as prescribed as well as close f/u with PCP/specialists.  Pt verbalized understanding and will contact  office with any further questions or concerns.       Medications:  Medication Reconciliation:  I am aware of an inpatient discharge within the last 30 days.  The discharge medication list has been reconciled with the patient's current medication list and reviewed by me. See medication list for additions of new medication, and changes to current doses of medications and discontinued medications.  Current Outpatient Medications   Medication Sig Dispense Refill    insulin glargine 100 UNIT/ML Subcutaneous Solution 12 units nightly 100 mL 0    insulin aspart 100 Units/mL Subcutaneous Solution Pen-injector Aspart sliding scale 1 unit for every 30 units over 140 1 each 2    pantoprazole 40 MG Oral Tab EC Take 1 tablet (40 mg total) by mouth daily. 30 tablet 0    PREDNISONE 20 MG Oral Tab Take 3 tablets (60 mg total) by mouth daily. Take three tablets daily 60 tablet 0    empagliflozin (JARDIANCE) 25 MG Oral Tab Take 1 tablet (25 mg total) by mouth daily. 90 tablet 3    Cyanocobalamin (VITAMIN B 12 OR) Take by mouth daily.      FOLIC ACID OR Take by mouth daily.      triamcinolone 0.1 % External Cream Apply topically as needed.      losartan 100 MG Oral Tab Take 1 tablet (100 mg total) by mouth every evening. 90 tablet 3    atorvastatin 20 MG Oral Tab Take 1 tablet (20 mg total) by mouth daily. 90 tablet 3    Glucose Blood (ONETOUCH VERIO) In Vitro Strip E11.69 use to check sugars twice daily 100 strip 11    Mometasone Furoate 0.1 % External Cream Apply to AA BID x 2 weeks, then PRN 45 g 2    cetirizine 10 MG Oral Tab Take 1 tablet (10 mg total) by mouth daily.           Follow-up Appointments:  Your appointments       Date & Time Appointment Department (Center)    Feb 06, 2025 10:30 AM CST HEMATOLOGY ONCOLOGY FOLLOW UP with Beth Ojeda DO [facility] [place] ([facility])        Feb 07, 2025 8:00 AM CST Exam - New Patient with Sudarshan Atkinson MD [facility]  [facility] ([facility])        Feb 24, 2025 10:30 AM CST Adult Physical with Mary Pickard MD [facility] ([facility])    PLEASE NOTE - Most insurances allow a Complete Physical once every 366 days. Please schedule accordingly.    Please arrive 15 minutes prior to your scheduled appointment. Please also bring your Insurance card, Photo ID, and your medication bottles or a list of your current medication.    If you no longer require this appointment, please contact your physician office to cancel.              [address]  [facility] [address]  [phone] [facility]  [address]  [facility] [id number] [phone] [facility] Hematology Oncology [facility]  [address]  [facility] [address]  [facility] [address]  [phone]            [facility]  Was TCC Ordered: No      Primary Care Provider (If no TCC appointment)  Does patient already have a PCP appointment scheduled? No  Nurse Care Manager Attempted to schedule PCP office ER Follow-up appointment with patient   -If no appointment scheduled: Explain  patient declined-- feels she has already too many appointments. Will see Endocrinology as scheduled on 02/07.     Specialist  Does the patient have any other follow-up appointment(s) need to be scheduled? No   -If yes: Nurse Care Manager reviewed upcoming specialist appointments with patient: No   -Does the patient need assistance scheduling appointment(s): No    [x]  Patient verbally agrees to additional follow-up calls from Nurse Care Manager    Book By Date: 02/08/2025

## 2025-02-03 NOTE — TELEPHONE ENCOUNTER
Spoke to patient for Transitions of Care call today.     The patient had an EKG completed on 01/31/2025 during her ED visit. The patient is asking for Dr. Pickard to look over the EKG result and provide any recommendations as it was abnormal compared to the EKG completed on 01/28.   The patient had further blood sugar readings provided to me during the call. She also sent a [employer] message with her blood sugar readings.   Per [employer] message:   Blood sugars for last 3 days….. fasting and 3 -post meal  2/1  217, 296,284, 463  2/2  166,195,352,246  2/3  169  Stay on current plan of 1 unit for every 30pts. over 140 or wait until Friday to see endocrinologist?       The patient is scheduled with Endocrinology on 02/07/2025.     Clinical staff: Please update Dr. Pickard with the following information. Please call patient with further recommendations. Thank you!     Future Appointments   Date Time Provider Department Center   2/6/2025 10:30 AM Beth Ojeda DO NP [facility]   2/7/2025  8:00 AM Sudarshan Atkinson MD [id number] [place]   2/24/2025 10:30 AM Mary Pickard MD [facility] [place]

## 2025-02-04 NOTE — PROGRESS NOTES
Patient called in regards to her sugar.  She recently started insulin and 2 days ago her sugar went up to 460s and she took 11 units and it brought down to 300.  Today her blood sugars over 560.  For her sliding scale she can take up to 14 units and she is wondering if it is okay to take.  She also takes long-acting insulin which is about 12 units.  Her fasting sugar has been around 160s to 200s.  Recommended to take 14 units and check her sugars in 2 hours.  If still high then okay to take long-acting.  Keep orange juice or glucose tabs in hand.      Follow up call  Called the patient to see how she is doing. Fasting sugar is 105. Discussed to take 3 units of short acting insulin with meals then check sugars 2 hours after eating and adjust as necessary. Keep glucose tabs in hand and she has an appointment with endocrinology coming up.     Vanessa Mccormick DO

## 2025-02-05 LAB — VISCOSITY: 1.7 REL.SALINE

## 2025-02-06 ENCOUNTER — OFFICE VISIT (OUTPATIENT)
Age: 75
End: 2025-02-06
Attending: INTERNAL MEDICINE
Payer: MEDICARE

## 2025-02-06 VITALS
DIASTOLIC BLOOD PRESSURE: 71 MMHG | HEIGHT: 62.6 IN | SYSTOLIC BLOOD PRESSURE: 156 MMHG | RESPIRATION RATE: 16 BRPM | WEIGHT: 140 LBS | OXYGEN SATURATION: 100 % | TEMPERATURE: 97 F | BODY MASS INDEX: 25.12 KG/M2 | HEART RATE: 81 BPM

## 2025-02-06 DIAGNOSIS — D47.2 IGM MONOCLONAL GAMMOPATHY OF UNCERTAIN SIGNIFICANCE: Primary | ICD-10-CM

## 2025-02-06 DIAGNOSIS — D59.11 WARM AUTOIMMUNE HEMOLYTIC ANEMIA (HCC): ICD-10-CM

## 2025-02-06 LAB
BASOPHILS # BLD AUTO: 0 X10(3) UL (ref 0–0.2)
BASOPHILS NFR BLD AUTO: 0 %
BILIRUB DIRECT SERPL-MCNC: 0.9 MG/DL (ref ?–0.3)
EOSINOPHIL # BLD AUTO: 0.02 X10(3) UL (ref 0–0.7)
EOSINOPHIL NFR BLD AUTO: 0.2 %
ERYTHROCYTE [DISTWIDTH] IN BLOOD BY AUTOMATED COUNT: 15.4 %
HAPTOGLOB SERPL-MCNC: 53 MG/DL (ref 30–200)
HCT VFR BLD AUTO: 31.7 %
HGB BLD-MCNC: 10.7 G/DL
HGB RETIC QN AUTO: 35.2 PG (ref 28.2–36.6)
IMM GRANULOCYTES # BLD AUTO: 0.16 X10(3) UL (ref 0–1)
IMM GRANULOCYTES NFR BLD: 1.7 %
IMM RETICS NFR: 0.06 RATIO (ref 0.1–0.3)
LDH SERPL L TO P-CCNC: 200 U/L
LYMPHOCYTES # BLD AUTO: 0.55 X10(3) UL (ref 1–4)
LYMPHOCYTES NFR BLD AUTO: 5.8 %
MCH RBC QN AUTO: 33.3 PG (ref 26–34)
MCHC RBC AUTO-ENTMCNC: 33.8 G/DL (ref 31–37)
MCV RBC AUTO: 98.8 FL
MONOCYTES # BLD AUTO: 1.03 X10(3) UL (ref 0.1–1)
MONOCYTES NFR BLD AUTO: 10.9 %
NEUTROPHILS # BLD AUTO: 7.65 X10 (3) UL (ref 1.5–7.7)
NEUTROPHILS # BLD AUTO: 7.65 X10(3) UL (ref 1.5–7.7)
NEUTROPHILS NFR BLD AUTO: 81.4 %
PLATELET # BLD AUTO: 258 10(3)UL (ref 150–450)
RBC # BLD AUTO: 3.21 X10(6)UL
RETICS # AUTO: 104 X10(3) UL (ref 22.5–147.5)
RETICS/RBC NFR AUTO: 3.4 %
WBC # BLD AUTO: 9.4 X10(3) UL (ref 4–11)

## 2025-02-06 RX ORDER — PEN NEEDLE, DIABETIC 32GX 5/32"
1 NEEDLE, DISPOSABLE MISCELLANEOUS AS DIRECTED
COMMUNITY
Start: 2025-02-01 | End: 2025-02-07

## 2025-02-06 RX ORDER — SYRING-NEEDL,DISP,INSUL,0.3 ML 31 GX5/16"
1 SYRINGE, EMPTY DISPOSABLE MISCELLANEOUS DAILY
COMMUNITY
Start: 2025-02-01

## 2025-02-06 RX ORDER — PREDNISONE 20 MG/1
60 TABLET ORAL DAILY
Qty: 60 TABLET | Refills: 3 | Status: SHIPPED | OUTPATIENT
Start: 2025-02-06

## 2025-02-06 NOTE — PROGRESS NOTES
Pt here for f/u. Pt presented to ER on 1/31 for hyperglycemia - she was sent home with insulin. Pt states her blood sugars have improved, she has an appt with endocrinology tomorrow for further management. She is compliant with her Predisone 60mg, requesting refill for 20mg tablets. She is seeing [facility] on Sunday 2/9/2025. Pt states she has been shaky, unsteady at times, and has new swelling to bilateral ankles. She states at times she feels like her \"heart is fluttering.\" Inquiring about next steps.       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

## 2025-02-06 NOTE — PROGRESS NOTES
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 evaluation.  The core biopsy consists of trabecular bone and adequate bone marrow.  Aspirate Differential (Percent of 500-cell count):      Granulocytes 41   Blasts <1   Normoblasts 52   Lymphocytes 5   Monocytes <1   Eosinophils 2   Basophils <1   Plasma Cells <1      Touch Preps: The touch preparations contain myeloid, erythroid and megakaryocytic elements  Clot: The particle clot section consists of cellular particles with myeloid erythroid and megakaryocytic elements  Cellularity:  ASPIRATE: Active  BIOPSY %: The core biopsy is hypercellular for age with an estimated cellularity of 50%.  Erythroid:  CELLULARITY: Increased  MORPHOLOGY: The erythroid series shows mild megaloblastoid changes.  Myeloid:  CELLULARITY: Adequate to slightly increased  MORPHOLOGY: The myeloid series shows progressive maturation.  Blasts are not increased  Megakaryocyte:  CELLULARITY: Adequate  MORPHOLOGY: The majority of megakaryocytes are unremarkable.  Rare small hypolobated megakaryocytes are noted  Lymphocytes: Lymphocytes are small and mature appearing.  There are small well-circumscribed nonparatrabecular lymphoid aggregates composed of small mature appearing lymphocytes.  Plasma Cells: Plasma cells are not  significantly increased.  The plasma cells are small with condensed chromatin.  Based on immunohistochemistry, the plasma cells comprise less than 5% of bone marrow cellularity.  Sheets of plasma cells are not identified.  Bony Trabeculae: Unremarkable  Immunohistochemistry and Special Stains:     Immunohistochemistry:     Material:  Block A1/B1  Population:  Tissue     Antibody                          Result  CD3                                   See Comment  CD20                                See Comment  CD34                                See Comment                                See Comment                                    See Comment  Cyclin D1                         See Comment           Medical Necessity    Immunohistochemical stains were performed:                  See Comment                   Positive tissue controls were utilized in the staining process.  These slides were reviewed by the signout Pathologist and showed appropriate staining results.     Interpreted by:  Juan Lucas MD     Methodology:         Immunohistochemical stains are performed on formalin-fixed, paraffin-embedded tissue sections.  Deparaffinization, antigen retrieval, and staining utilizes the automated Leica Goode III immunohistochemistry platform.  A proprietary, non-biotin, polymer-based detection system (Bond Polymer Refine DetectionTM ) is employed.  All antibodies are validated by [facility] Department of Pathology to document appropriate staining reactions.  Positive controls are utilized and show appropriate reactivity.     Special Stain(s):     Material:  Block A and Aspirate   Population:  Tissue     Stain                                Result  Iron                                    See Comment   Retic                                 See Comment                                              Positive tissue controls were utilized in the staining process.  These slides were reviewed by the signout  Pathologist and showed appropriate staining results.     Interpreted by: Juan Lucas MD            Impression & Plan: Mrs. Childress is a 75 y/o F with PMHx of mildly elevated liver tests (+ smooth muscle antibody), HTN, HLD, Type 2 DM with albuminuria, basal cell carcinoma of skin of face s/p resection who presents for evaluation of anemia and thrombocytopenia. She was found to have IgM MGUS.     IgM Monoclonal Gammopathy of Undetermined Significance (IgM MGUS), High-Intermediate risk MGUS    Clonal Cytopenia of Undetermined Significance (CCUS) diagnosed 1/29/25 -- given anemia, thrombocytopenia, and TET2 mutation    Severe Fatigue, Peripheral Neuropathy, Postural Hypotension    Differential diagnosis: Lymphoplasmacytic lymphoma, Waldenstrom's macroglobulinemia, non-secretory myeloma, evolving low-grade MDS.    Workup:  Labs 1/7/25   Blood counts:  Hb 8.4, hematocrit 25, plt 54 k, , absolute lymphocytes 850   Citrated platelet count 79.2. confirms true thrombocytopenia with plt < 150k  , uric acid 5.9  SPEP with PARIS: kappa free light chain 6.589, lambda normal 2.1, kappa/lambda ratio 3.02, beta globulins 1.25. Monoclonal IgM kappa seen (monoclonal spike in beta region)   QI: IgA 81.2, IgG 914, IgM 917.2 (elevated)  Beta-2 microglobulin: 5.97  Cryoglobulins: none detected  PN[facility] panel: no evidence of PNH  KWESI IFA Screen: Negative  HbSAg negative, HCV Ab nonreactive, Hep B surface Ab reactive,  HIV negative, Hep B Core Ab nonreactive  CT C/A/P with stable small RUL pulmonary nodule 5 mm, nodularity on liver (6 x 5 mm) sable from last time, and nabeel hepatis lymph node 18 x 11 mm decreased in size from previous, portal caval lymph node 25 x 14 mm (stable since previous study), aortocaval lymph node 5 x 5 mm stable    BMBx 1/16/25   Small monotypic lymphoplasmacytic infiltrate, consistent with IgM monoclonal gammopathy of undetermined significance.  Background hypercellular bone marrow with multilineage  hematopoiesis,  erythroid predominance, and mild megaloblastoid change.    Cytogenetics: normal karyotype  Special stains: negative for congo red stain.     1/23/25: I spoke to our pathologist Dr. Lucas in detail regarding this patient's Bone marrow biopsy results. She has < 5 % plasma cells in the bone marrow, excluding the diagnosis of multiple myeloma at this time. She does have IgM MGUS with hypercellular bone marrow (50% cellular).  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 Gly1 361Arg, Frequency 24%    1/23/25: Started prednisone 70 mg PO every day along with daily pantoprazole for stress ulcer prophylaxis.     1/28/25 Labs  SPEP with monoclonal spike in beta region. Monoclonal IgM kappa. Kappa free light chaine 2.752 , lambda 0.848, k/l ratio 3.25 (up from 3.02)  IgM decreased to 536.4 after initiating prednisone  Hemolysis labs improving (reticulocyte, LDH, haptoglobin)  proBNP elevated at 1677  High sensitivity troponin <3  EKG with ,  (mildly low)  2D echo: EF 65-70%, no mention of abnormal global longitudinal strain  TSH, prolcatin, estradiol, testosterone are normal -- making POEMS less likely given lack of endocrinopathy. Her fasting glucose is high because she is on a steroid course    1/30/25  - Patient now has CCUS as documented above, given she has clear evidence of anemia, thrombocytopenia, and TET-2 mutation with frequency 24% (> 2%). This may be a sign of low-grade evolving MDS. It's unclear if this is a separate process from her high-intermediate risk IgM MGUS.   - Of note, the patient's IgM level did decrease and her thrombocytopenia has completely resolved after I started steroids, which makes me suspicious of an underlying lymphoproliferative disorder. LPL and WM are on the differential.   - We have sent out her pathology  slides for review at [facility], where she has an appointment the week of 2/4/25.  - Given her persistent bilateral finger neuropathy, I have referred to neurology for EMG.  - I reviewed her further lab results above. I am not sure why her proBNP is so elevated in the absence of heart failure. She does not have any signs of organomegaly. Troponin, EKG, Echo are normal. Less likely amyloidosis  - Serum hyperviscosity normal  - PET-CT scan pending. She will need tissue biopsy if there is any abnormal uptake/mass  - SPEP with PARIS, QI, kappa/lambda ratio every 3 months (standing orders placed)      TODAY 2/6/25  Hb has stabilized, now consistently > 10 g/dL.   Labs today with Hb 10.7 g/dL.  Continue steroid taper as below - decrease to 50 mg daily x 1 week per taper below.       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 Concerning for underlying lymphoproliferative disorder   - hemolysis labs 1/7/25: reticulocyte % 16.5, retic absolute 405 (very elevated),  (elevated), Blood bank Antibody screen positive with Warm IgG antibodies. ALMA positive for poly & IgG (+4); negative for C3D, Tbili 3.3 with indirect bili 2.2, haptoglobin < 10  - labs 1/23/25: , tbili 6.2 , indirect bilirubin 5.6, haptoglobin < 10, Hb 7.7 g/dL, hematocrit 22.1, plt 145 (improved from 54), retic 13.6% and absolute retic 301, ALMA Poly positive, IgG positive, C3D Negative  - 1/23/25: started prednisone 70 mg daily along with GI stress ulcer prophylaxis    Plan:  - Labs today show significantly improved Hb at 9.3 g/dL, up from 7.7 g/dL with just 7 days of steroids  - TAPER steroids as follows:  1/31 - 2/6: prednisone 60 mg daily  2/7 - 2/13: prednisone 50 mg daily  2/14- 2/20: prednisone 40 mg daily  2/21- 2/27: prednisone 30 mg daily  2/28 - 3/6: prednisone 20 mg daily  3/7 - 3/13: prednisone 10 mg  daily  3/14 - 3/20: prednisone 7.5 mg daily  3/21 - 3/27: prednisone 5.0 mg daily  3/28 - 4/3: prednisone 2.5 mg daily  4/4/2025: OFF steroids. Can discontinue PPI if no GERD or GI symptoms    - referral to endocrinology given hyperglycemia secondary to steroids; we will need endocrinology recommendations given she is on a long-taper of steroids. I appreciate endocrinology's assistance in this patient's care.       3. Hyperglycemia secondary to steroid administration  - Patient is on insulin to control blood sugars  - Following with endocrinology, I appreciate their recommendations    4. CKD stage 3a  - baseline Cr 1-1.3  - following with nephrology, I appreciate their recommendations      Follow-Up: Return to clinic 2/20/25 for repeat labs and visit with me.       Bteh Ojeda D.O.  [facility]       Time spent on this encounter:  Pre-charting/reviewing medical records:  20 minutes  In room with patient obtaining history, performing exam, counseling on diagnosis, and reviewing plan: 30 minutes  Orders/notes:  90 minutes  Total time: 140 minutes

## 2025-02-07 ENCOUNTER — OFFICE VISIT (OUTPATIENT)
Facility: CLINIC | Age: 75
End: 2025-02-07
Payer: MEDICARE

## 2025-02-07 VITALS
OXYGEN SATURATION: 100 % | SYSTOLIC BLOOD PRESSURE: 126 MMHG | DIASTOLIC BLOOD PRESSURE: 74 MMHG | HEIGHT: 62.5 IN | WEIGHT: 140 LBS | BODY MASS INDEX: 25.12 KG/M2 | HEART RATE: 68 BPM

## 2025-02-07 DIAGNOSIS — T38.0X5A STEROID-INDUCED HYPERGLYCEMIA: ICD-10-CM

## 2025-02-07 DIAGNOSIS — R73.9 STEROID-INDUCED HYPERGLYCEMIA: ICD-10-CM

## 2025-02-07 DIAGNOSIS — E11.65 TYPE 2 DIABETES MELLITUS WITH HYPERGLYCEMIA, WITH LONG-TERM CURRENT USE OF INSULIN (HCC): Primary | ICD-10-CM

## 2025-02-07 DIAGNOSIS — Z79.52 CURRENT CHRONIC USE OF SYSTEMIC STEROIDS: ICD-10-CM

## 2025-02-07 DIAGNOSIS — E11.29 TYPE 2 DIABETES MELLITUS WITH ALBUMINURIA (HCC): ICD-10-CM

## 2025-02-07 DIAGNOSIS — R80.9 TYPE 2 DIABETES MELLITUS WITH ALBUMINURIA (HCC): ICD-10-CM

## 2025-02-07 DIAGNOSIS — Z79.4 TYPE 2 DIABETES MELLITUS WITH HYPERGLYCEMIA, WITH LONG-TERM CURRENT USE OF INSULIN (HCC): Primary | ICD-10-CM

## 2025-02-07 PROCEDURE — G2212 PROLONG OUTPT/OFFICE VIS: HCPCS | Performed by: STUDENT IN AN ORGANIZED HEALTH CARE EDUCATION/TRAINING PROGRAM

## 2025-02-07 PROCEDURE — 99215 OFFICE O/P EST HI 40 MIN: CPT | Performed by: STUDENT IN AN ORGANIZED HEALTH CARE EDUCATION/TRAINING PROGRAM

## 2025-02-07 RX ORDER — PEN NEEDLE, DIABETIC 31 GX5/16"
NEEDLE, DISPOSABLE MISCELLANEOUS
Qty: 300 EACH | Refills: 2 | Status: SHIPPED | OUTPATIENT
Start: 2025-02-07

## 2025-02-07 RX ORDER — PEN NEEDLE, DIABETIC 32GX 5/32"
1 NEEDLE, DISPOSABLE MISCELLANEOUS AS DIRECTED
Qty: 100 EACH | Refills: 3 | Status: SHIPPED | OUTPATIENT
Start: 2025-02-07

## 2025-02-07 RX ORDER — INSULIN HUMAN 100 [IU]/ML
INJECTION, SUSPENSION SUBCUTANEOUS
Qty: 15 ML | Refills: 1 | Status: SHIPPED | OUTPATIENT
Start: 2025-02-07

## 2025-02-07 NOTE — PROGRESS NOTES
DATE- Applied Freestyle Barbi 3 Plus on the back of patients Left arm. Tolerated well. Patient to present to clinic in 2 weeks with [facility] for Glucose check in to review the data. Patient expressed understanding.      Lot: [id number]  SN: [id number]  Exp:2025-05-31
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
## 2025-02-07 NOTE — PATIENT INSTRUCTIONS
Summary of today's visit:  Medication changes:    STOP Glargine for now as well as aspart   Continue Jardiance 25 mg  START Insulin NPH with Prednisone. With the following dose adjustments   1/31 - 2/6: prednisone 60 mg daily  2/7 - 2/13: prednisone 50 mg daily: NPH 25 units in am  2/14- 2/20: prednisone 40 mg daily: NPH 20 units  2/21- 2/27: prednisone 30 mg daily:NPH 15 units  2/28 - 3/6: prednisone 20 mg daily: NPH 10 units (further doses TBD after meeting Ohara)  3/7 - 3/13: prednisone 10 mg daily  3/14 - 3/20: prednisone 7.5 mg daily  3/21 - 3/27: prednisone 5.0 mg daily  3/28 - 4/3: prednisone 2.5 mg daily  4/4/2025: OFF steroids.     BECAUSE YOU ARE ON CHRONIC STEROIDS - ONCE YOU STOP THEM ON 4/4, PLEASE COME IN THE MORNING AT 8 AM ON 4/7 TO GET LABS. You can go to any [facility] facility to get labs     If you are sick, having nausea, vomiting, dizziness or light-headedness -which could be signs of adrenal isufficiency, please call our office and continue on prednisone 2.5 mg daily    - If a major surgery requiring general anesthesia is being planned, please notify your endocrinologist so that your endocrinologists can give instructions to the anesthesia team that will be taking care of you with regards to the amount of hydrocortisone to be given during surgery    New CGM: We want to start you on a continuous glucose monitor. You need to call your insurance to find out which continuous glucose monitor is preferred by your insurance (Dexcom or Barbi). You can then call our office to let us know so we can order the correct monitor for you. We will then schedule you with [facility] for training and set up for your glucose monitor. If on insulin, please ensure you have glucagon at home for emergencies   Additional comments:   - If labs were ordered today, please complete prior to your follow up visit.   - Please let us know if you require any refills at least 1 week prior to your medication running out. If you do run out  of medication, please call our office ASAP to request refills (do not wait until your follow up).   - Please call our office if sugars at home are consistently greater than 250 or less than 70 for medication adjustment (do not wait until your follow up appointment).  Lab results and imaging will typically be reviewed at follow up appointments, or within 3-5 business days of ALL results being in if you do not have an appointment scheduled in the near future. Our office will contact you for any abnormal results requiring more urgent follow up or action.   The on-call pager is for urgent matters only. If you are a type 1 diabetic and run out of insulin after business hours 8AM-4PM, you may call the on-call pager for a refill to a 24 hour pharmacy. All other refill requests should be requested during business hours.      Return Visit:  APN:   [x]  Dr. Atkinson in 2 months for DM  []  Directions to 1st floor lab    []  Give blood sugar log  []  PAP paperwork for Ozempic/Jardiance (english/Slovak)     Please schedule the following appointment with our clinic Diabetes Educator, Mayda:     60 minute in person or video visits:  []New Diagnosis Review   []Carb Counting 101   []Type 2 Diet and Lifestyle    []Type 1 / JAZMIN Refresher Course   []Diabetes and Pregnancy counseling   []Pump 101     30 minute virtual check ins (can be in person if preferred!):  [x]2 week blood glucose check in   []2 week pump settings check in   []3 week GLP-1 check in     30 minute in person training:  []New Start Insulin   []New Start GLP-1     If you were prescribed a continuous glucose monitor (CGM)or insulin pump at this visit, please call our office at: [phone], as soon as you have picked up ALL of your supplies.  You will then meet with Mayda for an IN PERSON training.  When you call tell the  you are scheduling with Mayad for one of the following:    [] 60 min New Start CGM - tell them the name of the CGM   [] 90 min New Start Pump -  tell the the name of the pump and remember to BRING INSULIN to your training             HOW TO TREAT LOW BLOOD SUGAR (Hypoglycemia)  Low blood sugar= Less than 70, or if you start to have symptoms (below)  Symptoms: Shaking or trembling, fast heart rate, extreme hunger, sweating, confusion/difficulty concentrating, dizziness.    How to treat a low blood sugar if you are able to eat/drink: The Rule of 15/15  If you are using continuous glucose monitor that says you are low, but you do not have any symptoms, verify on fingerstick that your blood sugar is actually low before treating.   Eat 15 grams of carbs (see examples below)  Check your blood sugar after 15 minutes. If it’s still below your target range, have another serving.   Repeat these steps until it’s in your target range. Once it’s in range, if you're nervous about your sugar going low again, have a protein source (ie, a spoonful of peanut butter).   If you have a CGM you want to look for how your arrow has changed. If you arrow is pointed up or sideways after 15 min, give your CGM more time OR check with a finger stick. Try not to eat more food until at least 15 min after the first BG check - otherwise you risk having a rebound high.  If you are experiencing symptoms and you are unable to check your blood glucose for any reason, treat the hypoglycemia.  If someone has a low blood sugar and is unconscious: Don’t hesitate to call 911. If someone is unconscious and glucagon is not available or someone does not know how to use it, call 911 immediately.     To treat a low, I recommend you carry with you easy, pre-portioned treatment for low blood sugars that are 15G of carbs:   - Children sized squeeze pouch applesauce (high fiber + carbs help prevent too high of a spike)  - Small children's sized juicebox- 15g carb --> 4oz juice box  - Glucose tablets from [employer] you can find them near diabetes supplies --> Note, you will need to eat 3-4 tablets to get  to 15g of carbs  - Children sized fruit snack pack- look for one with 15 grams of total carbohydrate  - Choice of how to treat your low is important. Complex carbs, or foods that contain fats along with carbs (like chocolate) can slow the absorption of glucose and should NOT be used to treat an emergency low        - Please review the following if you are taking JARDIANCE   This medication will remove extra sugar out of blood into your urine. It can be dosed anytime of day, but morning is probably best time to take it as it can cause increased urination. Please drink at least 4 glasses of water daily to avoid dehydration. It does not cause low blood sugars (hypoglycemia). If you develop any low blood sugars, we will need to adjust other medications. Please call me if you develop any symptoms of urinary tract infection (burning with urination) or yeast infection (new rash itching or burning in the genital area).     While you are taking this medication please be mindful of sick day protocol (for illness, if you are vomiting, excessively exercising/sweating, or alcohol intake) to avoid dehydration:   -Temporarily hold the medication if you are in a dehydrated state. You can call the clinic for further instruction if you are sick and can't remember which diabetes medication to hold.  -Check blood sugar levels more often while sick  -Seek medical help early if you develop any abdominal pain, nausea or vomiting.  -Okay to resume this medication once you are eating /drinking regularly and no longer dehydrated.    Surgery protocol:  If you are having surgery please hold at least 4 days prior to your procedure.. Once eating/drinking normally after surgery, ok to resume (this avoids dehydration).

## 2025-02-11 ENCOUNTER — TELEPHONE (OUTPATIENT)
Facility: CLINIC | Age: 75
End: 2025-02-11

## 2025-02-11 NOTE — TELEPHONE ENCOUNTER
Patient placed call to clinic.  States Landon was reading about 100 points off consistently for 3 days.  Patient reported the issue to [facility] and will receive a replacement sensor.  [facility] verified that patient acted as she should have.  Has follow up on 2/19 to go over blood glucose readings on trial of CGM.

## 2025-02-12 ENCOUNTER — PATIENT OUTREACH (OUTPATIENT)
Dept: CASE MANAGEMENT | Age: 75
End: 2025-02-12

## 2025-02-12 ENCOUNTER — TELEPHONE (OUTPATIENT)
Facility: CLINIC | Age: 75
End: 2025-02-12

## 2025-02-12 NOTE — TELEPHONE ENCOUNTER
Called patient due to significant lows and due to discrepancy in insulin instructions on AVS    -Pt called the Barbi rep with discrepant BG levels - was told that sensor is malfunctioning. Would send a replacement sensor. Has not received one so far  -Reviewed CGM data with her.  -Noted to have overnight hypoglycemia.  -Patient reports that she did recheck with fingersticks which confirmed blood glucose levels in 60s.  Patient also symptomatic.  Current insulin regimen:  -NPH 25 units in a.m. with prednisone 50 mg daily  -Patient had stopped glargine  -Patient was continuing aspart 3 units with meals as well as doing 2-hour postprandial insulin sliding scale    New instructions:  -Continue NPH 25 units in a.m. with prednisone 50 mg daily.  Continue to titrate down NPH with changes in prednisone dose as mentioned previously in AVS  2/14- 2/20: prednisone 40 mg daily: NPH 20 units  2/21- 2/27: prednisone 30 mg daily:NPH 15 units  2/28 - 3/6: prednisone 20 mg daily: NPH 10 units (further doses TBD after meeting Ohara)  -Stop aspart 3 units with meals TID  -Stop aspart sliding scale postprandially  -Start aspart sliding scale prior to meals using the following scale:  -1:40 >180 (to be given pre-prandially TID before lunch and dinner only  -Hypoglycemia precautions

## 2025-02-12 NOTE — TELEPHONE ENCOUNTER
Dorys, RN nurse care manager  at phone number: [phone]    Patient concerned about recent low sugars and possible discrepancies with after visit summary in regards to medication.    Patient wearing Barbi- but the sensor has been off (will be starting new sensor today)  so she has been finger sticking and confirmed the lows.    Symptoms: feeling shaky, blurry vision (states this is pretty consistent and also due to steroid use)     Readings given to care manager: fasting around 8 am- been in the 60's   This mornin  Yesterday mornin  Also low at night- multiple instances in the 60's    Barbi downloaded and placed in G drive for provider viewing    Per AVS from 25 visit- patient to stop long AND short acting insulin in favor of NPH regimen with Prednisone use  Continue Jardiance 25 mg daily    Sounds like patient did not stop her short acting insulin and has been doing 3 units pre meal, 3 times daily still.    Current regimen:  NPH 25 units per - timeline with Prednisone 50 mg daily  Jardiance 25 mg daily  Aspart 3 units pre meal 3 times daily plus 1 unit for every 30 units of 140- only use sliding scale 2 hours postprandial (last injection around 8 pm at night post dinner)     Dorys RN states patient should be reached by mobile phone.    RN phoned patient at [phone] (Mobile) to confirm current medications- patient confirmed taking medications as above.    Per AVS and chart patient to stop long acting and short acting- per patient AVS, only states to stop long acting insulin

## 2025-02-12 NOTE — PROGRESS NOTES
Transitions of Care Navigation  Discharge Date: 2/1/25  Contact Date: 2/12/2025    Transitions of Care Assessment:  BROOK Follow-up Assessment    General:  Assessment completed with: Patient    Progress/Care Plan:  Is the patient progressing as planned?: Yes  Care Plan Update: Spoke with patient. Patient reports she has been feeling fatigued. She went to [facility] this week and felt everything went well. The patient reports her blood sugar readings have been over the place. She recently went in to see the Endocrinologist- Freestyle Barbi placed in the office. She reports however the CGM has been malfunctioning. Readings are not consistent with her fingerstick glucose checks. The patient reports a preprandial glucose this morning of 61. 2 hrs postprandial 314. Patient reports on 02/10- the sensor indicated it was 341 but fingerstick said her sugar was 432. She is on chronic steroids. She reports these past few days however she has been receiving more consistent readings of low blood sugars in the 60s in the mornings. She is having more episodes of feeling shaky and blurred vision at times.  New Care Plan: Follow up with Endo reading hypoglycemia and insulin dosing  Frequency/Follow Up Plan: 14 day follow up call     Notes:  Navigator Notes: management of hypoglycemia     Nursing Interventions:  NCM advised patient to only go by the fingerstick glucose checks as they are more accurate. Patient reports she has already contacted her Endocrinologist yesterday regarding this. Another sensor is being sent to her.   She has an appointment with her Endocrinologist 02/19-- for Diabetic Education.  Med changes reviewed with patient per Endocrinologist visit: 02/07/2025.   I am concerned for a discrepancy in her insulin instructions. Per LOV 02/07 patient instructed to stop Aspart but on her AVS to continue?  She is doing 3 units of rapid insulin pre meal along now with the NPH with the Prednisone. She is now receiving new low  blood sugar readings. I spoke with Zay GOLDEN regarding the patient regarding the low sugar readings and concern for her insulin instructions. Per Ted GOLDEN she will call patient to follow up.   [facility] called Dr. Atkinson- Endocrinology office [phone] - regarding hypoglycemia episodes and to clarify insulin dosing instructions.   All d/c instructions reviewed with pt.  Reviewed when to call MD vs when to go to ER/call 911.  Educated pt on the importance of taking all meds as prescribed as well as close f/u with PCP/specialists.  Pt verbalized understanding and will contact office with any further questions or concerns.     Future Appointments   Date Time Provider Department Center   2/17/2025  1:00 PM Nicho Bey MD ENIWARREN [facility] [place]   2/19/2025  8:15 AM EMG DIABETIC EDUCATOR ENDO EMG[facility]O [facility] [place]   2/20/2025 10:00 AM NP OOT NP [facility] [place] [facility]   2/20/2025 10:30 AM Beth Ojeda DO NP [facility] [place] [facility]   2/24/2025 10:30 AM Mary Pickard MD EMG 8 [facility] [place]   4/8/2025 10:00 AM Sudarshan Atkinson MD [id number] [facility] [place]

## 2025-02-14 RX ORDER — ATORVASTATIN CALCIUM 20 MG/1
20 TABLET, FILM COATED ORAL DAILY
Qty: 90 TABLET | Refills: 3 | Status: SHIPPED | OUTPATIENT
Start: 2025-02-14

## 2025-02-14 NOTE — TELEPHONE ENCOUNTER
atorvastatin 20 MG Oral Tab         Sig: Take 1 tablet (20 mg total) by mouth daily.    Disp: 90 tablet    Refills: 3    Start: 2/13/2025    Class: Normal    Non-formulary    Last ordered: 11 months ago (2/21/2024) by Mary Pickard MD    Patient comment: Note change from [facility] to [facility].    Cholesterol Medication Protocol [id number]/13/2025 01:38 PM   Protocol Details ALT < 80    ALT resulted within past year    Lipid panel within past 12 months    In person appointment or virtual visit in the past 12 mos or appointment in next 3 mos    Medication is active on med list      To be filled at: [facility] [address] [phone], [phone]          LOV: 12/13/2024  RTC:02/2025   Labs:06/11/2024  Last filled:11/16/2024  Future Appointments   Date Time Provider Department Center   2/17/2025  1:00 PM Nicho Bey MD ENIWYOLANDA [facility] [place]   2/19/2025  8:15 AM EMG DIABETIC EDUCATOR ENDO [facility] [place]   2/20/2025 10:00 AM NP OOT NP [facility] [place] [facility]   2/20/2025 10:30 AM Beth Ojeda DO NP [facility] [place] [facility]   2/24/2025 10:30 AM Mary Pickard MD [facility] 8 [facility] [place]   4/8/2025 10:00 AM Sudarshan Atkinson MD [id number] [facility] [place]

## 2025-02-17 ENCOUNTER — OFFICE VISIT (OUTPATIENT)
Dept: NEUROLOGY | Facility: CLINIC | Age: 75
End: 2025-02-17
Payer: MEDICARE

## 2025-02-17 VITALS
BODY MASS INDEX: 25.12 KG/M2 | SYSTOLIC BLOOD PRESSURE: 137 MMHG | WEIGHT: 140 LBS | HEART RATE: 78 BPM | DIASTOLIC BLOOD PRESSURE: 59 MMHG | RESPIRATION RATE: 16 BRPM | HEIGHT: 62.5 IN

## 2025-02-17 DIAGNOSIS — R20.0 NUMBNESS IN FEET: ICD-10-CM

## 2025-02-17 DIAGNOSIS — R20.8 DECREASED VIBRATORY SENSE: ICD-10-CM

## 2025-02-17 DIAGNOSIS — D47.2 MGUS (MONOCLONAL GAMMOPATHY OF UNKNOWN SIGNIFICANCE): Primary | ICD-10-CM

## 2025-02-17 PROCEDURE — 99204 OFFICE O/P NEW MOD 45 MIN: CPT | Performed by: OTHER

## 2025-02-17 RX ORDER — PREDNISONE 20 MG/1
40 TABLET ORAL DAILY
COMMUNITY

## 2025-02-17 RX ORDER — INSULIN HUMAN 100 [IU]/ML
INJECTION, SUSPENSION SUBCUTANEOUS
COMMUNITY

## 2025-02-17 RX ORDER — PANTOPRAZOLE SODIUM 40 MG/1
40 TABLET, DELAYED RELEASE ORAL DAILY
Qty: 30 TABLET | Refills: 1 | Status: SHIPPED | OUTPATIENT
Start: 2025-02-17 | End: 2025-05-13
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
## 2025-02-17 NOTE — PATIENT INSTRUCTIONS
Refill policies:    Allow 2-3 business days for refills; controlled substances may take longer.  Contact your pharmacy at least 5 days prior to running out of medication and have them send an electronic request or submit request through the “request refill” option in your [employer] account.  Refills are not addressed on weekends; covering physicians do not authorize routine medications on weekends.  No narcotics or controlled substances are refilled after noon on Fridays or by on call physicians.  By law, narcotics must be electronically prescribed.  A 30 day supply with no refills is the maximum allowed.  If your prescription is due for a refill, you may be due for a follow up appointment.  To best provide you care, patients receiving routine medications need to be seen at least once a year.  Patients receiving narcotic/controlled substance medications need to be seen at least once every 3 months.  In the event that your preferred pharmacy does not have the requested medication in stock (e.g. Backordered), it is your responsibility to find another pharmacy that has the requested medication available.  We will gladly send a new prescription to that pharmacy at your request.    Scheduling Tests:    If your physician has ordered radiology tests such as MRI or CT scans, please contact Central Scheduling at [phone] right away to schedule the test.  Once scheduled, the [facility] Centralized Referral Team will work with your insurance carrier to obtain pre-certification or prior authorization.  Depending on your insurance carrier, approval may take 3-10 days.  It is highly recommended patients assure they have received an authorization before having a test performed.  If test is done without insurance authorization, patient may be responsible for the entire amount billed.      Precertification and Prior Authorizations:  If your physician has recommended that you have a procedure or additional testing performed the [facility]  Centralized Referral Team will contact your insurance carrier to obtain pre-certification or prior authorization.    You are strongly encouraged to contact your insurance carrier to verify that your procedure/test has been approved and is a COVERED benefit.  Although the [facility] Centralized Referral Team does its due diligence, the insurance carrier gives the disclaimer that \"Although the procedure is authorized, this does not guarantee payment.\"    Ultimately the patient is responsible for payment.   Thank you for your understanding in this matter.  Paperwork Completion:  If you require FMLA or disability paperwork for your recovery, please make sure to either drop it off or have it faxed to our office at [phone]. Be sure the form has your name and date of birth on it.  The form will be faxed to our Forms Department and they will complete it for you.  There is a 25$ fee for all forms that need to be filled out.  Please be aware there is a 10-14 day turnaround time.  You will need to sign a release of information (JARRED) form if your paperwork does not come with one.  You may call the Forms Department with any questions at [phone].  Their fax number is [phone].
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
## 2025-02-19 ENCOUNTER — PATIENT OUTREACH (OUTPATIENT)
Dept: CASE MANAGEMENT | Age: 75
End: 2025-02-19

## 2025-02-19 ENCOUNTER — NURSE ONLY (OUTPATIENT)
Facility: CLINIC | Age: 75
End: 2025-02-19
Payer: MEDICARE

## 2025-02-19 DIAGNOSIS — E11.29 TYPE 2 DIABETES MELLITUS WITH ALBUMINURIA (HCC): Primary | ICD-10-CM

## 2025-02-19 DIAGNOSIS — R80.9 TYPE 2 DIABETES MELLITUS WITH ALBUMINURIA (HCC): Primary | ICD-10-CM

## 2025-02-19 PROCEDURE — 99211 OFF/OP EST MAY X REQ PHY/QHP: CPT | Performed by: STUDENT IN AN ORGANIZED HEALTH CARE EDUCATION/TRAINING PROGRAM

## 2025-02-19 RX ORDER — INSULIN ASPART 100 [IU]/ML
INJECTION, SOLUTION INTRAVENOUS; SUBCUTANEOUS
COMMUNITY

## 2025-02-19 NOTE — PROGRESS NOTES
Transitions of Care Navigation  Discharge Date: 2/1/25  Contact Date: 2/19/2025    Transitions of Care Assessment:  BROOK Follow-up Assessment    General:  Assessment completed with: Patient    Progress/Care Plan:  Is the patient progressing as planned?: Yes  Care Plan Update: Spoke with patient. The patient reports she is continuing to have high blood sugar readings in the afternoon. Low readings in the mornings. She does state she feels her blood sugars have been looking a little bit better than what they did before. She does continue to feel shaky but states it is not as bad as it was before.The patient reports she thinks she has a upper respiratory infection. Symptom onset X 1 week ago. Sore throat and chest congestion. Denies fevers, chills, body aches. Coughing w/ wheezing sometimes. She denies any shortness of breath. I offered her an office visit with her PCP either in person or virtual but she declined stating that tomorrow she has an in person visit with her Hematologist and prefers to address at that time. I did ask her if she can take a COVID test and she agreed to complete prior to her appointment.  New Care Plan: Follow up with Endo regarding blood sugar and insulin dosing. Prednisone taper. Hematology appointment scheduled for tomorrow 02/20/2025  Frequency/Follow Up Plan: 21 day follow up call     Notes:  Navigator Notes: blood sugar. prednisone       Nursing Interventions:  NCM advised patient to continue to monitor symptoms closely and go to the ER or an UC/IC if any worsening symptoms.  I offered her an office visit with her PCP either in person or virtual but she declined stating that tomorrow she has an in person visit with her Hematologist and prefers to address at that time. I did ask her if she can take a COVID test and she agreed to complete prior to her appointment with the Hematologist.   Reviewed when to call MD vs when to go to ER/call 911.  Educated pt on the importance of taking all meds  as prescribed as well as close f/u with PCP/specialists.  Pt verbalized understanding and will contact office with any further questions or concerns.       Medication changes reviewed with the patient (copied and pasted from Endocrinology TE today 02/19/2025)   2/19- 2/20: prednisone 40 mg daily: NPH 16 units  2/21- 2/27: prednisone 30 mg daily:NPH 11units  2/28 - 3/6: prednisone 20 mg daily: NPH 6 units    To help with the afternoon highs, can use the following scale for your short acting insulin with breakfast and lunch:     If below 150 take 0 units  If 151 - 180 take 1 unit  If 181 - 210 take 2 units  If 211 - 240 take 3 units  If 241 - 270 take 4 units  If 271 - 300 take 5 units  If 301 - 330 take 6 units  If 331 - 360 take 7 units  If 361 - 390 take 8 units  If 391 - 420 take 9 units  If above 421 take 10 units, if having nausea or vomiting please seek care at an ER     For dinner please use the following scale to avoid lows overnight:     If below 180 take 0 units  If 181 - 255 take 1 unit  If 256 - 330 take 2 units  If 331 - 405 take 3 units  If above 406 take 4 units and if having nausea or vomiting please seek care at an ER     Medications:  Medication Reconciliation:  I am aware of an inpatient discharge within the last 30 days.  The discharge medication list has been reconciled with the patient's current medication list and reviewed by me. See medication list for additions of new medication, and changes to current doses of medications and discontinued medications.  Current Outpatient Medications   Medication Sig Dispense Refill    pantoprazole 40 MG Oral Tab EC Take 1 tablet (40 mg total) by mouth daily. 30 tablet 1    predniSONE 20 MG Oral Tab Take 2 tablets (40 mg total) by mouth daily.      Insulin NPH, Human,, Isophane, (HUMULIN N KWIKPEN) 100 UNIT/ML Subcutaneous Suspension Pen-injector Inject into the skin. Take NPH as directed for steroid dose, start with 20 units. Take it with prednisone in the  morning      atorvastatin 20 MG Oral Tab Take 1 tablet (20 mg total) by mouth daily. 90 tablet 3    TRUEPLUS 5-BEVEL PEN NEEDLES 32G X 4 MM Does not apply Misc 1 each As Directed. 100 each 3    Alcohol Swabs (ALCOHOL PREP) Does not apply Pads Each time with insulin injection. Ok to dispense any insurance preferred, in stock option 300 each 2    TRUEPLUS INSULIN SYRINGE 31G X 5/16\" 0.3 ML Does not apply Misc 1 each daily.      empagliflozin (JARDIANCE) 25 MG Oral Tab Take 1 tablet (25 mg total) by mouth daily. 90 tablet 3    Cyanocobalamin (VITAMIN B 12 OR) Take by mouth daily.      FOLIC ACID OR Take by mouth daily.      triamcinolone 0.1 % External Cream Apply topically as needed.      losartan 100 MG Oral Tab Take 1 tablet (100 mg total) by mouth every evening. 90 tablet 3    Glucose Blood (ONETOUCH VERIO) In Vitro Strip E11.69 use to check sugars twice daily 100 strip 11    Mometasone Furoate 0.1 % External Cream Apply to AA BID x 2 weeks, then PRN 45 g 2    cetirizine 10 MG Oral Tab Take 1 tablet (10 mg total) by mouth daily.

## 2025-02-20 ENCOUNTER — APPOINTMENT (OUTPATIENT)
Age: 75
End: 2025-02-20
Attending: INTERNAL MEDICINE
Payer: MEDICARE

## 2025-02-20 ENCOUNTER — OFFICE VISIT (OUTPATIENT)
Age: 75
End: 2025-02-20
Attending: INTERNAL MEDICINE
Payer: MEDICARE

## 2025-02-20 VITALS
HEIGHT: 62.6 IN | OXYGEN SATURATION: 100 % | WEIGHT: 139 LBS | SYSTOLIC BLOOD PRESSURE: 146 MMHG | HEART RATE: 77 BPM | DIASTOLIC BLOOD PRESSURE: 75 MMHG | TEMPERATURE: 97 F | BODY MASS INDEX: 24.94 KG/M2 | RESPIRATION RATE: 16 BRPM

## 2025-02-20 DIAGNOSIS — D59.11 WARM AUTOIMMUNE HEMOLYTIC ANEMIA (HCC): ICD-10-CM

## 2025-02-20 LAB
ALBUMIN SERPL-MCNC: 4.3 G/DL (ref 3.2–4.8)
ALBUMIN/GLOB SERPL: 2 {RATIO} (ref 1–2)
ALP LIVER SERPL-CCNC: 99 U/L
ALT SERPL-CCNC: 22 U/L
ANION GAP SERPL CALC-SCNC: 9 MMOL/L (ref 0–18)
AST SERPL-CCNC: 13 U/L (ref ?–34)
BASOPHILS # BLD AUTO: 0.02 X10(3) UL (ref 0–0.2)
BASOPHILS NFR BLD AUTO: 0.2 %
BILIRUB DIRECT SERPL-MCNC: 0.4 MG/DL (ref ?–0.3)
BILIRUB SERPL-MCNC: 1 MG/DL (ref 0.2–1.1)
BUN BLD-MCNC: 36 MG/DL (ref 9–23)
CALCIUM BLD-MCNC: 9.4 MG/DL (ref 8.7–10.6)
CHLORIDE SERPL-SCNC: 104 MMOL/L (ref 98–112)
CO2 SERPL-SCNC: 26 MMOL/L (ref 21–32)
CREAT BLD-MCNC: 1.37 MG/DL
EGFRCR SERPLBLD CKD-EPI 2021: 41 ML/MIN/1.73M2 (ref 60–?)
EOSINOPHIL # BLD AUTO: 0.02 X10(3) UL (ref 0–0.7)
EOSINOPHIL NFR BLD AUTO: 0.2 %
ERYTHROCYTE [DISTWIDTH] IN BLOOD BY AUTOMATED COUNT: 13.4 %
GLOBULIN PLAS-MCNC: 2.2 G/DL (ref 2–3.5)
GLUCOSE BLD-MCNC: 262 MG/DL (ref 70–99)
HAPTOGLOB SERPL-MCNC: 165 MG/DL (ref 30–200)
HCT VFR BLD AUTO: 36.7 %
HGB BLD-MCNC: 12.1 G/DL
HGB RETIC QN AUTO: 32.9 PG (ref 28.2–36.6)
IMM GRANULOCYTES # BLD AUTO: 0.22 X10(3) UL (ref 0–1)
IMM GRANULOCYTES NFR BLD: 2.2 %
IMM RETICS NFR: 0.12 RATIO (ref 0.1–0.3)
LDH SERPL L TO P-CCNC: 182 U/L
LYMPHOCYTES # BLD AUTO: 0.49 X10(3) UL (ref 1–4)
LYMPHOCYTES NFR BLD AUTO: 5 %
MCH RBC QN AUTO: 32.1 PG (ref 26–34)
MCHC RBC AUTO-ENTMCNC: 33 G/DL (ref 31–37)
MCV RBC AUTO: 97.3 FL
MONOCYTES # BLD AUTO: 0.83 X10(3) UL (ref 0.1–1)
MONOCYTES NFR BLD AUTO: 8.4 %
NEUTROPHILS # BLD AUTO: 8.28 X10 (3) UL (ref 1.5–7.7)
NEUTROPHILS # BLD AUTO: 8.28 X10(3) UL (ref 1.5–7.7)
NEUTROPHILS NFR BLD AUTO: 84 %
OSMOLALITY SERPL CALC.SUM OF ELEC: 305 MOSM/KG (ref 275–295)
PLATELET # BLD AUTO: 204 10(3)UL (ref 150–450)
POTASSIUM SERPL-SCNC: 4.1 MMOL/L (ref 3.5–5.1)
PROT SERPL-MCNC: 6.5 G/DL (ref 5.7–8.2)
RBC # BLD AUTO: 3.77 X10(6)UL
RETICS # AUTO: 64.5 X10(3) UL (ref 22.5–147.5)
RETICS/RBC NFR AUTO: 1.7 %
SODIUM SERPL-SCNC: 139 MMOL/L (ref 136–145)
WBC # BLD AUTO: 9.9 X10(3) UL (ref 4–11)

## 2025-02-20 NOTE — PROGRESS NOTES
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 Pt went to [facility] for second opinion on 2/10/2025, states visit went well. Pt compliant with Prednisone taper - today is her last day of 40mg daily and will taper to 30mg daily tomorrow. She continues to have unchanged shakiness, unsteadiness, and trouble with sleep. She is following with endocrinology for her blood sugars.       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

## 2025-02-20 NOTE — PROGRESS NOTES
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 infection, or hemolysis) as well as an evolving low-grade myelodysplastic syndrome.  Based on morphology, a reactive etiology is favored.  Correlation with clinical findings and pending conventional cytogenetic studies/ molecular studies is suggested.     Dr. Goldberg has reviewed the case and concurs.         Ancillary Studies      Bone Marrow Microscopic Description:  CBC: January 6, 2025  WBC (103/ul) 7.4   RBC (106/ul) 2.38   HGB (gm/dl) 8.4   HCT (%) 25   PLATELET (103/uL) 54   MCV (fL) 105   MCH (pg) 35.3   MCHC (gm/dL) 33.6   RDW (%) 21.9   Neutrophils (103/uL) 5.07   Lymphocytes (103/uL) 0.85   Monocytes (103/uL) 0.40   Eosinophils (103/uL) 0.68   Basophils (103/uL) 0.12      Peripheral Blood Smear Interpretation: A recent peripheral blood smear is not available for review at the time of diagnosis.  Bone Marrow Aspirate, Clot, Biopsy And Touch Preps:   Adequacy: The bone marrow aspirate smears are cellular and adequate for evaluation.  The core biopsy consists of trabecular bone and adequate bone marrow.  Aspirate Differential (Percent of 500-cell count):      Granulocytes 41   Blasts <1   Normoblasts 52   Lymphocytes 5   Monocytes <1   Eosinophils 2    Basophils <1   Plasma Cells <1      Touch Preps: The touch preparations contain myeloid, erythroid and megakaryocytic elements  Clot: The particle clot section consists of cellular particles with myeloid erythroid and megakaryocytic elements  Cellularity:  ASPIRATE: Active  BIOPSY %: The core biopsy is hypercellular for age with an estimated cellularity of 50%.  Erythroid:  CELLULARITY: Increased  MORPHOLOGY: The erythroid series shows mild megaloblastoid changes.  Myeloid:  CELLULARITY: Adequate to slightly increased  MORPHOLOGY: The myeloid series shows progressive maturation.  Blasts are not increased  Megakaryocyte:  CELLULARITY: Adequate  MORPHOLOGY: The majority of megakaryocytes are unremarkable.  Rare small hypolobated megakaryocytes are noted  Lymphocytes: Lymphocytes are small and mature appearing.  There are small well-circumscribed nonparatrabecular lymphoid aggregates composed of small mature appearing lymphocytes.  Plasma Cells: Plasma cells are not significantly increased.  The plasma cells are small with condensed chromatin.  Based on immunohistochemistry, the plasma cells comprise less than 5% of bone marrow cellularity.  Sheets of plasma cells are not identified.  Bony Trabeculae: Unremarkable  Immunohistochemistry and Special Stains:     Immunohistochemistry:     Material:  Block A1/B1  Population:  Tissue     Antibody                          Result  CD3                                   See Comment  CD20                                See Comment  CD34                                See Comment                                See Comment                                    See Comment  Cyclin D1                         See Comment           Medical Necessity    Immunohistochemical stains were performed:                  See Comment                   Positive tissue controls were utilized in the staining process.  These slides were reviewed by the signout Pathologist and showed  appropriate staining results.     Interpreted by:  Juan Lucas MD     Methodology:         Immunohistochemical stains are performed on formalin-fixed, paraffin-embedded tissue sections.  Deparaffinization, antigen retrieval, and staining utilizes the automated Leica Goode III immunohistochemistry platform.  A proprietary, non-biotin, polymer-based detection system (Bond Polymer Refine DetectionTM ) is employed.  All antibodies are validated by [facility] Department of Pathology to document appropriate staining reactions.  Positive controls are utilized and show appropriate reactivity.     Special Stain(s):     Material:  Block A and Aspirate   Population:  Tissue     Stain                                Result  Iron                                    See Comment   Retic                                 See Comment                                              Positive tissue controls were utilized in the staining process.  These slides were reviewed by the [facility] Pathologist and showed appropriate staining results.     Interpreted by: Juan Lucas MD            Impression & Plan: Mrs. Childress is a 75 y/o F with PMHx of mildly elevated liver tests (+ smooth muscle antibody), HTN, HLD, Type 2 DM with albuminuria, basal cell carcinoma of skin of face s/p resection who presents for evaluation of anemia and thrombocytopenia. She was found to have IgM MGUS, CCUS, and warm autoimmune hemolytic anemia.       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   - hemolysis labs 1/7/25: reticulocyte % 16.5, retic absolute 405 (very elevated),  (elevated), Blood bank Antibody screen positive with Warm IgG antibodies. ALMA positive for poly & IgG (+4); negative for C3D, Tbili 3.3 with indirect bili 2.2, haptoglobin < 10  - labs 1/23/25: , tbili 6.2 , indirect bilirubin 5.6,  haptoglobin < 10, Hb 7.7 g/dL, hematocrit 22.1, plt 145 (improved from 54), retic 13.6% and absolute retic 301, ALMA Poly positive, IgG positive, C3D Negative  - 1/23/25: started prednisone 70 mg daily (1 mg/kg/daily) along with GI stress ulcer prophylaxis  - 2/10/25: She was seen at [facility] for second opinion on 2/10/25 by Dr. Tejinder Pineda (hematology/oncology), who agreed with my workup and management. He did have a suggestion to add 4-week course of rituximab, since it has been associated with longer chances of remission.  She has follow up with Dr. Pineda in April 2025. I greatly appreciate his input.  - 2/27/25: C1D1 Rituximab 375 mg/m2    Recommendations:    TODAY 2/20/25:  - Labs today show significantly improved Hb at 12.1 g/dL, up from 10.7 g/dL on last visit. Anemia has resolved her hemolysis labs today are normal. Her energy is significantly improved and she feels better, overall. Continue steroid taper as below:  - TAPER steroids as follows:  1/31 - 2/6: prednisone 60 mg daily  2/7 - 2/13: prednisone 50 mg daily  2/14- 2/20: prednisone 40 mg daily  2/21- 2/27: prednisone 30 mg daily  2/28 - 3/6: prednisone 20 mg daily  3/7 - 3/13: prednisone 10 mg daily  3/14 - 3/20: prednisone 7.5 mg daily  3/21 - 3/27: prednisone 5.0 mg daily  3/28 - 4/3: prednisone 2.5 mg daily  4/4/2025: OFF steroids. Can discontinue PPI if no GERD or GI symptoms    - Per literature, 4-week course of rituximab 375 mg/m2 weekly in conjunction with steroids in the first-line treatment setting of WAIHA has shown to improve complete remission rates. I will start rituximab on 2/27/25 (total of 4 weekly doses). Hep B profile drawn today and pending.    Today, I discussed with the patient her diagnosis of warm autoimmune hemolytic anemia, the proposed treatment consisting of rituximab. Possible risks of systemic immunotherapy were discussed in detail, including but not limited to -- infusion reaction, reactivation of hepatitis,  and neutropenia. Written information regarding immunotherapy schedule and side effects of each chemotherapy agent were provided at bedside. After thorough discussion, patient has agreed to receive this treatment. All of patient's and patient's family's questions were answered thoroughly.     - endocrinology following for hyperglycemia secondary to steroids. She is being managed by with daily insulin. I appreciate endocrinology's assistance in this patient's care.       IgM Monoclonal Gammopathy of Undetermined Significance (IgM MGUS), High-Intermediate risk MGUS -- Improved with steroids           Clonal Cytopenia of Undetermined Significance (CCUS) diagnosed 1/29/25 -- given anemia, thrombocytopenia, and TET2 mutation    Severe Fatigue (Improved), Peripheral Neuropathy, Postural Hypotension    Differential diagnosis: Lymphoplasmacytic lymphoma, Waldenstrom's macroglobulinemia, non-secretory myeloma, evolving low-grade MDS.    Workup:  Labs 1/7/25   Blood counts:  Hb 8.4, hematocrit 25, plt 54 k, , absolute lymphocytes 850   Citrated platelet count 79.2. confirms true thrombocytopenia with plt < 150k  , uric acid 5.9  SPEP with PARIS: kappa free light chain 6.589, lambda normal 2.1, kappa/lambda ratio 3.02, beta globulins 1.25. Monoclonal IgM kappa seen (monoclonal spike in beta region)   QI: IgA 81.2, IgG 914, IgM 917.2 (elevated)  Beta-2 microglobulin: 5.97  Cryoglobulins: none detected  PNH panel: no evidence of PNH  KWESI IFA Screen: Negative  HbSAg negative, HCV Ab nonreactive, Hep B surface Ab reactive,  HIV negative, Hep B Core Ab nonreactive  CT C/A/P with stable small RUL pulmonary nodule 5 mm, nodularity on liver (6 x 5 mm) sable from last time, and nabeel hepatis lymph node 18 x 11 mm decreased in size from previous, portal caval lymph node 25 x 14 mm (stable since previous study), aortocaval lymph node 5 x 5 mm stable    BMBx 1/16/25   Small monotypic lymphoplasmacytic infiltrate, consistent  with IgM monoclonal gammopathy of undetermined significance.  Background hypercellular bone marrow with multilineage hematopoiesis,  erythroid predominance, and mild megaloblastoid change.    Cytogenetics: normal karyotype  Special stains: negative for congo red stain.     1/23/25: I spoke to our pathologist Dr. Lucas in detail regarding this patient's Bone marrow biopsy results. She has < 5 % plasma cells in the bone marrow, excluding the diagnosis of multiple myeloma at this time. She does have IgM MGUS with hypercellular bone marrow (50% cellular).  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 Gly1 361Arg, Frequency 24%    1/23/25: Started prednisone 70 mg PO every day (1mg/kg/daily) along with daily pantoprazole for stress ulcer prophylaxis.     1/28/25 Labs:  SPEP with monoclonal spike in beta region. Monoclonal IgM kappa. Kappa free light chains 2.752 , lambda 0.848, k/l ratio 3.25 (up from 3.02)  IgM decreased to 536.4 after initiating prednisone  Hemolysis labs improving (reticulocyte, LDH, haptoglobin)  proBNP elevated at 1677  High sensitivity troponin <3  EKG with ,  (mildly low)  2D echo: EF 65-70%, no mention of abnormal global longitudinal strain  TSH, prolcatin, estradiol, testosterone are normal -- making POEMS less likely given lack of endocrinopathy. Her fasting glucose is high because she is on a steroid course    2/10/25: Patient seen by Dr. Tejinder Pineda at [facility] for second opinion. He does not think patient has Waldenstrom's since cytopenias in WM are usually due to bulky marrow involvement --which patient does not have. I agree with his assessment.   Labs at [facility] with negative cold agglutinin titer, Kappa/lambda 2.47 (elevated ) with kappa 2.10 and lambda light chain 0.85, QI with IgM 366 (High)/IgA  36(low)/IgG 514 (low), SPEP with Monoclonal M-protein of 0.3 g/dL    Recommendations:   - Patient now has CCUS as documented above, given she has clear evidence of anemia, thrombocytopenia, and TET-2 mutation with frequency 24% (> 2%). This may be a sign of low-grade evolving MDS. It's unclear if this is a separate process from her high-intermediate risk IgM MGUS.   - Of note, the patient's IgM level did decrease and her thrombocytopenia has completely resolved after I started steroids.  Monitor labs every 3 months: SPEP with PARIS, QI, kappa/lambda ratio (standing orders placed)  - Given her persistent bilateral finger neuropathy, I have referred to neurology for EMG. She is scheduled for EMG March 2025.   - I reviewed her further lab results above. I am not sure why her proBNP is so elevated in the absence of heart failure. She does not have any signs of organomegaly. Troponin, EKG, Echo are normal. Less likely amyloidosis  - Serum hyperviscosity normal  - PET-CT scan pending. She will need tissue biopsy if there is any abnormal uptake/mass. This will be completed after she completes her steroid taper, as hyperglycemia has been variable      3. Hyperglycemia secondary to steroid administration  - Patient is on insulin to control blood sugars  - Following with endocrinology, I appreciate their recommendations    4. CKD stage 3a  - baseline Cr 1-1.3  - following with nephrology, I appreciate their recommendations      Follow-Up: Return to clinic 2/27/25 for repeat labs, visit with me, and C1D1 Rituximab (needs pre-meds with long-infusion for first infusion).       Beth Ojeda D.O.  [facility]

## 2025-02-23 ENCOUNTER — PATIENT MESSAGE (OUTPATIENT)
Facility: CLINIC | Age: 75
End: 2025-02-23

## 2025-02-24 ENCOUNTER — OFFICE VISIT (OUTPATIENT)
Dept: INTERNAL MEDICINE CLINIC | Facility: CLINIC | Age: 75
End: 2025-02-24
Payer: MEDICARE

## 2025-02-24 ENCOUNTER — LAB ENCOUNTER (OUTPATIENT)
Dept: LAB | Age: 75
End: 2025-02-24
Attending: INTERNAL MEDICINE
Payer: MEDICARE

## 2025-02-24 VITALS
HEIGHT: 62.5 IN | SYSTOLIC BLOOD PRESSURE: 122 MMHG | TEMPERATURE: 97 F | OXYGEN SATURATION: 99 % | WEIGHT: 137.19 LBS | DIASTOLIC BLOOD PRESSURE: 48 MMHG | BODY MASS INDEX: 24.62 KG/M2 | HEART RATE: 81 BPM

## 2025-02-24 DIAGNOSIS — C44.319 BASAL CELL CARCINOMA (BCC) OF SKIN OF OTHER PART OF FACE: ICD-10-CM

## 2025-02-24 DIAGNOSIS — E11.59 HYPERTENSION ASSOCIATED WITH DIABETES (HCC): ICD-10-CM

## 2025-02-24 DIAGNOSIS — E11.69 HYPERLIPIDEMIA ASSOCIATED WITH TYPE 2 DIABETES MELLITUS (HCC): ICD-10-CM

## 2025-02-24 DIAGNOSIS — E78.5 HYPERLIPIDEMIA ASSOCIATED WITH TYPE 2 DIABETES MELLITUS (HCC): ICD-10-CM

## 2025-02-24 DIAGNOSIS — D47.2 MGUS (MONOCLONAL GAMMOPATHY OF UNKNOWN SIGNIFICANCE): ICD-10-CM

## 2025-02-24 DIAGNOSIS — D75.9 CLONAL CYTOPENIA OF UNDETERMINED SIGNIFICANCE (CCUS): ICD-10-CM

## 2025-02-24 DIAGNOSIS — K76.0 FATTY LIVER DETERMINED BY BIOPSY: ICD-10-CM

## 2025-02-24 DIAGNOSIS — Z12.31 ENCOUNTER FOR SCREENING MAMMOGRAM FOR MALIGNANT NEOPLASM OF BREAST: ICD-10-CM

## 2025-02-24 DIAGNOSIS — R91.1 NODULE OF LOWER LOBE OF RIGHT LUNG: ICD-10-CM

## 2025-02-24 DIAGNOSIS — D59.11 WARM AUTOIMMUNE HEMOLYTIC ANEMIA (HCC): ICD-10-CM

## 2025-02-24 DIAGNOSIS — Z00.00 ROUTINE GENERAL MEDICAL EXAMINATION AT A HEALTH CARE FACILITY: Primary | ICD-10-CM

## 2025-02-24 DIAGNOSIS — I15.2 HYPERTENSION ASSOCIATED WITH DIABETES (HCC): ICD-10-CM

## 2025-02-24 PROBLEM — D64.9 NORMOCYTIC ANEMIA: Status: RESOLVED | Noted: 2024-12-13 | Resolved: 2025-02-24

## 2025-02-24 PROBLEM — R80.9 TYPE 2 DIABETES MELLITUS WITH ALBUMINURIA (HCC): Status: RESOLVED | Noted: 2022-10-19 | Resolved: 2025-02-24

## 2025-02-24 PROBLEM — E66.3 OVERWEIGHT (BMI 25.0-29.9): Status: RESOLVED | Noted: 2021-01-20 | Resolved: 2025-02-24

## 2025-02-24 PROBLEM — R58 BLEEDING: Status: RESOLVED | Noted: 2024-12-13 | Resolved: 2025-02-24

## 2025-02-24 PROBLEM — E11.29 TYPE 2 DIABETES MELLITUS WITH ALBUMINURIA (HCC): Status: RESOLVED | Noted: 2022-10-19 | Resolved: 2025-02-24

## 2025-02-24 PROBLEM — R73.9 HYPERGLYCEMIA: Status: RESOLVED | Noted: 2025-01-31 | Resolved: 2025-02-24

## 2025-02-24 LAB
HBV CORE AB SERPL QL IA: NONREACTIVE
HBV SURFACE AB SER QL: REACTIVE
HBV SURFACE AB SERPL IA-ACNC: 28.32 MIU/ML
HBV SURFACE AG SER-ACNC: <0.1 [IU]/L
HBV SURFACE AG SERPL QL IA: NONREACTIVE

## 2025-02-24 PROCEDURE — 87340 HEPATITIS B SURFACE AG IA: CPT

## 2025-02-24 PROCEDURE — 86704 HEP B CORE ANTIBODY TOTAL: CPT

## 2025-02-24 PROCEDURE — 86706 HEP B SURFACE ANTIBODY: CPT

## 2025-02-24 PROCEDURE — 36415 COLL VENOUS BLD VENIPUNCTURE: CPT

## 2025-02-24 NOTE — PATIENT INSTRUCTIONS
You are due for your diabetic eye exam, cholesterol and urine testing in June, 2025.     I recommend the following vaccines -  Stay up to date with COVID vaccines.
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
## 2025-02-25 RX ORDER — METHYLPREDNISOLONE SODIUM SUCCINATE 125 MG/2ML
100 INJECTION INTRAMUSCULAR; INTRAVENOUS ONCE
Status: CANCELLED | OUTPATIENT
Start: 2025-02-27

## 2025-02-25 RX ORDER — ACETAMINOPHEN 325 MG/1
650 TABLET ORAL ONCE
OUTPATIENT
Start: 2025-03-06

## 2025-02-25 RX ORDER — DIPHENHYDRAMINE HYDROCHLORIDE 50 MG/ML
50 INJECTION INTRAMUSCULAR; INTRAVENOUS ONCE
OUTPATIENT
Start: 2025-03-20

## 2025-02-25 RX ORDER — METHYLPREDNISOLONE SODIUM SUCCINATE 125 MG/2ML
100 INJECTION INTRAMUSCULAR; INTRAVENOUS ONCE
OUTPATIENT
Start: 2025-03-06

## 2025-02-25 RX ORDER — ACETAMINOPHEN 325 MG/1
650 TABLET ORAL ONCE
OUTPATIENT
Start: 2025-03-13

## 2025-02-25 RX ORDER — DIPHENHYDRAMINE HYDROCHLORIDE 50 MG/ML
50 INJECTION INTRAMUSCULAR; INTRAVENOUS ONCE
OUTPATIENT
Start: 2025-03-06

## 2025-02-25 RX ORDER — METHYLPREDNISOLONE SODIUM SUCCINATE 125 MG/2ML
100 INJECTION INTRAMUSCULAR; INTRAVENOUS ONCE
OUTPATIENT
Start: 2025-03-20

## 2025-02-25 RX ORDER — ACETAMINOPHEN 325 MG/1
650 TABLET ORAL ONCE
Status: CANCELLED | OUTPATIENT
Start: 2025-02-27

## 2025-02-25 RX ORDER — DIPHENHYDRAMINE HYDROCHLORIDE 50 MG/ML
50 INJECTION INTRAMUSCULAR; INTRAVENOUS ONCE
OUTPATIENT
Start: 2025-03-13

## 2025-02-25 RX ORDER — DIPHENHYDRAMINE HYDROCHLORIDE 50 MG/ML
50 INJECTION INTRAMUSCULAR; INTRAVENOUS ONCE
Status: CANCELLED | OUTPATIENT
Start: 2025-02-27

## 2025-02-25 RX ORDER — METHYLPREDNISOLONE SODIUM SUCCINATE 125 MG/2ML
100 INJECTION INTRAMUSCULAR; INTRAVENOUS ONCE
OUTPATIENT
Start: 2025-03-13

## 2025-02-25 RX ORDER — ACETAMINOPHEN 325 MG/1
650 TABLET ORAL ONCE
OUTPATIENT
Start: 2025-03-20

## 2025-02-26 ENCOUNTER — PATIENT OUTREACH (OUTPATIENT)
Dept: CASE MANAGEMENT | Age: 75
End: 2025-02-26

## 2025-02-26 NOTE — TELEPHONE ENCOUNTER
Enrique watkins, I received message from Dr. Pickard. Okay to stop Jardiance due to poor appetite. Have signed forms on [employer] for [employer]bi..     For her current insulin doses..  2/21- 2/27: prednisone 30 mg daily:NPH 10 units  ·    2/28 - 3/6: prednisone 20 mg daily: NPH 5 units       To help with the highs with meals - please have pt use short acting insulin  1/2/2 before meals in addition to sliding scale (please reiterate that sliding scale is BEFORE meals, as pt was using it after meals when I had first seen her)     Continue sliding scale with breakfast and lunch:    If below 150 take 0 units  If 151 - 180 take 1 unit  If 181 - 210 take 2 units  If 211 - 240 take 3 units  If 241 - 270 take 4 units  If 271 - 300 take 5 units  If 301 - 330 take 6 units  If 331 - 360 take 7 units  If 361 - 390 take 8 units  If 391 - 420 take 9 units  If above 421 take 10 units, if having nausea or vomiting please seek care at an ER       For dinner please use the following scale to avoid lows overnight:    If below 180 take 0 units  If 181 - 255 take 1 unit  If 256 - 330 take 2 units  If 331 - 405 take 3 units  If above 406 take 4 units and if having nausea or vomiting please seek care at an ER

## 2025-02-26 NOTE — PROGRESS NOTES
Transitions of Care Navigation  Discharge Date: 25  Contact Date: 2025    Transitions of Care Assessment:  BROOK Follow-up Assessment    General:  Assessment completed with: Patient    Progress/Care Plan:  Is the patient progressing as planned?: Yes  Care Plan Update: Spoke with patient who reports she is doing well. Patient reports her CGM is going to  and was told by Endocrinology office yesterday they are ordering her a new one. She has her glucose meter at home as well to check her blood sugar if she needs to. She reports she continues to have high and low blood sugar readings and this is being managed by Endocrinology. Patient reports she continues to have blood sugar readings in the afternoon that are over 350 and is not sure what to do regarding this. Her blood sugar readings this morning have been steady around 80. The patient reports at her office visit with her PCP yesterday it was discussed if she should stop taking her Jardiance because of her poor appetite. She is skipping lunch because of the fear of her blood sugar being so high in the afternoon. She continues to be on Prednisone.  New Care Plan: Follow up with Endocrinology regarding blood sugar  Frequency/Follow Up Plan: program completion     Notes:  Navigator Notes: Barbi sensors, Prednisone, blood sugar management, jardiance       Nursing Interventions:    NCM called Endocrinology office and spoke with CHICO Arroyo regarding the status of the sensors as I do not see they were sent in the computer. Per Dina RN she will follow up regarding this and get back to me.   Reviewed when to call MD vs when to go to ER/call 911.  Educated pt on the importance of taking all meds as prescribed as well as close f/u with PCP/specialists.  Pt verbalized understanding and will contact office with any further questions or concerns.       Future Appointments   Date Time Provider Department Center   2025  9:00 AM NP [place] RN2 NP [facility]    2/27/2025 10:00 AM [facility]   3/14/2025 12:00 PM Nicho Bey MD [facility] [place]   4/8/2025 10:00 AM Sudarshan Atkinson MD [id number] [facility] [place]   4/28/2025 11:00 AM BBK YIFAN RM1 [facility] [place]   5/20/2025  1:40 PM Nicho Bey MD [facility] [place]

## 2025-02-26 NOTE — TELEPHONE ENCOUNTER
Received phone call from Dorys, Care Management RN.     Asking if Dr. Atkinson has received any message from Dr. Pickard- after OV on 2/24/25- regarding possibly holding Jardiance d/t poor appetite.    Also asking where Barbi sensors are coming from?    Spoke with patient on telephone:    This week Prednisone at 30mg daily    Patient states she is taking:     -Jardiance 25mg daily  -NPH- 11 units in the AM with Prednisone     Then because she feels she has been having high sugars- she has been using LUNCH scale with both LUNCH AND DINNER:   If below 150 take 0 units  If 151 - 180 take 1 unit  If 181 - 210 take 2 units  If 211 - 240 take 3 units  If 241 - 270 take 4 units  If 271 - 300 take 5 units  If 301 - 330 take 6 units  If 331 - 360 take 7 units  If 361 - 390 take 8 units  If 391 - 420 take 9 units  If above 421 take 10 units, if having nausea or vomiting please seek care at an ER     As far as Barbi sensors- patient had received 1 sensor from our office, and then 1 replacement from [employer]. Has never received through pharmacy/DME. She thought this was going to be ordered through DME.     Placed order in [place] for Barbi 3 +, sent to Dr. Atkinson for signature.

## 2025-02-26 NOTE — TELEPHONE ENCOUNTER
Phoned patient, no answer. Left voicemail asking for a call back.    [employer] message also sent.

## 2025-02-27 ENCOUNTER — OFFICE VISIT (OUTPATIENT)
Age: 75
End: 2025-02-27
Attending: INTERNAL MEDICINE
Payer: MEDICARE

## 2025-02-27 ENCOUNTER — APPOINTMENT (OUTPATIENT)
Age: 75
End: 2025-02-27
Attending: INTERNAL MEDICINE
Payer: MEDICARE

## 2025-02-27 VITALS
SYSTOLIC BLOOD PRESSURE: 155 MMHG | TEMPERATURE: 98 F | OXYGEN SATURATION: 100 % | HEIGHT: 61.1 IN | WEIGHT: 137.38 LBS | DIASTOLIC BLOOD PRESSURE: 87 MMHG | RESPIRATION RATE: 16 BRPM | BODY MASS INDEX: 25.94 KG/M2 | HEART RATE: 71 BPM

## 2025-02-27 DIAGNOSIS — D59.11 WARM AUTOIMMUNE HEMOLYTIC ANEMIA (HCC): Primary | ICD-10-CM

## 2025-02-27 LAB
ALBUMIN SERPL-MCNC: 4.3 G/DL (ref 3.2–4.8)
ALBUMIN/GLOB SERPL: 2 {RATIO} (ref 1–2)
ALP LIVER SERPL-CCNC: 99 U/L
ALT SERPL-CCNC: 24 U/L
ANION GAP SERPL CALC-SCNC: 8 MMOL/L (ref 0–18)
AST SERPL-CCNC: 15 U/L (ref ?–34)
BASOPHILS # BLD: 0 X10(3) UL (ref 0–0.2)
BASOPHILS NFR BLD: 0 %
BILIRUB SERPL-MCNC: 0.9 MG/DL (ref 0.2–1.1)
BUN BLD-MCNC: 29 MG/DL (ref 9–23)
CALCIUM BLD-MCNC: 9.6 MG/DL (ref 8.7–10.6)
CHLORIDE SERPL-SCNC: 108 MMOL/L (ref 98–112)
CO2 SERPL-SCNC: 25 MMOL/L (ref 21–32)
CREAT BLD-MCNC: 1.09 MG/DL
EGFRCR SERPLBLD CKD-EPI 2021: 54 ML/MIN/1.73M2 (ref 60–?)
EOSINOPHIL # BLD: 0 X10(3) UL (ref 0–0.7)
EOSINOPHIL NFR BLD: 0 %
ERYTHROCYTE [DISTWIDTH] IN BLOOD BY AUTOMATED COUNT: 13.4 %
GLOBULIN PLAS-MCNC: 2.2 G/DL (ref 2–3.5)
GLUCOSE BLD-MCNC: 199 MG/DL (ref 70–99)
HCT VFR BLD AUTO: 39.8 %
HGB BLD-MCNC: 13.1 G/DL
LYMPHOCYTES NFR BLD: 0.68 X10(3) UL (ref 1–4)
LYMPHOCYTES NFR BLD: 6 %
MCH RBC QN AUTO: 31.7 PG (ref 26–34)
MCHC RBC AUTO-ENTMCNC: 32.9 G/DL (ref 31–37)
MCV RBC AUTO: 96.4 FL
MONOCYTES # BLD: 0.9 X10(3) UL (ref 0.1–1)
MONOCYTES NFR BLD: 8 %
MORPHOLOGY: NORMAL
NEUTROPHILS # BLD AUTO: 8.96 X10 (3) UL (ref 1.5–7.7)
NEUTROPHILS NFR BLD: 78 %
NEUTS BAND NFR BLD: 8 %
NEUTS HYPERSEG # BLD: 9.72 X10(3) UL (ref 1.5–7.7)
OSMOLALITY SERPL CALC.SUM OF ELEC: 303 MOSM/KG (ref 275–295)
PLATELET # BLD AUTO: 261 10(3)UL (ref 150–450)
PLATELET MORPHOLOGY: NORMAL
POTASSIUM SERPL-SCNC: 3.9 MMOL/L (ref 3.5–5.1)
PROT SERPL-MCNC: 6.5 G/DL (ref 5.7–8.2)
RBC # BLD AUTO: 4.13 X10(6)UL
SODIUM SERPL-SCNC: 141 MMOL/L (ref 136–145)
TOTAL CELLS COUNTED BLD: 100
WBC # BLD AUTO: 11.3 X10(3) UL (ref 4–11)

## 2025-02-27 RX ORDER — METHYLPREDNISOLONE SODIUM SUCCINATE 125 MG/2ML
100 INJECTION INTRAMUSCULAR; INTRAVENOUS ONCE
Status: COMPLETED | OUTPATIENT
Start: 2025-02-27 | End: 2025-02-27

## 2025-02-27 RX ORDER — DIPHENHYDRAMINE HYDROCHLORIDE 50 MG/ML
50 INJECTION INTRAMUSCULAR; INTRAVENOUS ONCE
Status: COMPLETED | OUTPATIENT
Start: 2025-02-27 | End: 2025-02-27

## 2025-02-27 RX ORDER — ACETAMINOPHEN 325 MG/1
650 TABLET ORAL ONCE
Status: COMPLETED | OUTPATIENT
Start: 2025-02-27 | End: 2025-02-27

## 2025-02-27 RX ADMIN — DIPHENHYDRAMINE HYDROCHLORIDE 50 MG: 50 INJECTION INTRAMUSCULAR; INTRAVENOUS at 10:02:00

## 2025-02-27 RX ADMIN — METHYLPREDNISOLONE SODIUM SUCCINATE 100 MG: 125 INJECTION INTRAMUSCULAR; INTRAVENOUS at 10:00:00

## 2025-02-27 RX ADMIN — ACETAMINOPHEN 650 MG: 325 TABLET ORAL at 09:24:00

## 2025-02-27 NOTE — PROGRESS NOTES
Pt here for first Rituximab infusion. Pt did not take her steroid today per instructions. She is currently on 30mg Prednisone daily - she will start 20mg daily tomorrow. Pt continues to have unchanged unsteadiness and dizziness.          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

## 2025-02-27 NOTE — PROGRESS NOTES
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 undetermined significance.  Clinical and radiographic correlation is suggested.           Differential considerations for the hypercellular bone marrow with erythroid predominance and mild megaloblastoid change include reactive conditions (nutritional deficiency, toxins, autoimmune conditions, infection, or hemolysis) as well as an evolving low-grade myelodysplastic syndrome.  Based on morphology, a reactive etiology is favored.  Correlation with clinical findings and pending conventional cytogenetic studies/ molecular studies is suggested.     Dr. Goldberg has reviewed the case and concurs.         Ancillary Studies      Bone Marrow Microscopic Description:  CBC: January 6, 2025  WBC (103/ul) 7.4   RBC (106/ul) 2.38   HGB (gm/dl) 8.4   HCT (%) 25   PLATELET (103/uL) 54   MCV (fL) 105   MCH (pg) 35.3   MCHC (gm/dL) 33.6   RDW (%) 21.9   Neutrophils (103/uL) 5.07   Lymphocytes (103/uL) 0.85   Monocytes (103/uL) 0.40   Eosinophils (103/uL) 0.68   Basophils (103/uL) 0.12      Peripheral Blood Smear Interpretation: A recent peripheral blood smear is not available for review at the time of diagnosis.  Bone Marrow Aspirate, Clot, Biopsy And Touch Preps:   Adequacy: The bone  marrow aspirate smears are cellular and adequate for evaluation.  The core biopsy consists of trabecular bone and adequate bone marrow.  Aspirate Differential (Percent of 500-cell count):      Granulocytes 41   Blasts <1   Normoblasts 52   Lymphocytes 5   Monocytes <1   Eosinophils 2   Basophils <1   Plasma Cells <1      Touch Preps: The touch preparations contain myeloid, erythroid and megakaryocytic elements  Clot: The particle clot section consists of cellular particles with myeloid erythroid and megakaryocytic elements  Cellularity:  ASPIRATE: Active  BIOPSY %: The core biopsy is hypercellular for age with an estimated cellularity of 50%.  Erythroid:  CELLULARITY: Increased  MORPHOLOGY: The erythroid series shows mild megaloblastoid changes.  Myeloid:  CELLULARITY: Adequate to slightly increased  MORPHOLOGY: The myeloid series shows progressive maturation.  Blasts are not increased  Megakaryocyte:  CELLULARITY: Adequate  MORPHOLOGY: The majority of megakaryocytes are unremarkable.  Rare small hypolobated megakaryocytes are noted  Lymphocytes: Lymphocytes are small and mature appearing.  There are small well-circumscribed nonparatrabecular lymphoid aggregates composed of small mature appearing lymphocytes.  Plasma Cells: Plasma cells are not significantly increased.  The plasma cells are small with condensed chromatin.  Based on immunohistochemistry, the plasma cells comprise less than 5% of bone marrow cellularity.  Sheets of plasma cells are not identified.  Bony Trabeculae: Unremarkable  Immunohistochemistry and Special Stains:     Immunohistochemistry:     Material:  Block A1/B1  Population:  Tissue     Antibody                          Result  CD3                                   See Comment  CD20                                See Comment  CD34                                See Comment                                See Comment                                    See Comment  Cyclin D1                          See Comment           Medical Necessity    Immunohistochemical stains were performed:                  See Comment                   Positive tissue controls were utilized in the staining process.  These slides were reviewed by the signout Pathologist and showed appropriate staining results.     Interpreted by:  Juan Lucas MD     Methodology:         Immunohistochemical stains are performed on formalin-fixed, paraffin-embedded tissue sections.  Deparaffinization, antigen retrieval, and staining utilizes the automated Leica Goode III immunohistochemistry platform.  A proprietary, non-biotin, polymer-based detection system (Bond Polymer Refine DetectionTM ) is employed.  All antibodies are validated by [facility] Department of Pathology to document appropriate staining reactions.  Positive controls are utilized and show appropriate reactivity.     Special Stain(s):     Material:  Block A and Aspirate   Population:  Tissue     Stain                                Result  Iron                                    See Comment   Retic                                 See Comment                                              Positive tissue controls were utilized in the staining process.  These slides were reviewed by the signout Pathologist and showed appropriate staining results.     Interpreted by: Juan Lucas MD            Impression & Plan: Mrs. Childress is a 75 y/o F with PMHx of mildly elevated liver tests (+ smooth muscle antibody), HTN, HLD, Type 2 DM with albuminuria, basal cell carcinoma of skin of face s/p resection who presents for evaluation of anemia and thrombocytopenia. She was found to have IgM MGUS, CCUS, and warm autoimmune hemolytic anemia.       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   - hemolysis labs 1/7/25: reticulocyte % 16.5, retic  absolute 405 (very elevated),  (elevated), Blood bank Antibody screen positive with Warm IgG antibodies. ALMA positive for poly & IgG (+4); negative for C3D, Tbili 3.3 with indirect bili 2.2, haptoglobin < 10  - labs 1/23/25: , tbili 6.2 , indirect bilirubin 5.6, haptoglobin < 10, Hb 7.7 g/dL, hematocrit 22.1, plt 145 (improved from 54), retic 13.6% and absolute retic 301, ALMA Poly positive, IgG positive, C3D Negative  - 1/23/25: started prednisone 70 mg daily (1 mg/kg/daily) along with GI stress ulcer prophylaxis  - 2/10/25: She was seen at [facility] for second opinion on 2/10/25 by Dr. Tejinder Pineda (hematology/oncology), who agreed with my workup and management. He did have a suggestion to add 4-week course of rituximab, since it has been associated with longer chances of remission.  She has follow up with Dr. Pineda in April 2025. I greatly appreciate his input.  - 2/27/25: C1D1 Rituximab 375 mg/m2    Recommendations:    TODAY 2/27/25:  - Labs today show significantly improved Hb at 13.1 g/dL, up from 12.1 g/dL on last visit. Anemia has resolved her hemolysis labs today are normal. Her energy is significantly improved and she feels better, overall. Continue steroid taper as below:  - TAPER steroids as follows:  1/31 - 2/6: prednisone 60 mg daily  2/7 - 2/13: prednisone 50 mg daily  2/14- 2/20: prednisone 40 mg daily  2/21- 2/27: prednisone 30 mg daily  2/28 - 3/6: prednisone 20 mg daily  3/7 - 3/13: prednisone 10 mg daily  3/14 - 3/20: prednisone 7.5 mg daily  3/21 - 3/27: prednisone 5.0 mg daily  3/28 - 4/3: prednisone 2.5 mg daily  4/4/2025: OFF steroids. Can discontinue PPI if no GERD or GI symptoms    - Per literature, 4-week course of rituximab 375 mg/m2 weekly in conjunction with steroids in the first-line treatment setting of WAIHA has shown to improve complete remission rates. Hep B profile normal. Labs adequate for treatment. Proceed with C1D1 rituximab today.       IgM Monoclonal  Gammopathy of Undetermined Significance (IgM MGUS), High-Intermediate risk MGUS -- Improved with steroids           Clonal Cytopenia of Undetermined Significance (CCUS) diagnosed 1/29/25 -- given anemia, thrombocytopenia, and TET2 mutation    Severe Fatigue (Improved), Peripheral Neuropathy, Postural Hypotension    Differential diagnosis: Lymphoplasmacytic lymphoma, Waldenstrom's macroglobulinemia, non-secretory myeloma, evolving low-grade MDS.    Workup:  Labs 1/7/25   Blood counts:  Hb 8.4, hematocrit 25, plt 54 k, , absolute lymphocytes 850   Citrated platelet count 79.2. confirms true thrombocytopenia with plt < 150k  , uric acid 5.9  SPEP with PARIS: kappa free light chain 6.589, lambda normal 2.1, kappa/lambda ratio 3.02, beta globulins 1.25. Monoclonal IgM kappa seen (monoclonal spike in beta region)   QI: IgA 81.2, IgG 914, IgM 917.2 (elevated)  Beta-2 microglobulin: 5.97  Cryoglobulins: none detected  PNH panel: no evidence of PNH  KWESI IFA Screen: Negative  HbSAg negative, HCV Ab nonreactive, Hep B surface Ab reactive,  HIV negative, Hep B Core Ab nonreactive  CT C/A/P with stable small RUL pulmonary nodule 5 mm, nodularity on liver (6 x 5 mm) sable from last time, and nabeel hepatis lymph node 18 x 11 mm decreased in size from previous, portal caval lymph node 25 x 14 mm (stable since previous study), aortocaval lymph node 5 x 5 mm stable    BMBx 1/16/25   Small monotypic lymphoplasmacytic infiltrate, consistent with IgM monoclonal gammopathy of undetermined significance.  Background hypercellular bone marrow with multilineage hematopoiesis,  erythroid predominance, and mild megaloblastoid change.    Cytogenetics: normal karyotype  Special stains: negative for congo red stain.     1/23/25: I spoke to our pathologist Dr. Lucas in detail regarding this patient's Bone marrow biopsy results. She has < 5 % plasma cells in the bone marrow, excluding the diagnosis of multiple myeloma at this time. She  does have IgM MGUS with hypercellular bone marrow (50% cellular).  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 Gly1 361Arg, Frequency 24%    1/23/25: Started prednisone 70 mg PO every day (1mg/kg/daily) along with daily pantoprazole for stress ulcer prophylaxis.     1/28/25 Labs:  SPEP with monoclonal spike in beta region. Monoclonal IgM kappa. Kappa free light chains 2.752 , lambda 0.848, k/l ratio 3.25 (up from 3.02)  IgM decreased to 536.4 after initiating prednisone  Hemolysis labs improving (reticulocyte, LDH, haptoglobin)  proBNP elevated at 1677  High sensitivity troponin <3  EKG with ,  (mildly low)  2D echo: EF 65-70%, no mention of abnormal global longitudinal strain  TSH, prolcatin, estradiol, testosterone are normal -- making POEMS less likely given lack of endocrinopathy. Her fasting glucose is high because she is on a steroid course    2/10/25: Patient seen by Dr. Tejinder Pineda at [facility] for second opinion. He does not think patient has Waldenstrom's since cytopenias in WM are usually due to bulky marrow involvement --which patient does not have. I agree with his assessment.   Labs at [facility] with negative cold agglutinin titer, Kappa/lambda 2.47 (elevated ) with kappa 2.10 and lambda light chain 0.85, QI with IgM 366 (High)/IgA 36(low)/IgG 514 (low), SPEP with Monoclonal M-protein of 0.3 g/dL    Recommendations:   - Patient now has CCUS as documented above, given she has clear evidence of anemia, thrombocytopenia, and TET-2 mutation with frequency 24% (> 2%). This may be a sign of low-grade evolving MDS. It's unclear if this is a separate process from her high-intermediate risk IgM MGUS.   - Of note, the patient's IgM level did decrease and her thrombocytopenia has completely resolved after I started steroids.  Monitor  labs every 3 months: SPEP with PARIS, QI, kappa/lambda ratio (standing orders placed)  - Given her persistent bilateral finger neuropathy, I have referred to neurology for EMG. She is scheduled for EMG March 2025.   - I reviewed her further lab results above. I am not sure why her proBNP is so elevated in the absence of heart failure. She does not have any signs of organomegaly. Troponin, EKG, Echo are normal. Less likely amyloidosis  - Serum hyperviscosity normal  - PET-CT scan pending after completion of steroid taper. She will need tissue biopsy if there is any abnormal uptake/mass.    3. Hyperglycemia secondary to steroid administration  - Patient is on insulin to control blood sugars  - Following with endocrinology, I appreciate their recommendations      4. CKD stage 3a  - baseline Cr 1-1.3  - following with nephrology, I appreciate their recommendations      Follow-Up: Return to clinic in 1 week for repeat labs, visit with me, and C2D1 Rituximab.       Beth Ojeda D.O.  [facility]

## 2025-02-27 NOTE — PROGRESS NOTES
Pt here for C1D1 Drug(s)Truxima.  Arrives Ambulating independently, accompanied by Spouse     Patient was evaluated today by MD.    Oral medications included in this regimen:  yes - prednisone    Patient confirms comprehension of cancer treatment schedule:  yes    Pregnancy screening:  Not applicable    Modifications in dose or schedule:  No    Medications appearance and physical integrity checked by RN: yes.    Chemotherapy IV pump settings verified by 2 RNs:  No due to targeted therapy IV administration.  Frequency of blood return and site check throughout administration: Prior to administration     Infusion/treatment outcome:  patient tolerated treatment without incident    Education Record    Learner:  Patient and Spouse  Barriers / Limitations:  None  Method:  Brief focused and Discussion  Education / instructions given:  medications, side effects, plan of care  Outcome:  Shows understanding    Discharged Home, Ambulating independently, accompanied by:Spouse    Patient/family verbalized understanding of future appointments: by printed AVS

## 2025-02-28 ENCOUNTER — TELEPHONE (OUTPATIENT)
Age: 75
End: 2025-02-28

## 2025-02-28 NOTE — TELEPHONE ENCOUNTER
In [place]- tracking number generated for [employer].    [employer] message sent to patient.    Closing this encounter.

## 2025-02-28 NOTE — TELEPHONE ENCOUNTER
Toxicities: C1 D1 Rituximab-abbs on 2/27/2025    Kassi reports that she is feeling \"fine.\" No side effects at this time. I encouraged her to please call the office if she is not feeling well or she has any questions or concerns. She agreed and thanked me for checking on her.

## 2025-03-03 ENCOUNTER — PATIENT OUTREACH (OUTPATIENT)
Dept: CASE MANAGEMENT | Age: 75
End: 2025-03-03

## 2025-03-03 NOTE — PROGRESS NOTES
Transitions of Care Navigation  Discharge Date: 2/1/25  Contact Date: 3/3/2025    Transitions of Care Assessment:    BROOK Graduation Assessment    General:  Assessment completed with: Patient  Patient Subjective: Spoke with patient. Patient reports she has been doing ok. The patient reports she is continuing to monitor her blood sugar at home. The patient states she does feel her blood sugar is better controlled than before in the mornings and nightly. She no longer is having hypoglycemia in the morning as she was before. Continues to have high blood sugar readings in the afternoons. She continues to feel dizzy and shaky sometimes but denies this feeling any worse. No blurred vision. The patient states she is able to function doing her daily activities. No swelling in her extremities. She continues to taper down on the Prednisone and is on 20 mg now. She was originally on 70 mg. She is also no longer on Jardiance and this was a recent change for her. Blood sugar right now is at 118 and states this is the best she has been. The patient denies any new or worsening symptoms at this time. The patient states \"I have the tools now to manage this.\" The patient denies any questions or concerns.    Care Plan/Instructions:   Care Plan Summary (Recap of navigation period including # of ED & Hospital Admission, and if goals met or unmet): Initial ER visit 01/31/2025 w/ hyperglycemia- blood glucose greater than 600 at that time and prescribed insulin at discharge. Navigation period: 02/03/2025-03/03/2025 # of ER visits: 0 # of Hospital Admissions: 0. . Goals have been met therefore Transitions of Care will end at this time. Nurse Care Manager discussed with patient this will be the last outreach call and the patient verbalized agreement with this.  Patient Graduation Instructions (Ongoing barriers to care identified, Areas of Need, Areas of Progress): Barriers: on steroids. Areas of need/progress: Blood sugar more controlled now  and has been following closely with Endocrinology for further care and insulin management . No longer having episodes of hypoglycemia. Continues to taper down on the Prednisone. follow up with Endo 04/08/2025 or sooner if needed. Continue to monitor blood sugar closely.     Care Management/Programs:  Does the patient require further care management?: No      Medications:  Medication Reconciliation:  I am aware of an inpatient discharge within the last 30 days.  The discharge medication list has been reconciled with the patient's current medication list and reviewed by me. See medication list for additions of new medication, and changes to current doses of medications and discontinued medications.  Current Outpatient Medications   Medication Sig Dispense Refill    insulin aspart 100 UNIT/ML Subcutaneous Solution Inject into the skin 2 (two) times daily before meals.      pantoprazole 40 MG Oral Tab EC Take 1 tablet (40 mg total) by mouth daily. 30 tablet 1    predniSONE 20 MG Oral Tab Take 1.5 tablets (30 mg total) by mouth daily.      Insulin NPH, Human,, Isophane, (HUMULIN N KWIKPEN) 100 UNIT/ML Subcutaneous Suspension Pen-injector Inject 11 Units into the skin every morning. Take NPH as directed for steroid dose, start with 20 units. Take it with prednisone in the morning      atorvastatin 20 MG Oral Tab Take 1 tablet (20 mg total) by mouth daily. 90 tablet 3    TRUEPLUS 5-BEVEL PEN NEEDLES 32G X 4 MM Does not apply Misc 1 each As Directed. 100 each 3    Alcohol Swabs (ALCOHOL PREP) Does not apply Pads Each time with insulin injection. Ok to dispense any insurance preferred, in stock option 300 each 2    TRUEPLUS INSULIN SYRINGE 31G X 5/16\" 0.3 ML Does not apply Misc 1 each daily.      empagliflozin (JARDIANCE) 25 MG Oral Tab Take 1 tablet (25 mg total) by mouth daily. 90 tablet 3    Cyanocobalamin (VITAMIN B 12 OR) Take by mouth daily.      FOLIC ACID OR Take by mouth daily.      triamcinolone 0.1 % External  Cream Apply topically as needed.      losartan 100 MG Oral Tab Take 1 tablet (100 mg total) by mouth every evening. 90 tablet 3    Glucose Blood (ONETOUCH VERIO) In Vitro Strip E11.69 use to check sugars twice daily 100 strip 11    Mometasone Furoate 0.1 % External Cream Apply to AA BID x 2 weeks, then PRN 45 g 2    cetirizine 10 MG Oral Tab Take 1 tablet (10 mg total) by mouth daily.         Follow-up Appointments:  Your appointments       Date & Time Appointment Department (Center)    Mar 06, 2025 9:30 AM CST Infusion Visit with NP [place] RN4 [facility] Infusion [facility] [place] ([facility])        Mar 06, 2025 10:00 AM CST Follow-Up Visit with Beth Ojeda DO [facility] Hematology Oncology [facility])        Mar 14, 2025 12:00 PM CDT EMG 2 LIMB with Nicho Bey MD [facility] (EMG [place])        Apr 08, 2025 10:00 AM CDT Follow Up Visit with Sudarshan Atkinson MD [facility] ([facility])        Apr 28, 2025 11:00 AM CDT Screening Mammogram 3D [place] with BOOKER [facility] ([facility])    You may be subject to a fee if you do not show up for your appointment or you cancel within 24 hours of your appointment.    Please arrive 15 minutes prior to your appointment.    If breastfeeding, pump or breastfeed one hour prior to your appointment time.    Continuous glucose monitors must be removed so the appointment should be scheduled near the normal replacement date.    It is important to bring your previous mammography films to your appointment so that the radiologist has previous images to compare this exam to. Having your previous mammograms on file helps the radiologist notice subtle changes in your breast tissue that can alert us to a need for further studies. Without these images, the  radiologist will not be able to detect the subtle changes and your final reading will be delayed until we receive them.    It is important that the annual screening mammogram be scheduled at least a year and a day after your last screening mammogram to ensure your insurance plan will cover the cost, unless you are experiencing breast related symptoms.    Do NOT use perfumes, deodorant, talcum powder, lotions, or creams on your breasts or underarms. They leave a coating that may be picked up by the x-rays, thereby distorting the mammogram.    Wear a two piece outfit the day of the exam. This allows you to be more comfortable during the exam.      May 20, 2025 1:40 PM CDT Follow Up Visit with Nicho Bey MD [facility] ([facility] Mammography [facility]  [address]  [facility] [address]  [phone] [address], [facility] [address]  [facility] [address]  [phone] [facility]  [address]  [facility] [id number] [phone]    [facility] Hematology [facility]  [address]  [facility] [address]  [phone] [facility] Infusion Center [facility]  [address]  [facility] [address]  [phone]

## 2025-03-06 ENCOUNTER — OFFICE VISIT (OUTPATIENT)
Age: 75
End: 2025-03-06
Attending: INTERNAL MEDICINE
Payer: MEDICARE

## 2025-03-06 VITALS
DIASTOLIC BLOOD PRESSURE: 75 MMHG | OXYGEN SATURATION: 99 % | HEART RATE: 81 BPM | SYSTOLIC BLOOD PRESSURE: 152 MMHG | WEIGHT: 137.63 LBS | RESPIRATION RATE: 16 BRPM | BODY MASS INDEX: 25.99 KG/M2 | HEIGHT: 61.1 IN | TEMPERATURE: 97 F

## 2025-03-06 DIAGNOSIS — D59.11 WARM AUTOIMMUNE HEMOLYTIC ANEMIA (HCC): ICD-10-CM

## 2025-03-06 DIAGNOSIS — D59.11 WARM AUTOIMMUNE HEMOLYTIC ANEMIA (HCC): Primary | ICD-10-CM

## 2025-03-06 LAB
ALBUMIN SERPL-MCNC: 4.3 G/DL (ref 3.2–4.8)
ALBUMIN/GLOB SERPL: 2.2 {RATIO} (ref 1–2)
ALP LIVER SERPL-CCNC: 68 U/L
ALT SERPL-CCNC: 28 U/L
ANION GAP SERPL CALC-SCNC: 10 MMOL/L (ref 0–18)
AST SERPL-CCNC: 14 U/L (ref ?–34)
BASOPHILS # BLD: 0 X10(3) UL (ref 0–0.2)
BASOPHILS NFR BLD: 0 %
BILIRUB SERPL-MCNC: 0.8 MG/DL (ref 0.2–1.1)
BUN BLD-MCNC: 33 MG/DL (ref 9–23)
CALCIUM BLD-MCNC: 9.4 MG/DL (ref 8.7–10.6)
CHLORIDE SERPL-SCNC: 106 MMOL/L (ref 98–112)
CO2 SERPL-SCNC: 26 MMOL/L (ref 21–32)
CREAT BLD-MCNC: 1.21 MG/DL
EGFRCR SERPLBLD CKD-EPI 2021: 48 ML/MIN/1.73M2 (ref 60–?)
EOSINOPHIL # BLD: 0 X10(3) UL (ref 0–0.7)
EOSINOPHIL NFR BLD: 0 %
ERYTHROCYTE [DISTWIDTH] IN BLOOD BY AUTOMATED COUNT: 13.3 %
GLOBULIN PLAS-MCNC: 2 G/DL (ref 2–3.5)
GLUCOSE BLD-MCNC: 278 MG/DL (ref 70–99)
HCT VFR BLD AUTO: 37.2 %
HGB BLD-MCNC: 12.5 G/DL
LYMPHOCYTES NFR BLD: 1.14 X10(3) UL (ref 1–4)
LYMPHOCYTES NFR BLD: 12 %
MCH RBC QN AUTO: 31.6 PG (ref 26–34)
MCHC RBC AUTO-ENTMCNC: 33.6 G/DL (ref 31–37)
MCV RBC AUTO: 93.9 FL
MONOCYTES # BLD: 0.67 X10(3) UL (ref 0.1–1)
MONOCYTES NFR BLD: 7 %
MORPHOLOGY: NORMAL
NEUTROPHILS # BLD AUTO: 7.66 X10 (3) UL (ref 1.5–7.7)
NEUTROPHILS NFR BLD: 81 %
NEUTS HYPERSEG # BLD: 7.7 X10(3) UL (ref 1.5–7.7)
OSMOLALITY SERPL CALC.SUM OF ELEC: 311 MOSM/KG (ref 275–295)
PLATELET # BLD AUTO: 292 10(3)UL (ref 150–450)
PLATELET MORPHOLOGY: NORMAL
POTASSIUM SERPL-SCNC: 4.2 MMOL/L (ref 3.5–5.1)
PROT SERPL-MCNC: 6.3 G/DL (ref 5.7–8.2)
RBC # BLD AUTO: 3.96 X10(6)UL
SODIUM SERPL-SCNC: 142 MMOL/L (ref 136–145)
TOTAL CELLS COUNTED BLD: 100
WBC # BLD AUTO: 9.5 X10(3) UL (ref 4–11)

## 2025-03-06 RX ORDER — PREDNISONE 2.5 MG/1
5 TABLET ORAL DAILY
Qty: 58 TABLET | Refills: 0 | Status: SHIPPED | OUTPATIENT
Start: 2025-03-06 | End: 2025-04-04

## 2025-03-06 RX ORDER — METHYLPREDNISOLONE SODIUM SUCCINATE 125 MG/2ML
100 INJECTION INTRAMUSCULAR; INTRAVENOUS ONCE
Status: COMPLETED | OUTPATIENT
Start: 2025-03-06 | End: 2025-03-06

## 2025-03-06 RX ORDER — DIPHENHYDRAMINE HYDROCHLORIDE 50 MG/ML
50 INJECTION INTRAMUSCULAR; INTRAVENOUS ONCE
Status: COMPLETED | OUTPATIENT
Start: 2025-03-06 | End: 2025-03-06

## 2025-03-06 RX ORDER — ACETAMINOPHEN 325 MG/1
650 TABLET ORAL ONCE
Status: COMPLETED | OUTPATIENT
Start: 2025-03-06 | End: 2025-03-06

## 2025-03-06 RX ADMIN — ACETAMINOPHEN 650 MG: 325 TABLET ORAL at 10:03:00

## 2025-03-06 RX ADMIN — METHYLPREDNISOLONE SODIUM SUCCINATE 100 MG: 125 INJECTION INTRAMUSCULAR; INTRAVENOUS at 10:03:00

## 2025-03-06 RX ADMIN — DIPHENHYDRAMINE HYDROCHLORIDE 50 MG: 50 INJECTION INTRAMUSCULAR; INTRAVENOUS at 10:03:00

## 2025-03-06 NOTE — PROGRESS NOTES
Education Record    Learner:  Patient and Spouse    Disease / Diagnosis: Truxima    Barriers / Limitations:  None   Comments:    Method:  Brief focused   Comments:    General Topics:  Side effects and symptom management   Comments:    Outcome:  Shows understanding   Comments:

## 2025-03-06 NOTE — PROGRESS NOTES
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  Based on selective gating, there is a small, aberrant population of bright CD38 positive, CD56 positive plasma cells ( less than 20 events) that shows a marked kappa cytoplasmic immunoglobulin light predominance.      Differential considerations for the small monotypic B-cell and plasma cell infiltrates in the setting of IgM kappa gammopathy include IgM monoclonal gammopathy of undetermined significance and focal bone marrow involvement by a lymphoplasmacytic lymphoma.  The histologic features favor IgM gammopathy of undetermined significance.  Clinical and radiographic correlation is suggested.           Differential considerations for the hypercellular bone marrow with erythroid predominance and mild megaloblastoid change include reactive conditions (nutritional deficiency, toxins, autoimmune conditions, infection, or hemolysis) as well as an evolving low-grade myelodysplastic syndrome.  Based on morphology, a reactive etiology is favored.  Correlation with clinical findings and pending conventional cytogenetic studies/ molecular studies is suggested.     Dr. Goldberg has reviewed the case and concurs.         Ancillary Studies      Bone Marrow  Microscopic Description:  CBC: January 6, 2025  WBC (103/ul) 7.4   RBC (106/ul) 2.38   HGB (gm/dl) 8.4   HCT (%) 25   PLATELET (103/uL) 54   MCV (fL) 105   MCH (pg) 35.3   MCHC (gm/dL) 33.6   RDW (%) 21.9   Neutrophils (103/uL) 5.07   Lymphocytes (103/uL) 0.85   Monocytes (103/uL) 0.40   Eosinophils (103/uL) 0.68   Basophils (103/uL) 0.12      Peripheral Blood Smear Interpretation: A recent peripheral blood smear is not available for review at the time of diagnosis.  Bone Marrow Aspirate, Clot, Biopsy And Touch Preps:   Adequacy: The bone marrow aspirate smears are cellular and adequate for evaluation.  The core biopsy consists of trabecular bone and adequate bone marrow.  Aspirate Differential (Percent of 500-cell count):      Granulocytes 41   Blasts <1   Normoblasts 52   Lymphocytes 5   Monocytes <1   Eosinophils 2   Basophils <1   Plasma Cells <1      Touch Preps: The touch preparations contain myeloid, erythroid and megakaryocytic elements  Clot: The particle clot section consists of cellular particles with myeloid erythroid and megakaryocytic elements  Cellularity:  ASPIRATE: Active  BIOPSY %: The core biopsy is hypercellular for age with an estimated cellularity of 50%.  Erythroid:  CELLULARITY: Increased  MORPHOLOGY: The erythroid series shows mild megaloblastoid changes.  Myeloid:  CELLULARITY: Adequate to slightly increased  MORPHOLOGY: The myeloid series shows progressive maturation.  Blasts are not increased  Megakaryocyte:  CELLULARITY: Adequate  MORPHOLOGY: The majority of megakaryocytes are unremarkable.  Rare small hypolobated megakaryocytes are noted  Lymphocytes: Lymphocytes are small and mature appearing.  There are small well-circumscribed nonparatrabecular lymphoid aggregates composed of small mature appearing lymphocytes.  Plasma Cells: Plasma cells are not significantly increased.  The plasma cells are small with condensed chromatin.  Based on immunohistochemistry, the plasma cells comprise  less than 5% of bone marrow cellularity.  Sheets of plasma cells are not identified.  Bony Trabeculae: Unremarkable  Immunohistochemistry and Special Stains:     Immunohistochemistry:     Material:  Block A1/B1  Population:  Tissue     Antibody                          Result  CD3                                   See Comment  CD20                                See Comment  CD34                                See Comment                                See Comment                                    See Comment  Cyclin D1                         See Comment           Medical Necessity    Immunohistochemical stains were performed:                  See Comment                   Positive tissue controls were utilized in the staining process.  These slides were reviewed by the signout Pathologist and showed appropriate staining results.     Interpreted by:  Juan Lucas MD     Methodology:         Immunohistochemical stains are performed on formalin-fixed, paraffin-embedded tissue sections.  Deparaffinization, antigen retrieval, and staining utilizes the automated Leica Goode III immunohistochemistry platform.  A proprietary, non-biotin, polymer-based detection system (Bond Polymer Refine DetectionTM ) is employed.  All antibodies are validated by [facility] to document appropriate staining reactions.  Positive controls are utilized and show appropriate reactivity.     Special Stain(s):     Material:  Block A and Aspirate   Population:  Tissue     Stain                                Result  Iron                                    See Comment   Retic                                 See Comment                                              Positive tissue controls were utilized in the staining process.  These slides were reviewed by the signout Pathologist and showed appropriate staining results.     Interpreted by: Juan Lucas MD            Impression & Plan: Mrs. Childress is a 73 y/o  F with PMHx of mildly elevated liver tests (+ smooth muscle antibody), HTN, HLD, Type 2 DM with albuminuria, basal cell carcinoma of skin of face s/p resection who presents for evaluation of anemia and thrombocytopenia. She was found to have IgM MGUS, CCUS, and warm autoimmune hemolytic anemia.       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   - hemolysis labs 1/7/25: reticulocyte % 16.5, retic absolute 405 (very elevated),  (elevated), Blood bank Antibody screen positive with Warm IgG antibodies. ALMA positive for poly & IgG (+4); negative for C3D, Tbili 3.3 with indirect bili 2.2, haptoglobin < 10  - labs 1/23/25: , tbili 6.2 , indirect bilirubin 5.6, haptoglobin < 10, Hb 7.7 g/dL, hematocrit 22.1, plt 145 (improved from 54), retic 13.6% and absolute retic 301, ALMA Poly positive, IgG positive, C3D Negative  - 1/23/25: started prednisone 70 mg daily (1 mg/kg/daily) along with GI stress ulcer prophylaxis  - 2/10/25: She was seen at [facility] for second opinion on 2/10/25 by Dr. Tejinder Pineda (hematology/oncology), who agreed with my workup and management. He did have a suggestion to add 4-week course of rituximab, since it has been associated with longer chances of remission.  She has follow up with Dr. Pineda in April 2025. I greatly appreciate his input.  - 2/27/25: C1D1 Rituximab 375 mg/m2  - 3/6/25: C1D8 Rituximab 375 mg/m2    Recommendations:    TODAY 2/27/25:  - Labs today show significantly improved Hb at 12.5 g/dL. Anemia has resolved her hemolysis labs today are normal. Her energy is significantly improved and she feels better, overall. Continue steroid taper as below:  - TAPER steroids as follows:  1/31 - 2/6: prednisone 60 mg daily  2/7 - 2/13: prednisone 50 mg daily  2/14- 2/20: prednisone 40 mg daily  2/21- 2/27: prednisone 30 mg  daily  2/28 - 3/6: prednisone 20 mg daily  3/7 - 3/13: prednisone 10 mg daily  3/14 - 3/20: prednisone 7.5 mg daily  3/21 - 3/27: prednisone 5.0 mg daily  3/28 - 4/3: prednisone 2.5 mg daily  4/4/2025: OFF steroids. Can discontinue PPI if no GERD or GI symptoms    - Per literature, 4-week course of rituximab 375 mg/m2 weekly in conjunction with steroids in the [facility] has shown to improve complete remission rates. Hep B profile normal. Labs adequate for treatment. Proceed with C2D1 rituximab today.       IgM Monoclonal Gammopathy of Undetermined Significance (IgM MGUS), High-Intermediate risk MGUS -- Improved with steroids           Clonal Cytopenia of Undetermined Significance (CCUS) diagnosed 1/29/25 -- given anemia, thrombocytopenia, and TET2 mutation    Severe Fatigue (Improved), Peripheral Neuropathy, Postural Hypotension    Differential diagnosis: Lymphoplasmacytic lymphoma, Waldenstrom's macroglobulinemia, non-secretory myeloma, evolving low-grade MDS.    Workup:  Labs 1/7/25   Blood counts:  Hb 8.4, hematocrit 25, plt 54 k, , absolute lymphocytes 850   Citrated platelet count 79.2. confirms true thrombocytopenia with plt < 150k  , uric acid 5.9  SPEP with PARIS: kappa free light chain 6.589, lambda normal 2.1, kappa/lambda ratio 3.02, beta globulins 1.25. Monoclonal IgM kappa seen (monoclonal spike in beta region)   QI: IgA 81.2, IgG 914, IgM 917.2 (elevated)  Beta-2 microglobulin: 5.97  Cryoglobulins: none detected  [facility] panel: no evidence of PNH  KWESI IFA Screen: Negative  HbSAg negative, HCV Ab nonreactive, Hep B surface Ab reactive,  HIV negative, Hep B Core Ab nonreactive  CT C/A/P with stable small RUL pulmonary nodule 5 mm, nodularity on liver (6 x 5 mm) sable from last time, and nabeel hepatis lymph node 18 x 11 mm decreased in size from previous, portal caval lymph node 25 x 14 mm (stable since previous study), aortocaval lymph node 5 x 5 mm stable    BMBx  1/16/25   Small monotypic lymphoplasmacytic infiltrate, consistent with IgM monoclonal gammopathy of undetermined significance.  Background hypercellular bone marrow with multilineage hematopoiesis,  erythroid predominance, and mild megaloblastoid change.    Cytogenetics: normal karyotype  Special stains: negative for congo red stain.     1/23/25: I spoke to our pathologist Dr. Lucas in detail regarding this patient's Bone marrow biopsy results. She has < 5 % plasma cells in the bone marrow, excluding the diagnosis of multiple myeloma at this time. She does have IgM MGUS with hypercellular bone marrow (50% cellular).  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 Gly1 361Arg, Frequency 24%    1/23/25: Started prednisone 70 mg PO every day (1mg/kg/daily) along with daily pantoprazole for stress ulcer prophylaxis.     1/28/25 Labs:  SPEP with monoclonal spike in beta region. Monoclonal IgM kappa. Kappa free light chains 2.752 , lambda 0.848, k/l ratio 3.25 (up from 3.02)  IgM decreased to 536.4 after initiating prednisone  Hemolysis labs improving (reticulocyte, LDH, haptoglobin)  proBNP elevated at 1677  High sensitivity troponin <3  EKG with ,  (mildly low)  2D echo: EF 65-70%, no mention of abnormal global longitudinal strain  TSH, prolcatin, estradiol, testosterone are normal -- making POEMS less likely given lack of endocrinopathy. Her fasting glucose is high because she is on a steroid course    2/10/25: Patient seen by Dr. Tejinder Pineda at [facility] for second opinion. He does not think patient has Waldenstrom's since cytopenias in WM are usually due to bulky marrow involvement --which patient does not have. I agree with his assessment.   Labs at [facility] with negative cold agglutinin titer, Kappa/lambda 2.47 (elevated ) with kappa  2.10 and lambda light chain 0.85, QI with IgM 366 (High)/IgA 36(low)/IgG 514 (low), SPEP with Monoclonal M-protein of 0.3 g/dL    Recommendations:   - Patient now has CCUS as documented above, given she has clear evidence of anemia, thrombocytopenia, and TET-2 mutation with frequency 24% (> 2%). This may be a sign of low-grade evolving MDS. It's unclear if this is a separate process from her high-intermediate risk IgM MGUS.   - Of note, the patient's IgM level did decrease and her thrombocytopenia has completely resolved after I started steroids.  Monitor labs every 3 months: SPEP with PARIS, QI, kappa/lambda ratio (next due ~4/6/25)  - Given her persistent bilateral finger neuropathy, I have referred to neurology for EMG. She is scheduled for EMG March 2025.   - I reviewed her further lab results above. I am not sure why her proBNP is so elevated in the absence of heart failure. She does not have any signs of organomegaly. Troponin, EKG, Echo are normal. Less likely amyloidosis  - Serum hyperviscosity normal  - PET-CT scan pending after completion of steroid taper. She will need tissue biopsy if there is any abnormal uptake/mass.    3. Hyperglycemia secondary to steroid administration  - Patient is on insulin to control blood sugars  - Following with endocrinology, I appreciate their recommendations      4. CKD stage 3a  - baseline Cr 1-1.3  - following with nephrology, I appreciate their recommendations      Follow-Up: Return to clinic in 1 week for repeat labs, visit with me, and C1D15 Rituximab.       Beth Ojeda D.O.  [facility]

## 2025-03-06 NOTE — PROGRESS NOTES
Pt here for weekly Rituximab, second dose. Pt is on 20mg of Prednisone daily, she will start 10mg tomorrow. Continuing to manage her blood sugars. She had minimal fatigue after first infusion, no fevers, chills, SOB reported. Inquiring what follow up will look like after her fourth infusion.          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

## 2025-03-11 ENCOUNTER — PROCEDURE VISIT (OUTPATIENT)
Dept: NEUROLOGY | Facility: CLINIC | Age: 75
End: 2025-03-11
Payer: MEDICARE

## 2025-03-11 DIAGNOSIS — D47.2 MGUS (MONOCLONAL GAMMOPATHY OF UNKNOWN SIGNIFICANCE): ICD-10-CM

## 2025-03-11 DIAGNOSIS — R20.0 NUMBNESS IN FEET: ICD-10-CM

## 2025-03-11 DIAGNOSIS — R20.8 DECREASED VIBRATORY SENSE: ICD-10-CM

## 2025-03-11 PROCEDURE — 95910 NRV CNDJ TEST 7-8 STUDIES: CPT | Performed by: OTHER

## 2025-03-11 PROCEDURE — 95886 MUSC TEST DONE W/N TEST COMP: CPT | Performed by: OTHER

## 2025-03-11 NOTE — PROCEDURES
[facility]  [address], [place] [address]   [phone]        Full Name: YONG SANCHEZ Gender: Female  Patient ID: [id number] YOB: 1950      Visit Date: 3/11/2025 8:28 AM  Age: 74 Years  Examining Physician: ELIANE NGUYEN MD  Height: 5 feet 1 inch  Weight: 137 lbs  History:     Focused HPI  This is a 74 year old female who presents for evaluation of numbness and tingling in feet. She ahs been having this for the past year but notes in the past 6 months she has been feeling like she has \"balls of cotton\" under her foot on both feet. She admits to history of knee surgeyr and ankle surgery on left leg. She denies tingling/numbness in hands but has noted some \"reynaud's phenomenon\" symptoms where her fingers will change color in the cold; this has been in the past few months since November 2024.   Of note, she had been having pain in the back for ~ 2 weeks and had workup which showed protein in her urine.  She denies signifcant pain in her feet and denies tripping over feet or dropping objects from hands.     She has been following with hematology, Dr. Maradiaga.and was found to have MGUS and was recommended to see neurology for evaluation of possible neuropathy.       She denies known family history of neuropathy; sister has essential thrombocytopenia and family history of multiple myeloma in mother.        Neurologic exam demonstrates mild decreased sensation to vibration distal LE at great toes and in the thumbs in the upper extremities as well as mildly off balance tandem gait. With her history of recently noted MGUS, patient may have neuropathy as signs and symptoms are suggestive. However, in order to evaluate for type/severity of possible large fiber neuropathy, NCS/EMG R arm/ leg requested as she has some decreased vibratory sensation in feet and hands.     Focused Exam  Motor: 5/5 strength throughout  DTR:  Absent R achilles, trace L  achilles, otherwise, 2+ symmetric throughout, toes downgoing bilaterally; no clonus  Sensory:   Pin is reduced up to ankles bilaterally in LE  Vibration is reduced at great toes bilaterally ~4 sec on both sides; ~8 sec at thumbs in UE as well      Sensory NCS      Nerve / Sites Rec. Site Onset Lat Peak Lat NP Amp PP Amp Segments Distance Velocity Comment     ms ms µV µV  cm m/s    R Median - Dig II (Antidromic)      Wrist Index 2.81 3.80 33.6 57.7 Wrist - Index 14 50       Ref.  <=3.30 <=4.00 >=7.0 >=8.0 Ref.      R Ulnar - Dig V (Antidromic)      Wrist Dig V 2.40 3.23 30.7 49.6 Wrist - Dig V 12 50       Ref.  <=3.10 <=4.00 >=5.0 >=4.0 Ref.      R Radial - Superficial (Antidromic)      Forearm Wrist 1.77 2.29 25.8 19.1 Forearm - Wrist 10 56       Ref.  <=2.20 <=2.80 >=7.0 >=11.0 Ref.      R Sural - (Antidromic)      Calf Ankle NR NR NR NR Calf - Ankle 14 NR       Ref.  <=3.60 <=4.50 >=4.0 >=4.0 Ref.         2 Ankle 3.39 4.06 4.6 6.6           Motor NCS      Nerve / Sites Muscle Latency Amplitude Segments Dist. Lat Diff Velocity Comments     ms mV  cm ms m/s    R Median - APB      Wrist APB 3.44 8.2 Wrist - APB 7         Ref.  <=4.40 >=3.8 Ref.          Elbow APB 7.52 7.6 Elbow - Wrist 21.5 4.08 52.7       Ref.    Ref.   >=51.0    R Ulnar - ADM      Wrist ADM 2.48 9.2 Wrist - ADM 7         Ref.  <=3.70 >=7.9 Ref.          B.Elbow ADM 6.27 6.2 B.Elbow - Wrist 21 3.79 55.4       Ref.    Ref.   >=52.0       A.Elbow ADM 7.73 6.0 A.Elbow - B.Elbow 10 1.46 68.6       Ref.    Ref.   >=43.0    R Peroneal - EDB      Ankle EDB 5.75 2.0 Ankle - EDB 7         Ref.  <=6.50 >=1.1 Ref.          B. Fib Head EDB 13.06 2.0 B. Fib Head - Ankle 29 7.31 39.7       Ref.    Ref.   >=39.0    R Tibial - AH      Ankle AH 4.50 10.2 Ankle - AH 7         Ref.  <=6.10 >=1.1 Ref.          Knee AH 13.10 11.4 Knee - Ankle 36 8.60 41.8       Ref.    Ref.   >=40.0        F  Wave      Nerve M Latency F Latency    ms ms   R Peroneal - EDB 6.4 51.6    Ref.  <=55.0   R Tibial - AH 6.1 59.9   Ref.  <=56.0   R Median - APB 3.3 25.7   Ref.  <=28.6   R Ulnar - ADM 2.9 26.4   Ref.  <=28.8       EMG Summary Table     Spontaneous MUAP Recruitment   Muscle IA Fib PSW Fasc H.F. Amp Dur. PPP Pattern   R. Deltoid N None None None None N N N N   R. Biceps brachii N None None None None N N N N   R. Triceps brachii N None None None None N N N N   R. Extensor digitorum communis N None None None None N N N N   R. First dorsal interosseous N None None None None N N N N   R. Vastus medialis N None None None None N N N N   R. Peroneus longus N None None None None N N N N   R. Tibialis anterior N None None None None N N N N   R. Gastrocnemius N None None None None N N N N   R. Extensor hallucis longus N None None None None N N N N       Summary    Nerve conduction studies:  Right sural sensory response was abnormal due to reduced amplitude, with normal peak latency, and normal conduction velocity.  Right median sensory response was normal.  Right ulnar sensory response was normal.  Right radial sensory response was normal.  Right common peroneal motor response was normal; F-wave response was normal.  Right tibial motor response was normal; F-wave response was mildly prolonged.   Right median motor response was normal; F-wave response was normal.  Right ulnar motor response was normal; F-wave response was normal.    EMG (needle exam):  Concentric needle EMG study was normal in all 10 tested muscles: R. Deltoid, R. Biceps brachii, R. Triceps brachii, R. Extensor digitorum communis, R. First dorsal interosseous, R. Vastus medialis, R. Peroneus longus, R. Tibialis anterior, R. Gastrocnemius, R. Extensor hallucis longus.        Conclusion:   This is a mildly abnormal study, due to isolated reduced amplitude of the right sural sensory response.  This may be a normal variant in this age group or could suggest a mild sensory neuropathy.  There is, however, no evidence for a widespread  polyneuropathy affecting upper and lower extremities and no suggestion for a primary demyelinating neuropathy, or atypical neuropathy, or myopathy.  Of note, testing was somewhat limited by electrical artifact as well; clinical correlation is advised as to the etiology of these findings.     Nicho Bey MD, Neurology  [facility]  Pager [phone]  3/11/2025

## 2025-03-12 ENCOUNTER — TELEPHONE (OUTPATIENT)
Facility: CLINIC | Age: 75
End: 2025-03-12

## 2025-03-12 NOTE — TELEPHONE ENCOUNTER
Spoke with pt  Reviewed her BG   Pt down to prednisone 10 mg, taking NPH 10 units QAM, using aspart 3/8/9 + SSI today, self adjusting BG based on trends        Recommendations:  -prednisone : 10 mg - 10 units NPH 3/8/9 + SSI  (If hypoglycemia post prandial go down by 2 units for meal)  -prednisone: 7.5 mg - 7 units of NPH  3/8/9 + SSI   (If hypoglycemia post prandial go down by 2 units for meal)    Pt aware to call our office if BG uncontrolled, but looks like so far she has adjusted well    Sudarshan Atkinson MD  [facility]  3/12/2025

## 2025-03-13 ENCOUNTER — PATIENT MESSAGE (OUTPATIENT)
Dept: INTERNAL MEDICINE CLINIC | Facility: CLINIC | Age: 75
End: 2025-03-13

## 2025-03-13 ENCOUNTER — OFFICE VISIT (OUTPATIENT)
Age: 75
End: 2025-03-13
Attending: INTERNAL MEDICINE
Payer: MEDICARE

## 2025-03-13 VITALS
OXYGEN SATURATION: 100 % | BODY MASS INDEX: 26.62 KG/M2 | HEART RATE: 67 BPM | DIASTOLIC BLOOD PRESSURE: 79 MMHG | RESPIRATION RATE: 16 BRPM | SYSTOLIC BLOOD PRESSURE: 168 MMHG | TEMPERATURE: 98 F | WEIGHT: 141 LBS | HEIGHT: 61.1 IN

## 2025-03-13 DIAGNOSIS — D59.11 WARM AUTOIMMUNE HEMOLYTIC ANEMIA (HCC): Primary | ICD-10-CM

## 2025-03-13 DIAGNOSIS — D59.11 WARM AUTOIMMUNE HEMOLYTIC ANEMIA (HCC): ICD-10-CM

## 2025-03-13 DIAGNOSIS — R93.7 ABNORMAL BONE DENSITY SCREENING: Primary | ICD-10-CM

## 2025-03-13 DIAGNOSIS — Z79.52 ON PREDNISONE THERAPY: ICD-10-CM

## 2025-03-13 LAB
ALBUMIN SERPL-MCNC: 4.1 G/DL (ref 3.2–4.8)
ALBUMIN/GLOB SERPL: 2 {RATIO} (ref 1–2)
ALP LIVER SERPL-CCNC: 61 U/L
ALT SERPL-CCNC: 31 U/L
ANION GAP SERPL CALC-SCNC: 9 MMOL/L (ref 0–18)
AST SERPL-CCNC: 16 U/L (ref ?–34)
BASOPHILS # BLD: 0 X10(3) UL (ref 0–0.2)
BASOPHILS NFR BLD: 0 %
BILIRUB DIRECT SERPL-MCNC: 0.3 MG/DL (ref ?–0.3)
BILIRUB SERPL-MCNC: 0.9 MG/DL (ref 0.2–1.1)
BUN BLD-MCNC: 19 MG/DL (ref 9–23)
CALCIUM BLD-MCNC: 8.7 MG/DL (ref 8.7–10.6)
CHLORIDE SERPL-SCNC: 110 MMOL/L (ref 98–112)
CO2 SERPL-SCNC: 25 MMOL/L (ref 21–32)
CREAT BLD-MCNC: 1.01 MG/DL
EGFRCR SERPLBLD CKD-EPI 2021: 59 ML/MIN/1.73M2 (ref 60–?)
EOSINOPHIL # BLD: 0.16 X10(3) UL (ref 0–0.7)
EOSINOPHIL NFR BLD: 2 %
ERYTHROCYTE [DISTWIDTH] IN BLOOD BY AUTOMATED COUNT: 13.5 %
FASTING STATUS PATIENT QL REPORTED: NO
GLOBULIN PLAS-MCNC: 2.1 G/DL (ref 2–3.5)
GLUCOSE BLD-MCNC: 149 MG/DL (ref 70–99)
HAPTOGLOB SERPL-MCNC: 109 MG/DL (ref 30–200)
HCT VFR BLD AUTO: 38.6 %
HGB BLD-MCNC: 12.5 G/DL
HGB RETIC QN AUTO: 35.9 PG (ref 28.2–36.6)
IMM RETICS NFR: 0.09 RATIO (ref 0.1–0.3)
LDH SERPL L TO P-CCNC: 256 U/L
LYMPHOCYTES NFR BLD: 0.7 X10(3) UL (ref 1–4)
LYMPHOCYTES NFR BLD: 9 %
MCH RBC QN AUTO: 30.8 PG (ref 26–34)
MCHC RBC AUTO-ENTMCNC: 32.4 G/DL (ref 31–37)
MCV RBC AUTO: 95.1 FL
METAMYELOCYTES # BLD: 0.08 X10(3) UL
METAMYELOCYTES NFR BLD: 1 %
MONOCYTES # BLD: 0.55 X10(3) UL (ref 0.1–1)
MONOCYTES NFR BLD: 7 %
MORPHOLOGY: NORMAL
NEUTROPHILS # BLD AUTO: 6.12 X10 (3) UL (ref 1.5–7.7)
NEUTROPHILS NFR BLD: 78 %
NEUTS BAND NFR BLD: 3 %
NEUTS HYPERSEG # BLD: 6.32 X10(3) UL (ref 1.5–7.7)
OSMOLALITY SERPL CALC.SUM OF ELEC: 303 MOSM/KG (ref 275–295)
PLATELET # BLD AUTO: 205 10(3)UL (ref 150–450)
PLATELET MORPHOLOGY: NORMAL
POTASSIUM SERPL-SCNC: 3.6 MMOL/L (ref 3.5–5.1)
PROT SERPL-MCNC: 6.2 G/DL (ref 5.7–8.2)
RBC # BLD AUTO: 4.06 X10(6)UL
RETICS # AUTO: 63.3 X10(3) UL (ref 22.5–147.5)
RETICS/RBC NFR AUTO: 1.6 %
SODIUM SERPL-SCNC: 144 MMOL/L (ref 136–145)
TOTAL CELLS COUNTED BLD: 100
WBC # BLD AUTO: 7.8 X10(3) UL (ref 4–11)

## 2025-03-13 RX ORDER — DIPHENHYDRAMINE HYDROCHLORIDE 50 MG/ML
50 INJECTION INTRAMUSCULAR; INTRAVENOUS ONCE
Status: COMPLETED | OUTPATIENT
Start: 2025-03-13 | End: 2025-03-13

## 2025-03-13 RX ORDER — METHYLPREDNISOLONE SODIUM SUCCINATE 125 MG/2ML
100 INJECTION INTRAMUSCULAR; INTRAVENOUS ONCE
Status: COMPLETED | OUTPATIENT
Start: 2025-03-13 | End: 2025-03-13

## 2025-03-13 RX ORDER — ACETAMINOPHEN 325 MG/1
650 TABLET ORAL ONCE
Status: COMPLETED | OUTPATIENT
Start: 2025-03-13 | End: 2025-03-13

## 2025-03-13 RX ADMIN — DIPHENHYDRAMINE HYDROCHLORIDE 50 MG: 50 INJECTION INTRAMUSCULAR; INTRAVENOUS at 10:10:00

## 2025-03-13 RX ADMIN — ACETAMINOPHEN 650 MG: 325 TABLET ORAL at 10:09:00

## 2025-03-13 RX ADMIN — METHYLPREDNISOLONE SODIUM SUCCINATE 100 MG: 125 INJECTION INTRAMUSCULAR; INTRAVENOUS at 10:15:00

## 2025-03-13 NOTE — PROGRESS NOTES
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 of bone marrow cellularity.  Sheets of plasma cells are not seen.  In situ hybridization for kappa and lambda demonstrates that the plasma cells are kappa light chain restricted.  Special stain for reticulin demonstrates no significant reticulin fibrosis.    Flow cytometry immunophenotyping studies were performed on the submitted bone marrow aspirate.  The lymphoid population is composed predominantly of T cells with occasional B cells and NK cells.  Although B cells comprise a minority of the lymphoid population, the B cells are monotypic based on kappa surface immunoglobulin light chain restriction.  The monotypic B cells are CD19 positive, CD20 positive, CD5 negative, and CD10 negative.  T cells show no significant antigen deletion with the markers assayed.  The CD4-CD8 ratio is unremarkable.  Based on selective gating, there is a small, aberrant population of bright CD38 positive, CD56 positive plasma cells ( less than 20 events) that shows a marked kappa cytoplasmic immunoglobulin light predominance.      Differential considerations for the small monotypic B-cell and plasma cell infiltrates in the setting of IgM kappa gammopathy include IgM monoclonal gammopathy of  undetermined significance and focal bone marrow involvement by a lymphoplasmacytic lymphoma.  The histologic features favor IgM gammopathy of undetermined significance.  Clinical and radiographic correlation is suggested.           Differential considerations for the hypercellular bone marrow with erythroid predominance and mild megaloblastoid change include reactive conditions (nutritional deficiency, toxins, autoimmune conditions, infection, or hemolysis) as well as an evolving low-grade myelodysplastic syndrome.  Based on morphology, a reactive etiology is favored.  Correlation with clinical findings and pending conventional cytogenetic studies/ molecular studies is suggested.     Dr. Goldberg has reviewed the case and concurs.         Ancillary Studies      Bone Marrow Microscopic Description:  CBC: January 6, 2025  WBC (103/ul) 7.4   RBC (106/ul) 2.38   HGB (gm/dl) 8.4   HCT (%) 25   PLATELET (103/uL) 54   MCV (fL) 105   MCH (pg) 35.3   MCHC (gm/dL) 33.6   RDW (%) 21.9   Neutrophils (103/uL) 5.07   Lymphocytes (103/uL) 0.85   Monocytes (103/uL) 0.40   Eosinophils (103/uL) 0.68   Basophils (103/uL) 0.12      Peripheral Blood Smear Interpretation: A recent peripheral blood smear is not available for review at the time of diagnosis.  Bone Marrow Aspirate, Clot, Biopsy And Touch Preps:   Adequacy: The bone marrow aspirate smears are cellular and adequate for evaluation.  The core biopsy consists of trabecular bone and adequate bone marrow.  Aspirate Differential (Percent of 500-cell count):      Granulocytes 41   Blasts <1   Normoblasts 52   Lymphocytes 5   Monocytes <1   Eosinophils 2   Basophils <1   Plasma Cells <1      Touch Preps: The touch preparations contain myeloid, erythroid and megakaryocytic elements  Clot: The particle clot section consists of cellular particles with myeloid erythroid and megakaryocytic elements  Cellularity:  ASPIRATE: Active  BIOPSY %: The core biopsy is hypercellular for age with an  estimated cellularity of 50%.  Erythroid:  CELLULARITY: Increased  MORPHOLOGY: The erythroid series shows mild megaloblastoid changes.  Myeloid:  CELLULARITY: Adequate to slightly increased  MORPHOLOGY: The myeloid series shows progressive maturation.  Blasts are not increased  Megakaryocyte:  CELLULARITY: Adequate  MORPHOLOGY: The majority of megakaryocytes are unremarkable.  Rare small hypolobated megakaryocytes are noted  Lymphocytes: Lymphocytes are small and mature appearing.  There are small well-circumscribed nonparatrabecular lymphoid aggregates composed of small mature appearing lymphocytes.  Plasma Cells: Plasma cells are not significantly increased.  The plasma cells are small with condensed chromatin.  Based on immunohistochemistry, the plasma cells comprise less than 5% of bone marrow cellularity.  Sheets of plasma cells are not identified.  Bony Trabeculae: Unremarkable  Immunohistochemistry and Special Stains:     Immunohistochemistry:     Material:  Block A1/B1  Population:  Tissue     Antibody                          Result  CD3                                   See Comment  CD20                                See Comment  CD34                                See Comment                                See Comment                                    See Comment  Cyclin D1                         See Comment           Medical Necessity    Immunohistochemical stains were performed:                  See Comment                   Positive tissue controls were utilized in the staining process.  These slides were reviewed by the signout Pathologist and showed appropriate staining results.     Interpreted by:  Juan Lucas MD     Methodology:         Immunohistochemical stains are performed on formalin-fixed, paraffin-embedded tissue sections.  Deparaffinization, antigen retrieval, and staining utilizes the automated Leica Goode III immunohistochemistry platform.  A proprietary, non-biotin,  polymer-based detection system (Bond Polymer Refine DetectionTM ) is employed.  All antibodies are validated by [facility] Department of Pathology to document appropriate staining reactions.  Positive controls are utilized and show appropriate reactivity.     Special Stain(s):     Material:  Block A and Aspirate   Population:  Tissue     Stain                                Result  Iron                                    See Comment   Retic                                 See Comment                                              Positive tissue controls were utilized in the staining process.  These slides were reviewed by the [facility] Pathologist and showed appropriate staining results.     Interpreted by: Juan Lucas MD            Impression & Plan: Mrs. Childress is a 73 y/o F with PMHx of mildly elevated liver tests (+ smooth muscle antibody), HTN, HLD, Type 2 DM with albuminuria, basal cell carcinoma of skin of face s/p resection who presents for evaluation of anemia and thrombocytopenia. She was found to have IgM MGUS, CCUS, and warm autoimmune hemolytic anemia.       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   - hemolysis labs 1/7/25: reticulocyte % 16.5, retic absolute 405 (very elevated),  (elevated), Blood bank Antibody screen positive with Warm IgG antibodies. ALMA positive for poly & IgG (+4); negative for C3D, Tbili 3.3 with indirect bili 2.2, haptoglobin < 10  - labs 1/23/25: , tbili 6.2 , indirect bilirubin 5.6, haptoglobin < 10, Hb 7.7 g/dL, hematocrit 22.1, plt 145 (improved from 54), retic 13.6% and absolute retic 301, ALMA Poly positive, IgG positive, C3D Negative  - 1/23/25: started prednisone 70 mg daily (1 mg/kg/daily) along with GI stress ulcer prophylaxis  - 2/10/25: She was seen at [facility] for second opinion on 2/10/25 by   Tejinder Pineda (hematology/oncology), who agreed with my workup and management. He did have a suggestion to add 4-week course of rituximab, since it has been associated with longer chances of remission.  She has follow up with Dr. Pineda in April 2025. I greatly appreciate his input.  - 2/27/25: C1D1 Rituximab 375 mg/m2  - 3/6/25: C1D8 Rituximab 375 mg/m2  - 3/13/25: C1D15 Rituximab 375 mg/m2    Recommendations:  TODAY 3/13/25:  - Labs today show significantly improved Hb at 12.5 g/dL. Anemia has resolved her hemolysis labs today are normal. Her energy is significantly improved and she feels better, overall. Continue steroid taper as below:  - TAPER steroids as follows:  1/31 - 2/6: prednisone 60 mg daily  2/7 - 2/13: prednisone 50 mg daily  2/14- 2/20: prednisone 40 mg daily  2/21- 2/27: prednisone 30 mg daily  2/28 - 3/6: prednisone 20 mg daily  3/7 - 3/13: prednisone 10 mg daily  3/14 - 3/20: prednisone 7.5 mg daily  3/21 - 3/27: prednisone 5.0 mg daily  3/28 - 4/3: prednisone 2.5 mg daily  4/4/2025: OFF steroids. Can discontinue PPI if no GERD or GI symptoms    - Per literature, 4-week course of rituximab 375 mg/m2 weekly in conjunction with steroids in the first-line treatment setting of WAIHA has shown to improve complete remission rates. Hep B profile normal. Labs adequate for treatment. Proceed with C2D15 rituximab today.       IgM Monoclonal Gammopathy of Undetermined Significance (IgM MGUS), High-Intermediate risk MGUS -- Improved with steroids           Clonal Cytopenia of Undetermined Significance (CCUS) diagnosed 1/29/25 -- given anemia, thrombocytopenia, and TET2 mutation    Severe Fatigue (Improved), Peripheral Neuropathy, Postural Hypotension    Differential diagnosis: Lymphoplasmacytic lymphoma, Waldenstrom's macroglobulinemia, non-secretory myeloma, evolving low-grade MDS.    Workup:  Labs 1/7/25   Blood counts:  Hb 8.4, hematocrit 25, plt 54 k, , absolute lymphocytes 850   Citrated  platelet count 79.2. confirms true thrombocytopenia with plt < 150k  , uric acid 5.9  SPEP with PARIS: kappa free light chain 6.589, lambda normal 2.1, kappa/lambda ratio 3.02, beta globulins 1.25. Monoclonal IgM kappa seen (monoclonal spike in beta region)   QI: IgA 81.2, IgG 914, IgM 917.2 (elevated)  Beta-2 microglobulin: 5.97  Cryoglobulins: none detected  [facility] panel: no evidence of PNH  KWESI IFA Screen: Negative  HbSAg negative, HCV Ab nonreactive, Hep B surface Ab reactive,  HIV negative, Hep B Core Ab nonreactive  CT C/A/P with stable small RUL pulmonary nodule 5 mm, nodularity on liver (6 x 5 mm) sable from last time, and nabeel hepatis lymph node 18 x 11 mm decreased in size from previous, portal caval lymph node 25 x 14 mm (stable since previous study), aortocaval lymph node 5 x 5 mm stable    BMBx 1/16/25   Small monotypic lymphoplasmacytic infiltrate, consistent with IgM monoclonal gammopathy of undetermined significance.  Background hypercellular bone marrow with multilineage hematopoiesis,  erythroid predominance, and mild megaloblastoid change.    Cytogenetics: normal karyotype  Special stains: negative for congo red stain.     1/23/25: I spoke to our pathologist Dr. Lucas in detail regarding this patient's Bone marrow biopsy results. She has < 5 % plasma cells in the bone marrow, excluding the diagnosis of multiple myeloma at this time. She does have IgM MGUS with hypercellular bone marrow (50% cellular).  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 Gly1 361Arg, Frequency 24%    1/23/25: Started prednisone 70 mg PO every day (1mg/kg/daily) along with daily pantoprazole for stress ulcer prophylaxis.     1/28/25 Labs:  SPEP with monoclonal spike in beta region. Monoclonal IgM kappa. Kappa free light chains 2.752 , lambda 0.848, k/l ratio 3.25 (up  from 3.02)  IgM decreased to 536.4 after initiating prednisone  Hemolysis labs improving (reticulocyte, LDH, haptoglobin)  proBNP elevated at 1677  High sensitivity troponin <3  EKG with ,  (mildly low)  2D echo: EF 65-70%, no mention of abnormal global longitudinal strain  TSH, prolcatin, estradiol, testosterone are normal -- making POEMS less likely given lack of endocrinopathy. Her fasting glucose is high because she is on a steroid course    2/10/25: Patient seen by Dr. Tejinder Pineda at [facility] for second opinion. He does not think patient has Waldenstrom's since cytopenias in WM are usually due to bulky marrow involvement --which patient does not have. I agree with his assessment.   Labs at [facility] with negative cold agglutinin titer, Kappa/lambda 2.47 (elevated ) with kappa 2.10 and lambda light chain 0.85, QI with IgM 366 (High)/IgA 36(low)/IgG 514 (low), SPEP with Monoclonal M-protein of 0.3 g/dL    Recommendations:   - Patient now has CCUS as documented above, given she has clear evidence of anemia, thrombocytopenia, and TET-2 mutation with frequency 24% (> 2%). This may be a sign of low-grade evolving MDS. It's unclear if this is a separate process from her high-intermediate risk IgM MGUS.   - Of note, the patient's IgM level did decrease and her thrombocytopenia has completely resolved after I started steroids.  Monitor labs every 3 months: SPEP with PARIS, QI, kappa/lambda ratio (next due ~4/6/25)  - Given her persistent bilateral finger neuropathy, I have referred to neurology for EMG.  She saw neurology recently, Dr. Bey on 3/12/25. EMG showed mildly abnormal study, due to isolated reduced amplitude of the right sural sensory response. He states \"here is, however, no evidence for a widespread polyneuropathy affecting upper and lower extremities and no suggestion for a primary demyelinating neuropathy, or atypical neuropathy, or myopathy.  Of note, testing was somewhat  limited by electrical artifact as well; clinical correlation is advised as to the etiology of these findings.\"   - I reviewed her further lab results above. I am not sure why her proBNP is so elevated in the absence of heart failure. She does not have any signs of organomegaly. Troponin, EKG, Echo are normal. Less likely amyloidosis  - Serum hyperviscosity normal  - PET-CT scan pending after completion of steroid taper. She will need tissue biopsy if there is any abnormal uptake/mass.    3. Hyperglycemia secondary to steroid administration  - Patient is on insulin to control blood sugars  - Following with endocrinology, I appreciate their recommendations      4. CKD stage 3a  - baseline Cr 1-1.3  - following with nephrology, I appreciate their recommendations      Follow-Up: Return to clinic in 1 week for repeat labs, visit with me, and C1D22 Rituximab.       Beth Ojeda D.O.  [facility]

## 2025-03-13 NOTE — PROGRESS NOTES
Pt here for weekly Rituximab, today is her third treatment. Pt to start prednisone 7.5mg tomorrow. She tolerated her last infusion well. 3-4 days post infusion, she felt an increase in her facial swelling, she states is has gone down today.         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

## 2025-03-13 NOTE — PROGRESS NOTES
Pt here for C15D1 Drug(s)Truxima.  Arrives Ambulating independently, accompanied by Spouse     Patient was evaluated today by MD.    Oral medications included in this regimen:  no    Patient confirms comprehension of cancer treatment schedule:  yes    Pregnancy screening:  Not applicable    Modifications in dose or schedule:  No    Medications appearance and physical integrity checked by RN: yes.    Chemotherapy IV pump settings verified by 2 RNs:  No due to targeted therapy IV administration.  Frequency of blood return and site check throughout administration: Prior to administration and At completion of therapy     Infusion/treatment outcome:  patient tolerated treatment without incident    Education Record    Learner:  Patient  Barriers / Limitations:  None  Method:  Discussion  Education / instructions given:  plan of care  Outcome:  Shows understanding    Discharged Home, Ambulating independently, accompanied by:Spouse    Patient/family verbalized understanding of future appointments: by [employer] messaging    Patient here for labs, MD, and treatment. Patient tolerated infusion without issue and left in stable condition. Future appointments in  place.

## 2025-03-14 NOTE — TELEPHONE ENCOUNTER
KS: Pended dexa scan for patient per MCM request/heme-onc recommendation. Unsure of which diagnosis association is appropriate, please review and sign as appropriate. TY

## 2025-03-20 ENCOUNTER — OFFICE VISIT (OUTPATIENT)
Age: 75
End: 2025-03-20
Attending: INTERNAL MEDICINE
Payer: MEDICARE

## 2025-03-20 ENCOUNTER — APPOINTMENT (OUTPATIENT)
Age: 75
End: 2025-03-20
Attending: INTERNAL MEDICINE
Payer: MEDICARE

## 2025-03-20 VITALS
WEIGHT: 141 LBS | HEIGHT: 61.1 IN | BODY MASS INDEX: 26.62 KG/M2 | HEART RATE: 76 BPM | SYSTOLIC BLOOD PRESSURE: 166 MMHG | DIASTOLIC BLOOD PRESSURE: 82 MMHG | OXYGEN SATURATION: 100 % | RESPIRATION RATE: 16 BRPM | TEMPERATURE: 97 F

## 2025-03-20 DIAGNOSIS — D47.2 IGM MONOCLONAL GAMMOPATHY OF UNCERTAIN SIGNIFICANCE: Primary | ICD-10-CM

## 2025-03-20 DIAGNOSIS — D59.11 WARM AUTOIMMUNE HEMOLYTIC ANEMIA (HCC): Primary | ICD-10-CM

## 2025-03-20 LAB
ALBUMIN SERPL-MCNC: 4.4 G/DL (ref 3.2–4.8)
ALBUMIN/GLOB SERPL: 2 {RATIO} (ref 1–2)
ALP LIVER SERPL-CCNC: 62 U/L
ALT SERPL-CCNC: 37 U/L
ANION GAP SERPL CALC-SCNC: 7 MMOL/L (ref 0–18)
AST SERPL-CCNC: 17 U/L (ref ?–34)
BASOPHILS # BLD: 0.11 X10(3) UL (ref 0–0.2)
BASOPHILS NFR BLD: 1 %
BILIRUB DIRECT SERPL-MCNC: 0.2 MG/DL (ref ?–0.3)
BILIRUB SERPL-MCNC: 0.8 MG/DL (ref 0.2–1.1)
BUN BLD-MCNC: 25 MG/DL (ref 9–23)
CALCIUM BLD-MCNC: 9.6 MG/DL (ref 8.7–10.6)
CHLORIDE SERPL-SCNC: 107 MMOL/L (ref 98–112)
CO2 SERPL-SCNC: 32 MMOL/L (ref 21–32)
CREAT BLD-MCNC: 1.01 MG/DL
EGFRCR SERPLBLD CKD-EPI 2021: 59 ML/MIN/1.73M2 (ref 60–?)
EOSINOPHIL # BLD: 0.11 X10(3) UL (ref 0–0.7)
EOSINOPHIL NFR BLD: 1 %
ERYTHROCYTE [DISTWIDTH] IN BLOOD BY AUTOMATED COUNT: 13.5 %
FASTING STATUS PATIENT QL REPORTED: NO
GLOBULIN PLAS-MCNC: 2.2 G/DL (ref 2–3.5)
GLUCOSE BLD-MCNC: 111 MG/DL (ref 70–99)
HAPTOGLOB SERPL-MCNC: 101 MG/DL (ref 30–200)
HCT VFR BLD AUTO: 38.9 %
HGB BLD-MCNC: 12.7 G/DL
HGB RETIC QN AUTO: 33.5 PG (ref 28.2–36.6)
IMM RETICS NFR: 0.09 RATIO (ref 0.1–0.3)
LDH SERPL L TO P-CCNC: 275 U/L
LYMPHOCYTES NFR BLD: 0.85 X10(3) UL (ref 1–4)
LYMPHOCYTES NFR BLD: 8 %
MCH RBC QN AUTO: 30.7 PG (ref 26–34)
MCHC RBC AUTO-ENTMCNC: 32.6 G/DL (ref 31–37)
MCV RBC AUTO: 94 FL
METAMYELOCYTES # BLD: 0.11 X10(3) UL
METAMYELOCYTES NFR BLD: 1 %
MONOCYTES # BLD: 0.32 X10(3) UL (ref 0.1–1)
MONOCYTES NFR BLD: 3 %
MORPHOLOGY: NORMAL
NEUTROPHILS # BLD AUTO: 8.4 X10 (3) UL (ref 1.5–7.7)
NEUTROPHILS NFR BLD: 85 %
NEUTS BAND NFR BLD: 1 %
NEUTS HYPERSEG # BLD: 9.12 X10(3) UL (ref 1.5–7.7)
OSMOLALITY SERPL CALC.SUM OF ELEC: 307 MOSM/KG (ref 275–295)
PLATELET # BLD AUTO: 177 10(3)UL (ref 150–450)
PLATELET MORPHOLOGY: NORMAL
POTASSIUM SERPL-SCNC: 3.7 MMOL/L (ref 3.5–5.1)
PROT SERPL-MCNC: 6.6 G/DL (ref 5.7–8.2)
RBC # BLD AUTO: 4.14 X10(6)UL
RETICS # AUTO: 76.6 X10(3) UL (ref 22.5–147.5)
RETICS/RBC NFR AUTO: 1.9 %
SODIUM SERPL-SCNC: 146 MMOL/L (ref 136–145)
TOTAL CELLS COUNTED BLD: 100
WBC # BLD AUTO: 10.6 X10(3) UL (ref 4–11)

## 2025-03-20 RX ORDER — ACETAMINOPHEN 325 MG/1
650 TABLET ORAL ONCE
Status: COMPLETED | OUTPATIENT
Start: 2025-03-20 | End: 2025-03-20

## 2025-03-20 RX ORDER — DIPHENHYDRAMINE HYDROCHLORIDE 50 MG/ML
50 INJECTION, SOLUTION INTRAMUSCULAR; INTRAVENOUS ONCE
Status: COMPLETED | OUTPATIENT
Start: 2025-03-20 | End: 2025-03-20

## 2025-03-20 RX ORDER — METHYLPREDNISOLONE SODIUM SUCCINATE 125 MG/2ML
100 INJECTION INTRAMUSCULAR; INTRAVENOUS ONCE
Status: COMPLETED | OUTPATIENT
Start: 2025-03-20 | End: 2025-03-20

## 2025-03-20 RX ADMIN — DIPHENHYDRAMINE HYDROCHLORIDE 50 MG: 50 INJECTION, SOLUTION INTRAMUSCULAR; INTRAVENOUS at 10:20:00

## 2025-03-20 RX ADMIN — ACETAMINOPHEN 650 MG: 325 TABLET ORAL at 10:23:00

## 2025-03-20 RX ADMIN — METHYLPREDNISOLONE SODIUM SUCCINATE 100 MG: 125 INJECTION INTRAMUSCULAR; INTRAVENOUS at 10:20:00

## 2025-03-20 NOTE — PROGRESS NOTES
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 of a mixture of T and B lymphocytes.  Based on CD20 stain, the B cell infiltrate comprises less than 5% of bone marrow cellularity.  Cyclin D1 is negative within lymphocytes.  CD34 and  show no significant increase in blasts.   highlights occasional plasma cells which comprise less than 5% of bone marrow cellularity.  Sheets of plasma cells are not seen.  In situ hybridization for kappa and lambda demonstrates that the plasma cells are kappa light chain restricted.  Special stain for reticulin demonstrates no significant reticulin fibrosis.    Flow cytometry immunophenotyping studies were performed on the submitted bone marrow aspirate.  The lymphoid population is composed predominantly of T cells with occasional B cells and NK cells.  Although B cells comprise a minority of the lymphoid population, the B cells are monotypic based on kappa surface immunoglobulin light chain restriction.  The monotypic B cells are CD19 positive, CD20 positive, CD5 negative, and CD10 negative.  T cells show no significant antigen deletion with the markers assayed.  The CD4-CD8 ratio is unremarkable.  Based on selective gating, there is a small, aberrant  population of bright CD38 positive, CD56 positive plasma cells ( less than 20 events) that shows a marked kappa cytoplasmic immunoglobulin light predominance.      Differential considerations for the small monotypic B-cell and plasma cell infiltrates in the setting of IgM kappa gammopathy include IgM monoclonal gammopathy of undetermined significance and focal bone marrow involvement by a lymphoplasmacytic lymphoma.  The histologic features favor IgM gammopathy of undetermined significance.  Clinical and radiographic correlation is suggested.           Differential considerations for the hypercellular bone marrow with erythroid predominance and mild megaloblastoid change include reactive conditions (nutritional deficiency, toxins, autoimmune conditions, infection, or hemolysis) as well as an evolving low-grade myelodysplastic syndrome.  Based on morphology, a reactive etiology is favored.  Correlation with clinical findings and pending conventional cytogenetic studies/ molecular studies is suggested.     Dr. Goldberg has reviewed the case and concurs.         Ancillary Studies      Bone Marrow Microscopic Description:  CBC: January 6, 2025  WBC (103/ul) 7.4   RBC (106/ul) 2.38   HGB (gm/dl) 8.4   HCT (%) 25   PLATELET (103/uL) 54   MCV (fL) 105   MCH (pg) 35.3   MCHC (gm/dL) 33.6   RDW (%) 21.9   Neutrophils (103/uL) 5.07   Lymphocytes (103/uL) 0.85   Monocytes (103/uL) 0.40   Eosinophils (103/uL) 0.68   Basophils (103/uL) 0.12      Peripheral Blood Smear Interpretation: A recent peripheral blood smear is not available for review at the time of diagnosis.  Bone Marrow Aspirate, Clot, Biopsy And Touch Preps:   Adequacy: The bone marrow aspirate smears are cellular and adequate for evaluation.  The core biopsy consists of trabecular bone and adequate bone marrow.  Aspirate Differential (Percent of 500-cell count):      Granulocytes 41   Blasts <1   Normoblasts 52   Lymphocytes 5   Monocytes <1   Eosinophils 2    Basophils <1   Plasma Cells <1      Touch Preps: The touch preparations contain myeloid, erythroid and megakaryocytic elements  Clot: The particle clot section consists of cellular particles with myeloid erythroid and megakaryocytic elements  Cellularity:  ASPIRATE: Active  BIOPSY %: The core biopsy is hypercellular for age with an estimated cellularity of 50%.  Erythroid:  CELLULARITY: Increased  MORPHOLOGY: The erythroid series shows mild megaloblastoid changes.  Myeloid:  CELLULARITY: Adequate to slightly increased  MORPHOLOGY: The myeloid series shows progressive maturation.  Blasts are not increased  Megakaryocyte:  CELLULARITY: Adequate  MORPHOLOGY: The majority of megakaryocytes are unremarkable.  Rare small hypolobated megakaryocytes are noted  Lymphocytes: Lymphocytes are small and mature appearing.  There are small well-circumscribed nonparatrabecular lymphoid aggregates composed of small mature appearing lymphocytes.  Plasma Cells: Plasma cells are not significantly increased.  The plasma cells are small with condensed chromatin.  Based on immunohistochemistry, the plasma cells comprise less than 5% of bone marrow cellularity.  Sheets of plasma cells are not identified.  Bony Trabeculae: Unremarkable  Immunohistochemistry and Special Stains:     Immunohistochemistry:     Material:  Block A1/B1  Population:  Tissue     Antibody                          Result  CD3                                   See Comment  CD20                                See Comment  CD34                                See Comment                                See Comment                                    See Comment  Cyclin D1                         See Comment           Medical Necessity    Immunohistochemical stains were performed:                  See Comment                   Positive tissue controls were utilized in the staining process.  These slides were reviewed by the signout Pathologist and showed  appropriate staining results.     Interpreted by:  Juan Lucas MD     Methodology:         Immunohistochemical stains are performed on formalin-fixed, paraffin-embedded tissue sections.  Deparaffinization, antigen retrieval, and staining utilizes the automated Leica Goode III immunohistochemistry platform.  A proprietary, non-biotin, polymer-based detection system (Bond Polymer Refine DetectionTM ) is employed.  All antibodies are validated by [facility] Department of Pathology to document appropriate staining reactions.  Positive controls are utilized and show appropriate reactivity.     Special Stain(s):     Material:  Block A and Aspirate   Population:  Tissue     Stain                                Result  Iron                                    See Comment   Retic                                 See Comment                                              Positive tissue controls were utilized in the staining process.  These slides were reviewed by the [facility] Pathologist and showed appropriate staining results.     Interpreted by: Juan Lucas MD            Impression & Plan: Mrs. Childress is a 75 y/o F with PMHx of mildly elevated liver tests (+ smooth muscle antibody), HTN, HLD, Type 2 DM with albuminuria, basal cell carcinoma of skin of face s/p resection who presents for evaluation of anemia and thrombocytopenia. She was found to have IgM MGUS, CCUS, and warm autoimmune hemolytic anemia.       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   - hemolysis labs 1/7/25: reticulocyte % 16.5, retic absolute 405 (very elevated),  (elevated), Blood bank Antibody screen positive with Warm IgG antibodies. ALMA positive for poly & IgG (+4); negative for C3D, Tbili 3.3 with indirect bili 2.2, haptoglobin < 10  - labs 1/23/25: , tbili 6.2 , indirect bilirubin 5.6,  haptoglobin < 10, Hb 7.7 g/dL, hematocrit 22.1, plt 145 (improved from 54), retic 13.6% and absolute retic 301, ALMA Poly positive, IgG positive, C3D Negative  - 1/23/25: started prednisone 70 mg daily (1 mg/kg/daily) along with GI stress ulcer prophylaxis  - 2/10/25: She was seen at [facility] for second opinion on 2/10/25 by Dr. Tejinder Pineda (hematology/oncology), who agreed with my workup and management. He did have a suggestion to add 4-week course of rituximab, since it has been associated with longer chances of remission.  She has follow up with Dr. Pineda in April 2025. I greatly appreciate his input.  - 2/27/25: C1D1 Rituximab 375 mg/m2  - 3/6/25: C1D8 Rituximab 375 mg/m2  - 3/13/25: C1D15 Rituximab 375 mg/m2  - 3/20/25: C1D15 Rituximab 375 mg/m2    Recommendations:  TODAY 3/20/25:  - Labs today show significantly improved Hb at 12.5 g/dL. Anemia has resolved her hemolysis labs today are normal. Her energy is significantly improved and she feels better, overall. Continue steroid taper as below:  - TAPER steroids as follows:  1/31 - 2/6: prednisone 60 mg daily  2/7 - 2/13: prednisone 50 mg daily  2/14- 2/20: prednisone 40 mg daily  2/21- 2/27: prednisone 30 mg daily  2/28 - 3/6: prednisone 20 mg daily  3/7 - 3/13: prednisone 10 mg daily  3/14 - 3/20: prednisone 7.5 mg daily  3/21 - 3/27: prednisone 5.0 mg daily  3/28 - 4/3: prednisone 2.5 mg daily  4/4/2025: OFF steroids. Can discontinue PPI if no GERD or GI symptoms    - Per literature, 4-week course of rituximab 375 mg/m2 weekly in conjunction with steroids in the first-line treatment setting of WAIHA has shown to improve complete remission rates. Hep B profile normal. Labs adequate for treatment. Proceed with C1D22 rituximab today.       IgM Monoclonal Gammopathy of Undetermined Significance (IgM MGUS), High-Intermediate risk MGUS -- Improved with steroids           Clonal Cytopenia of Undetermined Significance (CCUS) diagnosed 1/29/25 -- given  anemia, thrombocytopenia, and TET2 mutation    Severe Fatigue (Improved), Peripheral Neuropathy, Postural Hypotension    Differential diagnosis: Lymphoplasmacytic lymphoma, Waldenstrom's macroglobulinemia, non-secretory myeloma, evolving low-grade MDS.    Workup:  Labs 1/7/25   Blood counts:  Hb 8.4, hematocrit 25, plt 54 k, , absolute lymphocytes 850   Citrated platelet count 79.2. confirms true thrombocytopenia with plt < 150k  , uric acid 5.9  SPEP with PARIS: kappa free light chain 6.589, lambda normal 2.1, kappa/lambda ratio 3.02, beta globulins 1.25. Monoclonal IgM kappa seen (monoclonal spike in beta region)   QI: IgA 81.2, IgG 914, IgM 917.2 (elevated)  Beta-2 microglobulin: 5.97  Cryoglobulins: none detected  [facility] panel: no evidence of PNH  KWESI IFA Screen: Negative  HbSAg negative, HCV Ab nonreactive, Hep B surface Ab reactive,  HIV negative, Hep B Core Ab nonreactive  CT C/A/P with stable small RUL pulmonary nodule 5 mm, nodularity on liver (6 x 5 mm) sable from last time, and nabeel hepatis lymph node 18 x 11 mm decreased in size from previous, portal caval lymph node 25 x 14 mm (stable since previous study), aortocaval lymph node 5 x 5 mm stable    BMBx 1/16/25   Small monotypic lymphoplasmacytic infiltrate, consistent with IgM monoclonal gammopathy of undetermined significance.  Background hypercellular bone marrow with multilineage hematopoiesis,  erythroid predominance, and mild megaloblastoid change.    Cytogenetics: normal karyotype  Special stains: negative for congo red stain.     1/23/25: I spoke to our pathologist Dr. Lucas in detail regarding this patient's Bone marrow biopsy results. She has < 5 % plasma cells in the bone marrow, excluding the diagnosis of multiple myeloma at this time. She does have IgM MGUS with hypercellular bone marrow (50% cellular).  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 Gly1 361Arg, Frequency 24%    1/23/25: Started prednisone 70 mg PO every day (1mg/kg/daily) along with daily pantoprazole for stress ulcer prophylaxis.     1/28/25 Labs:  SPEP with monoclonal spike in beta region. Monoclonal IgM kappa. Kappa free light chains 2.752 , lambda 0.848, k/l ratio 3.25 (up from 3.02)  IgM decreased to 536.4 after initiating prednisone  Hemolysis labs improving (reticulocyte, LDH, haptoglobin)  proBNP elevated at 1677  High sensitivity troponin <3  EKG with ,  (mildly low)  2D echo: EF 65-70%, no mention of abnormal global longitudinal strain  TSH, prolcatin, estradiol, testosterone are normal -- making POEMS less likely given lack of endocrinopathy. Her fasting glucose is high because she is on a steroid course    2/10/25: Patient seen by Dr. Tejinder Pineda at [facility] for second opinion. He does not think patient has Waldenstrom's since cytopenias in WM are usually due to bulky marrow involvement --which patient does not have. I agree with his assessment.   Labs at [facility] with negative cold agglutinin titer, Kappa/lambda 2.47 (elevated ) with kappa 2.10 and lambda light chain 0.85, QI with IgM 366 (High)/IgA 36(low)/IgG 514 (low), SPEP with Monoclonal M-protein of 0.3 g/dL    Recommendations:   - Patient now has CCUS as documented above, given she has clear evidence of anemia, thrombocytopenia, and TET-2 mutation with frequency 24% (> 2%). This may be a sign of low-grade evolving MDS. It's unclear if this is a separate process from her high-intermediate risk IgM MGUS.   - Of note, the patient's IgM level did decrease and her thrombocytopenia has completely resolved after I started steroids.  Monitor labs every 3 months: SPEP with PARIS, QI, kappa/lambda ratio (next due ~4/6/25)  - Given her persistent bilateral finger neuropathy, I have referred to neurology for EMG.  She saw  neurology recently, Dr. Bey on 3/12/25. EMG showed mildly abnormal study, due to isolated reduced amplitude of the right sural sensory response. He states \"here is, however, no evidence for a widespread polyneuropathy affecting upper and lower extremities and no suggestion for a primary demyelinating neuropathy, or atypical neuropathy, or myopathy.  Of note, testing was somewhat limited by electrical artifact as well; clinical correlation is advised as to the etiology of these findings.\"   - I reviewed her further lab results above. I am not sure why her proBNP is so elevated in the absence of heart failure. She does not have any signs of organomegaly. Troponin, EKG, Echo are normal. Less likely amyloidosis  - Serum hyperviscosity normal  - PET-CT scan pending after completion of steroid taper. She will need tissue biopsy if there is any abnormal uptake/mass.    3. Hyperglycemia secondary to steroid administration  - Patient is on insulin to control blood sugars  - Following with endocrinology, I appreciate their recommendations      4. CKD stage 3a  - baseline Cr 1-1.3  - following with nephrology, I appreciate their recommendations      Follow-Up: Return to clinic in 2 week for repeat labs and visit with me.       Beth Ojeda D.O.  [facility]

## 2025-03-20 NOTE — PROGRESS NOTES
Pt here for fourth dose if Rituximab. Pt will be started 5mg prednisone taper tomorrow. Pt states her facial swelling was decreased with last dose of Rituximab. Blood sugars are improving, she will schedule her PET scan soon.         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

## 2025-03-20 NOTE — PROGRESS NOTES
Pt here for C1D22 Drug(s)Truxima.  Arrives Ambulating independently, accompanied by Self and Spouse     Patient was evaluated today by MD and Treatment Nurse.    Oral medications included in this regimen:  yes - Tylenol    Patient confirms comprehension of cancer treatment schedule:  yes    Pregnancy screening:  Not applicable    Modifications in dose or schedule:  No    Medications appearance and physical integrity checked by RN: yes.    Chemotherapy IV pump settings verified by 2 RNs:  Yes.  Frequency of blood return and site check throughout administration: Prior to administration     Infusion/treatment outcome:  patient tolerated treatment without incident    Education Record    Learner:  Patient  Barriers / Limitations:  None  Method:  Brief focused  Education / instructions given:  patient  Outcome:  Shows understanding    Discharged Home, Ambulating independently, accompanied by:Self    Patient/family verbalized understanding of future appointments: by printed AVS    Patient started PIV and blood was drawn from PIV. MD evaluated patient back in treatment area. Given premedications as ordered and tolerated her Truxima well. Given future AVS   1

## 2025-03-26 DIAGNOSIS — D59.11 WARM AUTOIMMUNE HEMOLYTIC ANEMIA (HCC): Primary | ICD-10-CM

## 2025-03-28 ENCOUNTER — HOSPITAL ENCOUNTER (OUTPATIENT)
Dept: BONE DENSITY | Age: 75
Discharge: HOME | End: 2025-03-28
Attending: INTERNAL MEDICINE
Payer: MEDICARE

## 2025-03-28 DIAGNOSIS — Z79.52 ON PREDNISONE THERAPY: ICD-10-CM

## 2025-03-28 PROCEDURE — 77080 DXA BONE DENSITY AXIAL: CPT | Performed by: INTERNAL MEDICINE

## 2025-04-03 ENCOUNTER — OFFICE VISIT (OUTPATIENT)
Age: 75
End: 2025-04-03
Attending: INTERNAL MEDICINE
Payer: MEDICARE

## 2025-04-03 ENCOUNTER — NURSE ONLY (OUTPATIENT)
Age: 75
End: 2025-04-03
Attending: INTERNAL MEDICINE
Payer: MEDICARE

## 2025-04-03 VITALS
SYSTOLIC BLOOD PRESSURE: 162 MMHG | BODY MASS INDEX: 26.4 KG/M2 | HEIGHT: 61.1 IN | TEMPERATURE: 98 F | RESPIRATION RATE: 18 BRPM | HEART RATE: 76 BPM | OXYGEN SATURATION: 98 % | DIASTOLIC BLOOD PRESSURE: 66 MMHG | WEIGHT: 139.81 LBS

## 2025-04-03 DIAGNOSIS — D59.11 WARM AUTOIMMUNE HEMOLYTIC ANEMIA (HCC): ICD-10-CM

## 2025-04-03 DIAGNOSIS — D59.11 WARM AUTOIMMUNE HEMOLYTIC ANEMIA (HCC): Primary | ICD-10-CM

## 2025-04-03 LAB
ALBUMIN SERPL-MCNC: 4.5 G/DL (ref 3.2–4.8)
ALBUMIN/GLOB SERPL: 2 {RATIO} (ref 1–2)
ALP LIVER SERPL-CCNC: 91 U/L
ALT SERPL-CCNC: 30 U/L
ANION GAP SERPL CALC-SCNC: 9 MMOL/L (ref 0–18)
AST SERPL-CCNC: 23 U/L (ref ?–34)
BASOPHILS # BLD AUTO: 0.05 X10(3) UL (ref 0–0.2)
BASOPHILS NFR BLD AUTO: 0.6 %
BILIRUB DIRECT SERPL-MCNC: 0.3 MG/DL (ref ?–0.3)
BILIRUB SERPL-MCNC: 1.1 MG/DL (ref 0.2–1.1)
BUN BLD-MCNC: 13 MG/DL (ref 9–23)
CALCIUM BLD-MCNC: 9.5 MG/DL (ref 8.7–10.6)
CHLORIDE SERPL-SCNC: 109 MMOL/L (ref 98–112)
CO2 SERPL-SCNC: 28 MMOL/L (ref 21–32)
CREAT BLD-MCNC: 1.14 MG/DL
EGFRCR SERPLBLD CKD-EPI 2021: 51 ML/MIN/1.73M2 (ref 60–?)
EOSINOPHIL # BLD AUTO: 0.05 X10(3) UL (ref 0–0.7)
EOSINOPHIL NFR BLD AUTO: 0.6 %
ERYTHROCYTE [DISTWIDTH] IN BLOOD BY AUTOMATED COUNT: 13.9 %
GLOBULIN PLAS-MCNC: 2.2 G/DL (ref 2–3.5)
GLUCOSE BLD-MCNC: 163 MG/DL (ref 70–99)
HAPTOGLOB SERPL-MCNC: 92 MG/DL (ref 30–200)
HCT VFR BLD AUTO: 36.1 %
HGB BLD-MCNC: 12.3 G/DL
HGB RETIC QN AUTO: 34.5 PG (ref 28.2–36.6)
IMM GRANULOCYTES # BLD AUTO: 0.28 X10(3) UL (ref 0–1)
IMM GRANULOCYTES NFR BLD: 3.6 %
IMM RETICS NFR: 0.12 RATIO (ref 0.1–0.3)
LDH SERPL L TO P-CCNC: 286 U/L
LYMPHOCYTES # BLD AUTO: 0.46 X10(3) UL (ref 1–4)
LYMPHOCYTES NFR BLD AUTO: 5.9 %
MCH RBC QN AUTO: 30.7 PG (ref 26–34)
MCHC RBC AUTO-ENTMCNC: 34.1 G/DL (ref 31–37)
MCV RBC AUTO: 90 FL
MONOCYTES # BLD AUTO: 0.55 X10(3) UL (ref 0.1–1)
MONOCYTES NFR BLD AUTO: 7 %
NEUTROPHILS # BLD AUTO: 6.47 X10 (3) UL (ref 1.5–7.7)
NEUTROPHILS # BLD AUTO: 6.47 X10(3) UL (ref 1.5–7.7)
NEUTROPHILS NFR BLD AUTO: 82.3 %
OSMOLALITY SERPL CALC.SUM OF ELEC: 306 MOSM/KG (ref 275–295)
PLATELET # BLD AUTO: 209 10(3)UL (ref 150–450)
POTASSIUM SERPL-SCNC: 3.8 MMOL/L (ref 3.5–5.1)
PROT SERPL-MCNC: 6.7 G/DL (ref 5.7–8.2)
RBC # BLD AUTO: 4.01 X10(6)UL
RETICS # AUTO: 65.8 X10(3) UL (ref 22.5–147.5)
RETICS/RBC NFR AUTO: 1.6 %
SODIUM SERPL-SCNC: 146 MMOL/L (ref 136–145)
WBC # BLD AUTO: 7.9 X10(3) UL (ref 4–11)

## 2025-04-03 NOTE — PATIENT INSTRUCTIONS
Since your blood sugars have become more controlled, you can proceed with getting your PET scan completed at your convenience. To schedule, please call [phone].    We will see you back in the office the week of 5/12/2025 for repeat labs and visit.

## 2025-04-03 NOTE — PROGRESS NOTES
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 megakaryocytic elements are present and adequate to slightly increased numbers.  There are small well-circumscribed, nonparatrabecular lymphoid aggregates.  Immunoperoxidase stains were performed to further evaluate the core biopsy and clot section.  CD3 and CD20 highlight scattered interstitial small lymphocytes and demonstrate that the lymphoid aggregates are composed of a mixture of T and B lymphocytes.  Based on CD20 stain, the B cell infiltrate comprises less than 5% of bone marrow cellularity.  Cyclin D1 is negative within lymphocytes.  CD34 and  show no significant increase in blasts.   highlights occasional plasma cells which comprise less than 5% of bone marrow cellularity.  Sheets of plasma cells are not seen.  In situ hybridization for kappa and lambda demonstrates that the plasma cells are kappa light chain restricted.  Special stain for reticulin demonstrates no significant reticulin fibrosis.    Flow cytometry immunophenotyping studies were performed on the submitted bone marrow aspirate.  The lymphoid population is composed predominantly of T cells with occasional B cells and NK cells.  Although B cells comprise a minority of the lymphoid  population, the B cells are monotypic based on kappa surface immunoglobulin light chain restriction.  The monotypic B cells are CD19 positive, CD20 positive, CD5 negative, and CD10 negative.  T cells show no significant antigen deletion with the markers assayed.  The CD4-CD8 ratio is unremarkable.  Based on selective gating, there is a small, aberrant population of bright CD38 positive, CD56 positive plasma cells ( less than 20 events) that shows a marked kappa cytoplasmic immunoglobulin light predominance.      Differential considerations for the small monotypic B-cell and plasma cell infiltrates in the setting of IgM kappa gammopathy include IgM monoclonal gammopathy of undetermined significance and focal bone marrow involvement by a lymphoplasmacytic lymphoma.  The histologic features favor IgM gammopathy of undetermined significance.  Clinical and radiographic correlation is suggested.           Differential considerations for the hypercellular bone marrow with erythroid predominance and mild megaloblastoid change include reactive conditions (nutritional deficiency, toxins, autoimmune conditions, infection, or hemolysis) as well as an evolving low-grade myelodysplastic syndrome.  Based on morphology, a reactive etiology is favored.  Correlation with clinical findings and pending conventional cytogenetic studies/ molecular studies is suggested.     Dr. Goldberg has reviewed the case and concurs.         Ancillary Studies      Bone Marrow Microscopic Description:  CBC: January 6, 2025  WBC (103/ul) 7.4   RBC (106/ul) 2.38   HGB (gm/dl) 8.4   HCT (%) 25   PLATELET (103/uL) 54   MCV (fL) 105   MCH (pg) 35.3   MCHC (gm/dL) 33.6   RDW (%) 21.9   Neutrophils (103/uL) 5.07   Lymphocytes (103/uL) 0.85   Monocytes (103/uL) 0.40   Eosinophils (103/uL) 0.68   Basophils (103/uL) 0.12      Peripheral Blood Smear Interpretation: A recent peripheral blood smear is not available for review at the time of diagnosis.  Bone Marrow  Aspirate, Clot, Biopsy And Touch Preps:   Adequacy: The bone marrow aspirate smears are cellular and adequate for evaluation.  The core biopsy consists of trabecular bone and adequate bone marrow.  Aspirate Differential (Percent of 500-cell count):      Granulocytes 41   Blasts <1   Normoblasts 52   Lymphocytes 5   Monocytes <1   Eosinophils 2   Basophils <1   Plasma Cells <1      Touch Preps: The touch preparations contain myeloid, erythroid and megakaryocytic elements  Clot: The particle clot section consists of cellular particles with myeloid erythroid and megakaryocytic elements  Cellularity:  ASPIRATE: Active  BIOPSY %: The core biopsy is hypercellular for age with an estimated cellularity of 50%.  Erythroid:  CELLULARITY: Increased  MORPHOLOGY: The erythroid series shows mild megaloblastoid changes.  Myeloid:  CELLULARITY: Adequate to slightly increased  MORPHOLOGY: The myeloid series shows progressive maturation.  Blasts are not increased  Megakaryocyte:  CELLULARITY: Adequate  MORPHOLOGY: The majority of megakaryocytes are unremarkable.  Rare small hypolobated megakaryocytes are noted  Lymphocytes: Lymphocytes are small and mature appearing.  There are small well-circumscribed nonparatrabecular lymphoid aggregates composed of small mature appearing lymphocytes.  Plasma Cells: Plasma cells are not significantly increased.  The plasma cells are small with condensed chromatin.  Based on immunohistochemistry, the plasma cells comprise less than 5% of bone marrow cellularity.  Sheets of plasma cells are not identified.  Bony Trabeculae: Unremarkable  Immunohistochemistry and Special Stains:     Immunohistochemistry:     Material:  Block A1/B1  Population:  Tissue     Antibody                          Result  CD3                                   See Comment  CD20                                See Comment  CD34                                See Comment                                See Comment                                     See Comment  Cyclin D1                         See Comment           Medical Necessity    Immunohistochemical stains were performed:                  See Comment                   Positive tissue controls were utilized in the staining process.  These slides were reviewed by the signout Pathologist and showed appropriate staining results.     Interpreted by:  Juan Lucas MD     Methodology:         Immunohistochemical stains are performed on formalin-fixed, paraffin-embedded tissue sections.  Deparaffinization, antigen retrieval, and staining utilizes the automated Leica Goode III immunohistochemistry platform.  A proprietary, non-biotin, polymer-based detection system (Bond Polymer Refine DetectionTM ) is employed.  All antibodies are validated by [facility] Department of Pathology to document appropriate staining reactions.  Positive controls are utilized and show appropriate reactivity.     Special Stain(s):     Material:  Block A and Aspirate   Population:  Tissue     Stain                                Result  Iron                                    See Comment   Retic                                 See Comment                                              Positive tissue controls were utilized in the staining process.  These slides were reviewed by the signout Pathologist and showed appropriate staining results.     Interpreted by: Juan Lucas MD            Impression & Plan: Mrs. Childress is a 75 y/o F with PMHx of mildly elevated liver tests (+ smooth muscle antibody), HTN, HLD, Type 2 DM with albuminuria, basal cell carcinoma of skin of face s/p resection who presents for evaluation of anemia and thrombocytopenia. She was found to have IgM MGUS, CCUS, and warm autoimmune hemolytic anemia.       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   - hemolysis labs 1/7/25: reticulocyte % 16.5, retic absolute 405 (very elevated),  (elevated), Blood bank Antibody screen positive with Warm IgG antibodies. ALMA positive for poly & IgG (+4); negative for C3D, Tbili 3.3 with indirect bili 2.2, haptoglobin < 10  - labs 1/23/25: , tbili 6.2 , indirect bilirubin 5.6, haptoglobin < 10, Hb 7.7 g/dL, hematocrit 22.1, plt 145 (improved from 54), retic 13.6% and absolute retic 301, ALMA Poly positive, IgG positive, C3D Negative  - 1/23/25: started prednisone 70 mg daily (1 mg/kg/daily) along with GI stress ulcer prophylaxis  - 2/10/25: She was seen at [facility] for second opinion on 2/10/25 by Dr. Tejinder Pineda (hematology/oncology), who agreed with my workup and management. He did have a suggestion to add 4-week course of rituximab, since it has been associated with longer chances of remission.  She has follow up with Dr. Pineda in April 2025. I greatly appreciate his input.  - 2/27/25: C1D1 Rituximab 375 mg/m2  - 3/6/25: C1D8 Rituximab 375 mg/m2  - 3/13/25: C1D15 Rituximab 375 mg/m2  - 3/20/25: C1D15 Rituximab 375 mg/m2    Recommendations:  TODAY 4/3/25:  - Labs today show stable Hb at 12.3 g/dL. Platelets stable at 209k. Hemolysis labs are normal. She completes steroids today. She completed 4 doses of weekly rituximab, as above.   - TAPER steroids as follows:  1/31 - 2/6: prednisone 60 mg daily  2/7 - 2/13: prednisone 50 mg daily  2/14- 2/20: prednisone 40 mg daily  2/21- 2/27: prednisone 30 mg daily  2/28 - 3/6: prednisone 20 mg daily  3/7 - 3/13: prednisone 10 mg daily  3/14 - 3/20: prednisone 7.5 mg daily  3/21 - 3/27: prednisone 5.0 mg daily  3/28 - 4/3: prednisone 2.5 mg daily  4/4/2025: OFF steroids. Can discontinue PPI if no GERD or GI symptoms      IgM Monoclonal Gammopathy of Undetermined Significance (IgM MGUS), High-Intermediate risk MGUS -- Improved with steroids           Clonal Cytopenia of Undetermined Significance (CCUS)  diagnosed 1/29/25 -- given anemia, thrombocytopenia, and TET2 mutation    Severe Fatigue (Improved), Peripheral Neuropathy, Postural Hypotension    Differential diagnosis: Lymphoplasmacytic lymphoma, Waldenstrom's macroglobulinemia, non-secretory myeloma, evolving low-grade MDS.    Workup:  Labs 1/7/25   Blood counts:  Hb 8.4, hematocrit 25, plt 54 k, , absolute lymphocytes 850   Citrated platelet count 79.2. confirms true thrombocytopenia with plt < 150k  , uric acid 5.9  SPEP with PARIS: kappa free light chain 6.589, lambda normal 2.1, kappa/lambda ratio 3.02, beta globulins 1.25. Monoclonal IgM kappa seen (monoclonal spike in beta region)   QI: IgA 81.2, IgG 914, IgM 917.2 (elevated)  Beta-2 microglobulin: 5.97  Cryoglobulins: none detected  PN[facility] panel: no evidence of PNH  KWESI IFA Screen: Negative  HbSAg negative, HCV Ab nonreactive, Hep B surface Ab reactive,  HIV negative, Hep B Core Ab nonreactive  CT C/A/P with stable small RUL pulmonary nodule 5 mm, nodularity on liver (6 x 5 mm) sable from last time, and nabeel hepatis lymph node 18 x 11 mm decreased in size from previous, portal caval lymph node 25 x 14 mm (stable since previous study), aortocaval lymph node 5 x 5 mm stable    BMBx 1/16/25   Small monotypic lymphoplasmacytic infiltrate, consistent with IgM monoclonal gammopathy of undetermined significance.  Background hypercellular bone marrow with multilineage hematopoiesis,  erythroid predominance, and mild megaloblastoid change.    Cytogenetics: normal karyotype  Special stains: negative for congo red stain.     1/23/25: I spoke to our pathologist Dr. Lucas in detail regarding this patient's Bone marrow biopsy results. She has < 5 % plasma cells in the bone marrow, excluding the diagnosis of multiple myeloma at this time. She does have IgM MGUS with hypercellular bone marrow (50% cellular).  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 Gly1 361Arg, Frequency 24%    1/23/25: Started prednisone 70 mg PO every day (1mg/kg/daily) along with daily pantoprazole for stress ulcer prophylaxis.     1/28/25 Labs:  SPEP with monoclonal spike in beta region. Monoclonal IgM kappa. Kappa free light chains 2.752 , lambda 0.848, k/l ratio 3.25 (up from 3.02)  IgM decreased to 536.4 after initiating prednisone  Hemolysis labs improving (reticulocyte, LDH, haptoglobin)  proBNP elevated at 1677  High sensitivity troponin <3  EKG with ,  (mildly low)  2D echo: EF 65-70%, no mention of abnormal global longitudinal strain  TSH, prolcatin, estradiol, testosterone are normal -- making POEMS less likely given lack of endocrinopathy. Her fasting glucose is high because she is on a steroid course    2/10/25: Patient seen by Dr. Tejinder Pineda at [facility] for second opinion. He does not think patient has Waldenstrom's since cytopenias in WM are usually due to bulky marrow involvement --which patient does not have. I agree with his assessment.   Labs at [facility] with negative cold agglutinin titer, Kappa/lambda 2.47 (elevated ) with kappa 2.10 and lambda light chain 0.85, QI with IgM 366 (High)/IgA 36(low)/IgG 514 (low), SPEP with Monoclonal M-protein of 0.3 g/dL    Recommendations:   - Patient now has CCUS as documented above, given she has clear evidence of anemia, thrombocytopenia, and TET-2 mutation with frequency 24% (> 2%). This may be a sign of low-grade evolving MDS. It's unclear if this is a separate process from her high-intermediate risk IgM MGUS.   - Of note, the patient's IgM level did decrease and her thrombocytopenia has completely resolved after I started steroids.  Monitor labs every 3 months: SPEP with PARIS, QI, kappa/lambda ratio (ordered, next due ~4/16/25)  - Given her persistent bilateral finger neuropathy, I have referred to  neurology for EMG.  She saw neurology recently, Dr. Bey on 3/12/25. EMG showed mildly abnormal study, due to isolated reduced amplitude of the right sural sensory response. He states \"here is, however, no evidence for a widespread polyneuropathy affecting upper and lower extremities and no suggestion for a primary demyelinating neuropathy, or atypical neuropathy, or myopathy.  Of note, testing was somewhat limited by electrical artifact as well; clinical correlation is advised as to the etiology of these findings.\"   - I reviewed her further lab results above. I am not sure why her proBNP is so elevated in the absence of heart failure. She does not have any signs of organomegaly. Troponin, EKG, Echo are normal. Less likely amyloidosis  - Serum hyperviscosity normal  - PET-CT scan pending after completion of steroid taper. She will need tissue biopsy if there is any abnormal uptake/mass.    3. Hyperglycemia secondary to steroid administration - improved  - Patient is on insulin to control blood sugars  - Following with endocrinology, I appreciate their recommendations      4. CKD stage 3a  - baseline Cr 1-1.3  - following with nephrology, I appreciate their recommendations      Follow-Up: Return to clinic second week of May 2025 for repeat labs (CBC with diff, CMP) and visit with me.     Beth Ojeda D.O.  [facility]

## 2025-04-03 NOTE — PROGRESS NOTES
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 Pt took her last dose of prednisone today. Her blood sugars are more controlled and reports she feels less puffy. Energy levels are improved, she states she had to take a nap only twice since seeing her last. She has f/u with [facility] 4/18/2025 - lab orders placed for her to get drawn before her appt per request.        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

## 2025-04-07 ENCOUNTER — LAB ENCOUNTER (OUTPATIENT)
Dept: LAB | Age: 75
End: 2025-04-07
Attending: INTERNAL MEDICINE
Payer: MEDICARE

## 2025-04-07 DIAGNOSIS — R73.9 STEROID-INDUCED HYPERGLYCEMIA: ICD-10-CM

## 2025-04-07 DIAGNOSIS — Z79.52 CURRENT CHRONIC USE OF SYSTEMIC STEROIDS: ICD-10-CM

## 2025-04-07 DIAGNOSIS — T38.0X5A STEROID-INDUCED HYPERGLYCEMIA: ICD-10-CM

## 2025-04-07 LAB — CORTIS SERPL-MCNC: 7.6 UG/DL

## 2025-04-07 PROCEDURE — 82024 ASSAY OF ACTH: CPT

## 2025-04-07 PROCEDURE — 36415 COLL VENOUS BLD VENIPUNCTURE: CPT

## 2025-04-07 PROCEDURE — 82533 TOTAL CORTISOL: CPT

## 2025-04-08 ENCOUNTER — OFFICE VISIT (OUTPATIENT)
Facility: CLINIC | Age: 75
End: 2025-04-08
Payer: MEDICARE

## 2025-04-08 VITALS
HEIGHT: 61.1 IN | DIASTOLIC BLOOD PRESSURE: 60 MMHG | WEIGHT: 140.38 LBS | BODY MASS INDEX: 26.51 KG/M2 | OXYGEN SATURATION: 98 % | SYSTOLIC BLOOD PRESSURE: 146 MMHG | HEART RATE: 71 BPM | RESPIRATION RATE: 16 BRPM

## 2025-04-08 DIAGNOSIS — E27.49 SECONDARY ADRENAL INSUFFICIENCY (HCC): ICD-10-CM

## 2025-04-08 DIAGNOSIS — E11.29 TYPE 2 DIABETES MELLITUS WITH ALBUMINURIA (HCC): Primary | ICD-10-CM

## 2025-04-08 DIAGNOSIS — R80.9 TYPE 2 DIABETES MELLITUS WITH ALBUMINURIA (HCC): Primary | ICD-10-CM

## 2025-04-08 DIAGNOSIS — E11.65 TYPE 2 DIABETES MELLITUS WITH HYPERGLYCEMIA, WITHOUT LONG-TERM CURRENT USE OF INSULIN (HCC): ICD-10-CM

## 2025-04-08 LAB
ACTH: 8.6 PG/ML
AMB EXT GMI: 7.2 %

## 2025-04-08 PROCEDURE — 99215 OFFICE O/P EST HI 40 MIN: CPT | Performed by: STUDENT IN AN ORGANIZED HEALTH CARE EDUCATION/TRAINING PROGRAM

## 2025-04-08 PROCEDURE — 95249 CONT GLUC MNTR PT PROV EQP: CPT | Performed by: STUDENT IN AN ORGANIZED HEALTH CARE EDUCATION/TRAINING PROGRAM

## 2025-04-08 RX ORDER — HYDROCORTISONE SODIUM SUCCINATE 100 MG/2ML
100 INJECTION INTRAMUSCULAR; INTRAVENOUS AS NEEDED
Qty: 2 ML | Refills: 0 | Status: SHIPPED | OUTPATIENT
Start: 2025-04-08

## 2025-04-08 RX ORDER — HYDROCORTISONE 5 MG/1
TABLET ORAL
Qty: 270 TABLET | Refills: 2 | Status: SHIPPED | OUTPATIENT
Start: 2025-04-08

## 2025-04-08 NOTE — PROGRESS NOTES
Name: Kassi Childress  YOB: 1950  Report Period: 03/26/2025 - 04/08/2025 (14 days)  Generated: 04/08/2025  Time CGM Active: 96%      Glucose Statistics and Targets  Average Glucose: 158 mg/dL  Glucose Management Indicator (GMI): 7.1%  Glucose Variability (%CV): 33.3%  Target Range: 70 - 180 mg/dL      Time in Ranges  Very High: >250 mg/dL --- 6%  High: 181 - 250 mg/dL --- 19%  Target Range: 70 - 180 mg/dL --- 75%  Low: 54 - 69 mg/dL --- 0%  Very Low: <54 mg/dL --- 0%

## 2025-04-08 NOTE — PROGRESS NOTES
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 recognizing some of the words or phrases.          [1]   Allergies  Allergen Reactions    Latex RASH    Silicone RASH and ITCHING    Asacol [Mesalamine] RASH    Atenolol RASH    Dotarem [Gadoterate Meglumine] OTHER (SEE COMMENTS)     Vomiting, weak, muscle aches, chills, night sweats, headaches.     Wellbutrin [Bupropion] OTHER (SEE COMMENTS)     Tremor      Codeine NAUSEA AND VOMITING    Metformin DIARRHEA    Nickel RASH

## 2025-04-08 NOTE — PATIENT INSTRUCTIONS
Summary of today's visit:  Restart jardiance 10 mg x 4 weeks, after which increase to Jardiance 25 mg daily  Please review the following if you are taking JARDIANCE OR FARXIGA:  This medication will remove extra sugar out of blood into your urine. It can be dosed anytime of day, but morning is probably best time to take it as it can cause increased urination. Please drink at least 4 glasses of water daily to avoid dehydration. It does not cause low blood sugars (hypoglycemia). If you develop any low blood sugars, we will need to adjust other medications. Please call me if you develop any symptoms of urinary tract infection (burning with urination) or yeast infection (new rash itching or burning in the genital area).     -While you are taking this medication please be mindful of sick day protocol (for illness, if you are vomiting, excessively exercising/sweating, or alcohol intake) to avoid dehydration:   -Temporarily hold the medication if you are in a dehydrated state. You can call the clinic for further instruction if you are sick and can't remember which diabetes medication to hold.  -Check blood sugar levels more often while sick  -Seek medical help early if you develop any abdominal pain, nausea or vomiting.  -Okay to resume this medication once you are eating /drinking regularly and no longer dehydrated.    Surgery protocol:  If you are having surgery please hold at least 4 days prior to your procedure.. Once eating/drinking normally after surgery, ok to resume (this avoids dehydration).     3. Continue the Free Style johana  4. For Secondary Adrenal insufficiency: since cortisol levels were on the lower side, we will start hydrocortisone 5 mg pills - take 10 mg at 6 am - 9 am (when you wake up) and 5 mg at 2 pm     \"Adrenal insufficiency self-care instructions and sick day rules\"     - Adrenal insufficiency, which involves a deficiency in steroid hormones that are normally produced by the body, can result in  symptoms that include generalized and severe fatigue, malaise, dizziness, and low blood pressure.   Should you develop any of these symptoms, please immediately go to the ER if severe symptoms develop.    - Please refer to the following instructions for patients with adrenal insufficiency:  1. Please get an alert bracelet that says \"Adrenal insufficiency. Give stress doses of steroids in case of illness.\"     2. If you become nauseous and are unable to keep hydrocortisone down, you need to go to an emergency room to get hydrocortisone medication via IV.  We recommend you learn how to self-inject steroids at home intramuscularly (IM) in case you are unable to keep your steroid dose down or have an emergency.    3. If you are ill with a low-grade fever of  F, please double your dose of hydrocortisone. If you have a fever of >100 F, please triple your hydrocortisone dose for 3 days or until fever resolves.     4. If you are undergoing a planned medical procedure (such as minor surgery, colonoscopy) or a major dental procedure (tooth extraction, root canal etc) you should double your hydrocortisone dose the day before and the day of and the day after the procedure.    5. If a major surgery requiring general anesthesia is being planned, please notify your endocrinologist so that your endocrinologists can give instructions to the anesthesia team that will be taking care of you with regards to the amount of hydrocortisone to be given during surgery    https://[email or web address]/contents/adrenal-crisis-the-basics/print?search=adrenal%20insufficiency&source=search_result&selectedTitle=3~150&usage_type=default&display_rank=3    https://[email or web address]/contents/addisons-disease-the-basics?search=adrenal%20insufficiency&source=search_result&selectedTitle=1~150&usage_type=default&display_rank=1     Additional comments:   - If labs were ordered today, please complete prior to your follow up visit.   - Please let us know if  you require any refills at least 1 week prior to your medication running out. If you do run out of medication, please call our office ASAP to request refills (do not wait until your follow up).   - Please call our office if sugars at home are consistently greater than 250 or less than 70 for medication adjustment (do not wait until your follow up appointment).  Lab results and imaging will typically be reviewed at follow up appointments, or within 3-5 business days of ALL results being in if you do not have an appointment scheduled in the near future. Our office will contact you for any abnormal results requiring more urgent follow up or action.   The on-call pager is for urgent matters only. If you are a type 1 diabetic and run out of insulin after business hours 8AM-4PM, you may call the on-call pager for a refill to a 24 hour pharmacy. All other refill requests should be requested during business hours.      Return Visit:  [x]  Dr. Atkinson in 3 months for FU DM and steroids       HOW TO TREAT LOW BLOOD SUGAR (Hypoglycemia)  Low blood sugar= Less than 70, or if you start to have symptoms (below)  Symptoms: Shaking or trembling, fast heart rate, extreme hunger, sweating, confusion/difficulty concentrating, dizziness.    How to treat a low blood sugar if you are able to eat/drink: The Rule of 15/15  If you are using continuous glucose monitor that says you are low, but you do not have any symptoms, verify on fingerstick that your blood sugar is actually low before treating.   Eat 15 grams of carbs (see examples below)  Check your blood sugar after 15 minutes. If it’s still below your target range, have another serving.   Repeat these steps until it’s in your target range. Once it’s in range, if you're nervous about your sugar going low again, have a protein source (ie, a spoonful of peanut butter).   If you have a CGM you want to look for how your arrow has changed. If you arrow is pointed up or sideways after 15 min,  give your CGM more time OR check with a finger stick. Try not to eat more food until at least 15 min after the first BG check - otherwise you risk having a rebound high.  If you are experiencing symptoms and you are unable to check your blood glucose for any reason, treat the hypoglycemia.  If someone has a low blood sugar and is unconscious: Don’t hesitate to call 911. If someone is unconscious and glucagon is not available or someone does not know how to use it, call 911 immediately.     To treat a low, I recommend you carry with you easy, pre-portioned treatment for low blood sugars that are 15G of carbs:   - Children sized squeeze pouch applesauce (high fiber + carbs help prevent too high of a spike)  - Small children's sized juicebox- 15g carb --> 4oz juice box  - Glucose tablets from [employer], you can find them near diabetes supplies --> Note, you will need to eat 3-4 tablets to get to 15g of carbs  - Children sized fruit snack pack- look for one with 15 grams of total carbohydrate  - Choice of how to treat your low is important. Complex carbs, or foods that contain fats along with carbs (like chocolate) can slow the absorption of glucose and should NOT be used to treat an emergency low

## 2025-04-10 ENCOUNTER — HOSPITAL ENCOUNTER (OUTPATIENT)
Dept: NUCLEAR MEDICINE | Facility: HOSPITAL | Age: 75
Discharge: HOME | End: 2025-04-10
Attending: INTERNAL MEDICINE
Payer: MEDICARE

## 2025-04-10 LAB — GLUCOSE BLD-MCNC: 109 MG/DL (ref 70–99)

## 2025-04-10 PROCEDURE — 82962 GLUCOSE BLOOD TEST: CPT

## 2025-04-10 PROCEDURE — 78815 PET IMAGE W/CT SKULL-THIGH: CPT | Performed by: INTERNAL MEDICINE

## 2025-04-21 ENCOUNTER — LAB ENCOUNTER (OUTPATIENT)
Dept: LAB | Age: 75
End: 2025-04-21
Attending: INTERNAL MEDICINE
Payer: MEDICARE

## 2025-04-21 DIAGNOSIS — D47.2 IGM MONOCLONAL GAMMOPATHY OF UNCERTAIN SIGNIFICANCE: ICD-10-CM

## 2025-04-21 DIAGNOSIS — D59.11 WARM AUTOIMMUNE HEMOLYTIC ANEMIA (HCC): ICD-10-CM

## 2025-04-21 LAB
ALBUMIN SERPL-MCNC: 4.2 G/DL (ref 3.2–4.8)
ALBUMIN/GLOB SERPL: 1.9 {RATIO} (ref 1–2)
ALP LIVER SERPL-CCNC: 96 U/L (ref 55–142)
ALT SERPL-CCNC: 26 U/L (ref 10–49)
ANION GAP SERPL CALC-SCNC: 10 MMOL/L (ref 0–18)
AST SERPL-CCNC: 21 U/L (ref ?–34)
BASOPHILS # BLD AUTO: 0.11 X10(3) UL (ref 0–0.2)
BASOPHILS NFR BLD AUTO: 1.9 %
BILIRUB SERPL-MCNC: 0.6 MG/DL (ref 0.2–1.1)
BUN BLD-MCNC: 22 MG/DL (ref 9–23)
CALCIUM BLD-MCNC: 9.2 MG/DL (ref 8.7–10.6)
CHLORIDE SERPL-SCNC: 110 MMOL/L (ref 98–112)
CO2 SERPL-SCNC: 26 MMOL/L (ref 21–32)
CREAT BLD-MCNC: 1.18 MG/DL (ref 0.55–1.02)
EGFRCR SERPLBLD CKD-EPI 2021: 49 ML/MIN/1.73M2 (ref 60–?)
EOSINOPHIL # BLD AUTO: 0.14 X10(3) UL (ref 0–0.7)
EOSINOPHIL NFR BLD AUTO: 2.4 %
ERYTHROCYTE [DISTWIDTH] IN BLOOD BY AUTOMATED COUNT: 14.4 %
FASTING STATUS PATIENT QL REPORTED: YES
GLOBULIN PLAS-MCNC: 2.2 G/DL (ref 2–3.5)
GLUCOSE BLD-MCNC: 153 MG/DL (ref 70–99)
HCT VFR BLD AUTO: 36.6 % (ref 35–48)
HGB BLD-MCNC: 11.8 G/DL (ref 12–16)
IGA SERPL-MCNC: 46.5 MG/DL (ref 40–350)
IGM SERPL-MCNC: 417.5 MG/DL (ref 50–300)
IMM GRANULOCYTES # BLD AUTO: 0.21 X10(3) UL (ref 0–1)
IMM GRANULOCYTES NFR BLD: 3.7 %
IMMUNOGLOBULIN PNL SER-MCNC: 355 MG/DL (ref 650–1600)
LYMPHOCYTES # BLD AUTO: 0.5 X10(3) UL (ref 1–4)
LYMPHOCYTES NFR BLD AUTO: 8.7 %
MCH RBC QN AUTO: 29.7 PG (ref 26–34)
MCHC RBC AUTO-ENTMCNC: 32.2 G/DL (ref 31–37)
MCV RBC AUTO: 92.2 FL (ref 80–100)
MONOCYTES # BLD AUTO: 0.51 X10(3) UL (ref 0.1–1)
MONOCYTES NFR BLD AUTO: 8.9 %
NEUTROPHILS # BLD AUTO: 4.28 X10 (3) UL (ref 1.5–7.7)
NEUTROPHILS # BLD AUTO: 4.28 X10(3) UL (ref 1.5–7.7)
NEUTROPHILS NFR BLD AUTO: 74.4 %
OSMOLALITY SERPL CALC.SUM OF ELEC: 308 MOSM/KG (ref 275–295)
PLATELET # BLD AUTO: 247 10(3)UL (ref 150–450)
POTASSIUM SERPL-SCNC: 3.6 MMOL/L (ref 3.5–5.1)
PROT SERPL-MCNC: 6.4 G/DL (ref 5.7–8.2)
RBC # BLD AUTO: 3.97 X10(6)UL (ref 3.8–5.3)
SODIUM SERPL-SCNC: 146 MMOL/L (ref 136–145)
WBC # BLD AUTO: 5.8 X10(3) UL (ref 4–11)

## 2025-04-21 PROCEDURE — 36415 COLL VENOUS BLD VENIPUNCTURE: CPT

## 2025-04-21 PROCEDURE — 83521 IG LIGHT CHAINS FREE EACH: CPT

## 2025-04-21 PROCEDURE — 86334 IMMUNOFIX E-PHORESIS SERUM: CPT

## 2025-04-21 PROCEDURE — 80053 COMPREHEN METABOLIC PANEL: CPT

## 2025-04-21 PROCEDURE — 84075 ASSAY ALKALINE PHOSPHATASE: CPT

## 2025-04-21 PROCEDURE — 84080 ASSAY ALKALINE PHOSPHATASES: CPT

## 2025-04-21 PROCEDURE — 82784 ASSAY IGA/IGD/IGG/IGM EACH: CPT

## 2025-04-21 PROCEDURE — 84165 PROTEIN E-PHORESIS SERUM: CPT

## 2025-04-21 PROCEDURE — 85025 COMPLETE CBC W/AUTO DIFF WBC: CPT

## 2025-04-22 LAB
ALK PHOSPHATASE: 109 IU/L
BONE FRAC: 32 %
INTESTINAL FRAC: 0 %
LIVER FRAC: 68 %

## 2025-04-23 ENCOUNTER — HOSPITAL ENCOUNTER (OUTPATIENT)
Age: 75
Discharge: HOME | End: 2025-04-23
Payer: MEDICARE

## 2025-04-23 VITALS
HEART RATE: 70 BPM | HEIGHT: 62 IN | SYSTOLIC BLOOD PRESSURE: 149 MMHG | BODY MASS INDEX: 25.76 KG/M2 | TEMPERATURE: 98 F | WEIGHT: 140 LBS | DIASTOLIC BLOOD PRESSURE: 93 MMHG | OXYGEN SATURATION: 100 % | RESPIRATION RATE: 18 BRPM

## 2025-04-23 DIAGNOSIS — S61.210A LACERATION OF RIGHT INDEX FINGER WITHOUT FOREIGN BODY WITHOUT DAMAGE TO NAIL, INITIAL ENCOUNTER: Primary | ICD-10-CM

## 2025-04-23 LAB
ALBUMIN SERPL ELPH-MCNC: 3.92 G/DL (ref 3.75–5.21)
ALBUMIN/GLOB SERPL: 1.79 {RATIO} (ref 1–2)
ALPHA1 GLOB SERPL ELPH-MCNC: 0.31 G/DL (ref 0.19–0.46)
ALPHA2 GLOB SERPL ELPH-MCNC: 0.66 G/DL (ref 0.48–1.05)
B-GLOBULIN SERPL ELPH-MCNC: 0.87 G/DL (ref 0.68–1.23)
GAMMA GLOB SERPL ELPH-MCNC: 0.35 G/DL (ref 0.62–1.7)
KAPPA LC FREE SER-MCNC: 2.6 MG/DL (ref 0.33–1.94)
KAPPA LC FREE/LAMBDA FREE SER NEPH: 2.58 {RATIO} (ref 0.26–1.65)
LAMBDA LC FREE SERPL-MCNC: 1.01 MG/DL (ref 0.57–2.63)
PROT SERPL-MCNC: 6.1 G/DL (ref 5.7–8.2)

## 2025-04-23 PROCEDURE — 99213 OFFICE O/P EST LOW 20 MIN: CPT

## 2025-04-23 PROCEDURE — 12001 RPR S/N/AX/GEN/TRNK 2.5CM/<: CPT
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
## 2025-04-23 NOTE — DISCHARGE INSTRUCTIONS
Return to ED for redness, swelling, discharge, or pain at the site.   Keep wound clean and dry through the day. Wash with soap and water, rinse well, pat dry with towel, and apply vasoline and bandaid. Do this at least every 12 hours.   There will be a scar for 6 months which will improve over time. To prevent permanent scar formation, apply sun screen 15 minutes prior to going outside. Also you can use mederma once daily.

## 2025-04-28 ENCOUNTER — HOSPITAL ENCOUNTER (OUTPATIENT)
Dept: MAMMOGRAPHY | Age: 75
Discharge: HOME | End: 2025-04-28
Attending: INTERNAL MEDICINE
Payer: MEDICARE

## 2025-04-28 DIAGNOSIS — Z12.31 ENCOUNTER FOR SCREENING MAMMOGRAM FOR MALIGNANT NEOPLASM OF BREAST: ICD-10-CM

## 2025-04-28 PROCEDURE — 77067 SCR MAMMO BI INCL CAD: CPT | Performed by: INTERNAL MEDICINE

## 2025-04-28 PROCEDURE — 77063 BREAST TOMOSYNTHESIS BI: CPT | Performed by: INTERNAL MEDICINE

## 2025-05-15 ENCOUNTER — APPOINTMENT (OUTPATIENT)
Age: 75
End: 2025-05-15
Attending: INTERNAL MEDICINE
Payer: MEDICARE

## 2025-05-19 ENCOUNTER — OFFICE VISIT (OUTPATIENT)
Age: 75
End: 2025-05-19
Attending: INTERNAL MEDICINE
Payer: MEDICARE

## 2025-05-19 ENCOUNTER — NURSE ONLY (OUTPATIENT)
Age: 75
End: 2025-05-19
Attending: INTERNAL MEDICINE
Payer: MEDICARE

## 2025-05-19 VITALS
OXYGEN SATURATION: 99 % | HEART RATE: 74 BPM | SYSTOLIC BLOOD PRESSURE: 163 MMHG | DIASTOLIC BLOOD PRESSURE: 91 MMHG | WEIGHT: 143.19 LBS | HEIGHT: 61.1 IN | BODY MASS INDEX: 27.03 KG/M2 | TEMPERATURE: 97 F | RESPIRATION RATE: 16 BRPM

## 2025-05-19 DIAGNOSIS — D59.11 WARM AUTOIMMUNE HEMOLYTIC ANEMIA (HCC): ICD-10-CM

## 2025-05-19 LAB
ALBUMIN SERPL-MCNC: 4.5 G/DL (ref 3.2–4.8)
ALBUMIN/GLOB SERPL: 2.1 {RATIO} (ref 1–2)
ALP LIVER SERPL-CCNC: 94 U/L (ref 55–142)
ALT SERPL-CCNC: 26 U/L (ref 10–49)
ANION GAP SERPL CALC-SCNC: 8 MMOL/L (ref 0–18)
AST SERPL-CCNC: 24 U/L (ref ?–34)
BASOPHILS # BLD AUTO: 0.09 X10(3) UL (ref 0–0.2)
BASOPHILS NFR BLD AUTO: 1.5 %
BILIRUB DIRECT SERPL-MCNC: 0.2 MG/DL (ref ?–0.3)
BILIRUB SERPL-MCNC: 0.9 MG/DL (ref 0.2–1.1)
BUN BLD-MCNC: 16 MG/DL (ref 9–23)
CALCIUM BLD-MCNC: 9.4 MG/DL (ref 8.7–10.6)
CHLORIDE SERPL-SCNC: 111 MMOL/L (ref 98–112)
CO2 SERPL-SCNC: 26 MMOL/L (ref 21–32)
CREAT BLD-MCNC: 1.17 MG/DL (ref 0.55–1.02)
EGFRCR SERPLBLD CKD-EPI 2021: 50 ML/MIN/1.73M2 (ref 60–?)
EOSINOPHIL # BLD AUTO: 0.25 X10(3) UL (ref 0–0.7)
EOSINOPHIL NFR BLD AUTO: 4.1 %
ERYTHROCYTE [DISTWIDTH] IN BLOOD BY AUTOMATED COUNT: 13.3 %
FASTING STATUS PATIENT QL REPORTED: NO
GLOBULIN PLAS-MCNC: 2.1 G/DL (ref 2–3.5)
GLUCOSE BLD-MCNC: 126 MG/DL (ref 70–99)
HAPTOGLOB SERPL-MCNC: 79 MG/DL (ref 30–200)
HCT VFR BLD AUTO: 38.9 % (ref 35–48)
HGB BLD-MCNC: 12.8 G/DL (ref 12–16)
HGB RETIC QN AUTO: 32 PG (ref 28.2–36.6)
IMM GRANULOCYTES # BLD AUTO: 0.06 X10(3) UL (ref 0–1)
IMM GRANULOCYTES NFR BLD: 1 %
IMM RETICS NFR: 0.14 RATIO (ref 0.1–0.3)
LDH SERPL L TO P-CCNC: 224 U/L (ref 120–246)
LYMPHOCYTES # BLD AUTO: 0.41 X10(3) UL (ref 1–4)
LYMPHOCYTES NFR BLD AUTO: 6.7 %
MCH RBC QN AUTO: 29.2 PG (ref 26–34)
MCHC RBC AUTO-ENTMCNC: 32.9 G/DL (ref 31–37)
MCV RBC AUTO: 88.8 FL (ref 80–100)
MONOCYTES # BLD AUTO: 0.5 X10(3) UL (ref 0.1–1)
MONOCYTES NFR BLD AUTO: 8.2 %
NEUTROPHILS # BLD AUTO: 4.81 X10 (3) UL (ref 1.5–7.7)
NEUTROPHILS # BLD AUTO: 4.81 X10(3) UL (ref 1.5–7.7)
NEUTROPHILS NFR BLD AUTO: 78.5 %
OSMOLALITY SERPL CALC.SUM OF ELEC: 303 MOSM/KG (ref 275–295)
PLATELET # BLD AUTO: 216 10(3)UL (ref 150–450)
POTASSIUM SERPL-SCNC: 4.1 MMOL/L (ref 3.5–5.1)
PROT SERPL-MCNC: 6.6 G/DL (ref 5.7–8.2)
RBC # BLD AUTO: 4.38 X10(6)UL (ref 3.8–5.3)
RETICS # AUTO: 88.9 X10(3) UL (ref 22.5–147.5)
RETICS/RBC NFR AUTO: 2 % (ref 0.5–2.5)
SODIUM SERPL-SCNC: 145 MMOL/L (ref 136–145)
WBC # BLD AUTO: 6.1 X10(3) UL (ref 4–11)

## 2025-05-19 NOTE — PROGRESS NOTES
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 She reports her last fall was about one month ago. She said it was noted that she had 3 rib fractures on recent PET. Denies pain or SOB. She is taking Hydrocortisone 10mg in the AM and 5mg in the PM per endo. She was in the ED 4/23 for a cut in her finger from a knife while cooking, resolved now.

## 2025-05-19 NOTE — PROGRESS NOTES
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 are noted.  In the aspirate, only scattered small lymphocytes and plasma cells are noted.  Special stain for iron shows adequate storage iron.  No ring sideroblasts are seen.    The core biopsy is adequate for evaluation.  The bone marrow is hypercellular for age with an estimated cellularity of 50%.  Erythroid precursors appear increased.  Myeloid and megakaryocytic elements are present and adequate to slightly increased numbers.  There are small well-circumscribed, nonparatrabecular lymphoid aggregates.  Immunoperoxidase stains were performed to further evaluate the core biopsy and clot section.  CD3 and CD20 highlight scattered interstitial small lymphocytes and demonstrate that the lymphoid aggregates are composed of a mixture of T and B lymphocytes.  Based on CD20 stain, the B cell infiltrate comprises less than 5% of bone marrow cellularity.  Cyclin D1 is negative within lymphocytes.  CD34 and  show no significant increase in blasts.   highlights occasional plasma cells which comprise less than 5% of bone marrow cellularity.  Sheets of plasma cells are not seen.  In situ hybridization for kappa and lambda demonstrates that the  plasma cells are kappa light chain restricted.  Special stain for reticulin demonstrates no significant reticulin fibrosis.    Flow cytometry immunophenotyping studies were performed on the submitted bone marrow aspirate.  The lymphoid population is composed predominantly of T cells with occasional B cells and NK cells.  Although B cells comprise a minority of the lymphoid population, the B cells are monotypic based on kappa surface immunoglobulin light chain restriction.  The monotypic B cells are CD19 positive, CD20 positive, CD5 negative, and CD10 negative.  T cells show no significant antigen deletion with the markers assayed.  The CD4-CD8 ratio is unremarkable.  Based on selective gating, there is a small, aberrant population of bright CD38 positive, CD56 positive plasma cells ( less than 20 events) that shows a marked kappa cytoplasmic immunoglobulin light predominance.      Differential considerations for the small monotypic B-cell and plasma cell infiltrates in the setting of IgM kappa gammopathy include IgM monoclonal gammopathy of undetermined significance and focal bone marrow involvement by a lymphoplasmacytic lymphoma.  The histologic features favor IgM gammopathy of undetermined significance.  Clinical and radiographic correlation is suggested.           Differential considerations for the hypercellular bone marrow with erythroid predominance and mild megaloblastoid change include reactive conditions (nutritional deficiency, toxins, autoimmune conditions, infection, or hemolysis) as well as an evolving low-grade myelodysplastic syndrome.  Based on morphology, a reactive etiology is favored.  Correlation with clinical findings and pending conventional cytogenetic studies/ molecular studies is suggested.     Dr. Goldberg has reviewed the case and concurs.         Ancillary Studies      Bone Marrow Microscopic Description:  CBC: January 6, 2025  WBC (103/ul) 7.4   RBC (106/ul) 2.38   HGB (gm/dl) 8.4   HCT  (%) 25   PLATELET (103/uL) 54   MCV (fL) 105   MCH (pg) 35.3   MCHC (gm/dL) 33.6   RDW (%) 21.9   Neutrophils (103/uL) 5.07   Lymphocytes (103/uL) 0.85   Monocytes (103/uL) 0.40   Eosinophils (103/uL) 0.68   Basophils (103/uL) 0.12      Peripheral Blood Smear Interpretation: A recent peripheral blood smear is not available for review at the time of diagnosis.  Bone Marrow Aspirate, Clot, Biopsy And Touch Preps:   Adequacy: The bone marrow aspirate smears are cellular and adequate for evaluation.  The core biopsy consists of trabecular bone and adequate bone marrow.  Aspirate Differential (Percent of 500-cell count):      Granulocytes 41   Blasts <1   Normoblasts 52   Lymphocytes 5   Monocytes <1   Eosinophils 2   Basophils <1   Plasma Cells <1      Touch Preps: The touch preparations contain myeloid, erythroid and megakaryocytic elements  Clot: The particle clot section consists of cellular particles with myeloid erythroid and megakaryocytic elements  Cellularity:  ASPIRATE: Active  BIOPSY %: The core biopsy is hypercellular for age with an estimated cellularity of 50%.  Erythroid:  CELLULARITY: Increased  MORPHOLOGY: The erythroid series shows mild megaloblastoid changes.  Myeloid:  CELLULARITY: Adequate to slightly increased  MORPHOLOGY: The myeloid series shows progressive maturation.  Blasts are not increased  Megakaryocyte:  CELLULARITY: Adequate  MORPHOLOGY: The majority of megakaryocytes are unremarkable.  Rare small hypolobated megakaryocytes are noted  Lymphocytes: Lymphocytes are small and mature appearing.  There are small well-circumscribed nonparatrabecular lymphoid aggregates composed of small mature appearing lymphocytes.  Plasma Cells: Plasma cells are not significantly increased.  The plasma cells are small with condensed chromatin.  Based on immunohistochemistry, the plasma cells comprise less than 5% of bone marrow cellularity.  Sheets of plasma cells are not identified.  Bony Trabeculae:  Unremarkable  Immunohistochemistry and Special Stains:     Immunohistochemistry:     Material:  Block A1/B1  Population:  Tissue     Antibody                          Result  CD3                                   See Comment  CD20                                See Comment  CD34                                See Comment                                See Comment                                    See Comment  Cyclin D1                         See Comment           Medical Necessity    Immunohistochemical stains were performed:                  See Comment                   Positive tissue controls were utilized in the staining process.  These slides were reviewed by the signout Pathologist and showed appropriate staining results.     Interpreted by:  Juna Lucas MD     Methodology:         Immunohistochemical stains are performed on formalin-fixed, paraffin-embedded tissue sections.  Deparaffinization, antigen retrieval, and staining utilizes the automated Leica Goode III immunohistochemistry platform.  A proprietary, non-biotin, polymer-based detection system (Bond Polymer Refine DetectionTM ) is employed.  All antibodies are validated by [facility] Department of Pathology to document appropriate staining reactions.  Positive controls are utilized and show appropriate reactivity.     Special Stain(s):     Material:  Block A and Aspirate   Population:  Tissue     Stain                                Result  Iron                                    See Comment   Retic                                 See Comment                                              Positive tissue controls were utilized in the staining process.  These slides were reviewed by the signout Pathologist and showed appropriate staining results.     Interpreted by: Juan Lucas MD            Impression & Plan: Mrs. Childress is a 75 y/o F with PMHx of mildly elevated liver tests (+ smooth muscle antibody), HTN, HLD, Type 2 DM with  albuminuria, basal cell carcinoma of skin of face s/p resection who presents for evaluation of anemia and thrombocytopenia. She was found to have IgM MGUS, CCUS, and warm autoimmune hemolytic anemia.       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   - hemolysis labs 1/7/25: reticulocyte % 16.5, retic absolute 405 (very elevated),  (elevated), Blood bank Antibody screen positive with Warm IgG antibodies. ALMA positive for poly & IgG (+4); negative for C3D, Tbili 3.3 with indirect bili 2.2, haptoglobin < 10  - labs 1/23/25: , tbili 6.2 , indirect bilirubin 5.6, haptoglobin < 10, Hb 7.7 g/dL, hematocrit 22.1, plt 145 (improved from 54), retic 13.6% and absolute retic 301, ALMA Poly positive, IgG positive, C3D Negative  - 1/23/25: started prednisone 70 mg daily (1 mg/kg/daily) along with GI stress ulcer prophylaxis  - 2/10/25: She was seen at [facility] for second opinion on 2/10/25 by Dr. Tejinder Pineda (hematology/oncology), who agreed with my workup and management. He did have a suggestion to add 4-week course of rituximab, since it has been associated with longer chances of remission.  She has follow up with Dr. Pineda in April 2025. I greatly appreciate his input.  - 2/27/25: C1D1 Rituximab 375 mg/m2  - 3/6/25: C1D8 Rituximab 375 mg/m2  - 3/13/25: C1D15 Rituximab 375 mg/m2  - 3/20/25: C1D15 Rituximab 375 mg/m2    Steroids were tapered as follows:  1/31 - 2/6: prednisone 60 mg daily  2/7 - 2/13: prednisone 50 mg daily  2/14- 2/20: prednisone 40 mg daily  2/21- 2/27: prednisone 30 mg daily  2/28 - 3/6: prednisone 20 mg daily  3/7 - 3/13: prednisone 10 mg daily  3/14 - 3/20: prednisone 7.5 mg daily  3/21 - 3/27: prednisone 5.0 mg daily  3/28 - 4/3: prednisone 2.5 mg daily  4/4/2025: OFF steroids.     Recommendations:  TODAY 5-19-25:  - Labs today show  stable Hb at 12.8 g/dL. Platelets stable at 216k. Hemolysis labs from today are pending.   - hemolysis labs q 3 months      IgM Monoclonal Gammopathy of Undetermined Significance (IgM MGUS), High-Intermediate risk MGUS -- Improved with steroids           Clonal Cytopenia of Undetermined Significance (CCUS) diagnosed 1/29/25 -- given anemia, thrombocytopenia, and TET2 mutation    Severe Fatigue (Improved), Peripheral Neuropathy, Postural Hypotension    Differential diagnosis: Lymphoplasmacytic lymphoma, Waldenstrom's macroglobulinemia, non-secretory myeloma, evolving low-grade MDS.    Workup:  Labs 1/7/25   Blood counts:  Hb 8.4, hematocrit 25, plt 54 k, , absolute lymphocytes 850   Citrated platelet count 79.2. confirms true thrombocytopenia with plt < 150k  , uric acid 5.9  SPEP with PARIS: kappa free light chain 6.589, lambda normal 2.1, kappa/lambda ratio 3.02, beta globulins 1.25. Monoclonal IgM kappa seen (monoclonal spike in beta region)   QI: IgA 81.2, IgG 914, IgM 917.2 (elevated)  Beta-2 microglobulin: 5.97  Cryoglobulins: none detected  PN[facility] panel: no evidence of PNH  KWESI IFA Screen: Negative  HbSAg negative, HCV Ab nonreactive, Hep B surface Ab reactive,  HIV negative, Hep B Core Ab nonreactive  CT C/A/P with stable small RUL pulmonary nodule 5 mm, nodularity on liver (6 x 5 mm) sable from last time, and nabeel hepatis lymph node 18 x 11 mm decreased in size from previous, portal caval lymph node 25 x 14 mm (stable since previous study), aortocaval lymph node 5 x 5 mm stable    BMBx 1/16/25   Small monotypic lymphoplasmacytic infiltrate, consistent with IgM monoclonal gammopathy of undetermined significance.  Background hypercellular bone marrow with multilineage hematopoiesis,  erythroid predominance, and mild megaloblastoid change.    Cytogenetics: normal karyotype  Special stains: negative for congo red stain.     1/23/25: I spoke to our pathologist Dr. Lucas in detail regarding this  patient's Bone marrow biopsy results. She has < 5 % plasma cells in the bone marrow, excluding the diagnosis of multiple myeloma at this time. She does have IgM MGUS with hypercellular bone marrow (50% cellular).  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 Gly1 361Arg, Frequency 24%    1/23/25: Started prednisone 70 mg PO every day (1mg/kg/daily) along with daily pantoprazole for stress ulcer prophylaxis.     1/28/25 Labs:  SPEP with monoclonal spike in beta region. Monoclonal IgM kappa. Kappa free light chains 2.752 , lambda 0.848, k/l ratio 3.25 (up from 3.02)  IgM decreased to 536.4 after initiating prednisone  Hemolysis labs improving (reticulocyte, LDH, haptoglobin)  proBNP elevated at 1677  High sensitivity troponin <3  EKG with ,  (mildly low)  2D echo: EF 65-70%, no mention of abnormal global longitudinal strain  TSH, prolcatin, estradiol, testosterone are normal -- making POEMS less likely given lack of endocrinopathy. Her fasting glucose is high because she is on a steroid course    2/10/25: Patient seen by Dr. Tejinder Pineda at [facility] for second opinion. He does not think patient has Waldenstrom's since cytopenias in WM are usually due to bulky marrow involvement --which patient does not have. I agree with his assessment.   Labs at [facility] with negative cold agglutinin titer, Kappa/lambda 2.47 (elevated ) with kappa 2.10 and lambda light chain 0.85, QI with IgM 366 (High)/IgA 36(low)/IgG 514 (low), SPEP with Monoclonal M-protein of 0.3 g/dL      3/12/25 Given her persistent bilateral finger neuropathy, I have referred to neurology for EMG.  She saw neurology recently, Dr. Bey on 3/12/25. EMG showed mildly abnormal study, due to isolated reduced amplitude of the right sural sensory response. He states \"here is,  however, no evidence for a widespread polyneuropathy affecting upper and lower extremities and no suggestion for a primary demyelinating neuropathy, or atypical neuropathy, or myopathy.  Of note, testing was somewhat limited by electrical artifact as well; clinical correlation is advised as to the etiology of these findings.\"     - Patient has CCUS as documented above, given she had clear evidence of anemia, thrombocytopenia, and TET-2 mutation with frequency 24% (> 2%). Low-grade evolving MDS is part of the differential, but BMBx does not show this.    4/21/25: SPEP with monoclonal spike IgM kappa. IgM 417.5    5/01/25: PET-CT with physicologic FDG activity throughout body. No evidence of lytic lesions.    Patient's IgM level has decreased steadily after starting systemic therapy (steroid course and 4 doses of rituximab).     Recommendations:   Monitor labs every 3 months: SPEP with PARIS, QI, kappa/lambda ratio (next due ~7/16/25)      3. 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 CKD stage 3a  - baseline Cr 1-1.3  - following with nephrology, I appreciate their recommendations      5. Uncontrolled HTN  - Patient's systolic BP has been consistently > 160 since mid-March 2025. Possibly related to steroids? Advised keeping BP log and following up with PCP ASAP for titration of medications.         Follow-Up: Return to clinic 2 months for repeat labs (CBC with diff, hemolysis labs, QI, SPEP) and visit with me.     Beth Ojeda D.O.  [facility]

## 2025-06-02 ENCOUNTER — PATIENT MESSAGE (OUTPATIENT)
Dept: INTERNAL MEDICINE CLINIC | Facility: CLINIC | Age: 75
End: 2025-06-02

## 2025-06-02 ENCOUNTER — LAB ENCOUNTER (OUTPATIENT)
Dept: LAB | Age: 75
End: 2025-06-02
Attending: INTERNAL MEDICINE
Payer: MEDICARE

## 2025-06-02 DIAGNOSIS — E11.59 HYPERTENSION ASSOCIATED WITH DIABETES (HCC): ICD-10-CM

## 2025-06-02 DIAGNOSIS — I15.2 HYPERTENSION ASSOCIATED WITH DIABETES (HCC): ICD-10-CM

## 2025-06-02 LAB
CHOLEST SERPL-MCNC: 137 MG/DL (ref ?–200)
CREAT UR-SCNC: 25.3 MG/DL
FASTING PATIENT LIPID ANSWER: YES
HDLC SERPL-MCNC: 68 MG/DL (ref 40–59)
LDLC SERPL CALC-MCNC: 51 MG/DL (ref ?–100)
MICROALBUMIN UR-MCNC: 0.5 MG/DL
MICROALBUMIN/CREAT 24H UR-RTO: 19.8 UG/MG (ref ?–30)
NONHDLC SERPL-MCNC: 69 MG/DL (ref ?–130)
TRIGL SERPL-MCNC: 96 MG/DL (ref 30–149)
VLDLC SERPL CALC-MCNC: 14 MG/DL (ref 0–30)

## 2025-06-02 PROCEDURE — 82570 ASSAY OF URINE CREATININE: CPT

## 2025-06-02 PROCEDURE — 82043 UR ALBUMIN QUANTITATIVE: CPT

## 2025-06-02 PROCEDURE — 36415 COLL VENOUS BLD VENIPUNCTURE: CPT

## 2025-06-02 PROCEDURE — 80061 LIPID PANEL: CPT

## 2025-06-03 NOTE — TELEPHONE ENCOUNTER
Future Appointments   Date Time Provider Department Center   6/6/2025  8:30 AM Mary Pickard MD EMG 8 EMG [facility] [place]   6/11/2025 11:00 AM Nicho Bey MD [id number] EMG [place]   7/8/2025 10:00 AM Sudarshan Atkinson MD [id number] EMG [place]   7/24/2025 10:00 AM NP OOT NP SW Inf [place] [facility]   7/24/2025 10:30 AM Beth Ojeda DO NP SW Hem[facility] [place] [facility]

## 2025-06-06 ENCOUNTER — OFFICE VISIT (OUTPATIENT)
Dept: INTERNAL MEDICINE CLINIC | Facility: CLINIC | Age: 75
End: 2025-06-06
Payer: MEDICARE

## 2025-06-06 VITALS
HEART RATE: 71 BPM | SYSTOLIC BLOOD PRESSURE: 162 MMHG | DIASTOLIC BLOOD PRESSURE: 80 MMHG | WEIGHT: 146.19 LBS | TEMPERATURE: 98 F | OXYGEN SATURATION: 97 % | BODY MASS INDEX: 28 KG/M2 | RESPIRATION RATE: 11 BRPM

## 2025-06-06 DIAGNOSIS — D59.11 WARM AUTOIMMUNE HEMOLYTIC ANEMIA (HCC): ICD-10-CM

## 2025-06-06 DIAGNOSIS — D47.2 MGUS (MONOCLONAL GAMMOPATHY OF UNKNOWN SIGNIFICANCE): ICD-10-CM

## 2025-06-06 DIAGNOSIS — E11.59 HYPERTENSION ASSOCIATED WITH DIABETES (HCC): Primary | ICD-10-CM

## 2025-06-06 DIAGNOSIS — E78.5 HYPERLIPIDEMIA ASSOCIATED WITH TYPE 2 DIABETES MELLITUS (HCC): ICD-10-CM

## 2025-06-06 DIAGNOSIS — I15.2 HYPERTENSION ASSOCIATED WITH DIABETES (HCC): Primary | ICD-10-CM

## 2025-06-06 DIAGNOSIS — E27.49 SECONDARY ADRENAL INSUFFICIENCY (HCC): ICD-10-CM

## 2025-06-06 DIAGNOSIS — E11.69 HYPERLIPIDEMIA ASSOCIATED WITH TYPE 2 DIABETES MELLITUS (HCC): ICD-10-CM

## 2025-06-06 DIAGNOSIS — D75.9 CLONAL CYTOPENIA OF UNDETERMINED SIGNIFICANCE (CCUS): ICD-10-CM

## 2025-06-06 PROCEDURE — G2211 COMPLEX E/M VISIT ADD ON: HCPCS | Performed by: INTERNAL MEDICINE

## 2025-06-06 PROCEDURE — 99215 OFFICE O/P EST HI 40 MIN: CPT | Performed by: INTERNAL MEDICINE

## 2025-06-06 RX ORDER — AMLODIPINE BESYLATE 5 MG/1
5 TABLET ORAL DAILY
Qty: 30 TABLET | Refills: 3 | Status: SHIPPED | OUTPATIENT
Start: 2025-06-06 | End: 2026-06-01
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
## 2025-06-06 NOTE — PATIENT INSTRUCTIONS
You are due for your diabetic eye exam.     For your high blood pressure please continue losartan every evening and ADD amlodipine 5 mg every morning. Please send me your home pressures in 14 days.

## 2025-06-11 ENCOUNTER — OFFICE VISIT (OUTPATIENT)
Dept: NEUROLOGY | Facility: CLINIC | Age: 75
End: 2025-06-11
Payer: MEDICARE

## 2025-06-11 ENCOUNTER — TELEPHONE (OUTPATIENT)
Facility: CLINIC | Age: 75
End: 2025-06-11

## 2025-06-11 VITALS
HEART RATE: 82 BPM | RESPIRATION RATE: 16 BRPM | SYSTOLIC BLOOD PRESSURE: 131 MMHG | DIASTOLIC BLOOD PRESSURE: 64 MMHG | BODY MASS INDEX: 27.56 KG/M2 | WEIGHT: 146 LBS | HEIGHT: 61.1 IN

## 2025-06-11 DIAGNOSIS — R20.8 DECREASED VIBRATORY SENSE: ICD-10-CM

## 2025-06-11 DIAGNOSIS — D47.2 MGUS (MONOCLONAL GAMMOPATHY OF UNKNOWN SIGNIFICANCE): Primary | ICD-10-CM

## 2025-06-11 DIAGNOSIS — R20.0 NUMBNESS IN FEET: ICD-10-CM

## 2025-06-11 PROCEDURE — 99213 OFFICE O/P EST LOW 20 MIN: CPT | Performed by: OTHER

## 2025-06-11 NOTE — TELEPHONE ENCOUNTER
CGM reviewed - it appears the patient has started using Lispro 10 units before meals over the past few days. We’ll monitor her response to this regimen, particularly as we are also tapering her hydrocortisone dose. This will help assess how her blood glucose levels respond to the current insulin plan. Plan to review CGM data again in 2 weeks.

## 2025-06-11 NOTE — PATIENT INSTRUCTIONS
Refill policies:    Allow 2-3 business days for refills; controlled substances may take longer.  Contact your pharmacy at least 5 days prior to running out of medication and have them send an electronic request or submit request through the “request refill” option in your [employer] Live Chat account.  Refills are not addressed on weekends; covering physicians do not authorize routine medications on weekends.  No narcotics or controlled substances are refilled after noon on Fridays or by on call physicians.  By law, narcotics must be electronically prescribed.  A 30 day supply with no refills is the maximum allowed.  If your prescription is due for a refill, you may be due for a follow up appointment.  To best provide you care, patients receiving routine medications need to be seen at least once a year.  Patients receiving narcotic/controlled substance medications need to be seen at least once every 3 months.  In the event that your preferred pharmacy does not have the requested medication in stock (e.g. Backordered), it is your responsibility to find another pharmacy that has the requested medication available.  We will gladly send a new prescription to that pharmacy at your request.    Scheduling Tests:    If your physician has ordered radiology tests such as MRI or CT scans, please contact Central Scheduling at [phone] right away to schedule the test.  Once scheduled, the [facility] Centralized Referral Team will work with your insurance carrier to obtain pre-certification or prior authorization.  Depending on your insurance carrier, approval may take 3-10 days.  It is highly recommended patients assure they have received an authorization before having a test performed.  If test is done without insurance authorization, patient may be responsible for the entire amount billed.      Precertification and Prior Authorizations:  If your physician has recommended that you have a procedure or additional testing performed the [facility]  Centralized Referral Team will contact your insurance carrier to obtain pre-certification or prior authorization.    You are strongly encouraged to contact your insurance carrier to verify that your procedure/test has been approved and is a COVERED benefit.  Although the [facility] Centralized Referral Team does its due diligence, the insurance carrier gives the disclaimer that \"Although the procedure is authorized, this does not guarantee payment.\"    Ultimately the patient is responsible for payment.   Thank you for your understanding in this matter.  Paperwork Completion:  If you require FMLA or disability paperwork for your recovery, please make sure to either drop it off or have it faxed to our office at [phone]. Be sure the form has your name and date of birth on it.  The form will be faxed to our Forms Department and they will complete it for you.  There is a 25$ fee for all forms that need to be filled out.  Please be aware there is a 10-14 day turnaround time.  You will need to sign a release of information (JARRED) form if your paperwork does not come with one.  You may call the Forms Department with any questions at [phone].  Their fax number is [phone].

## 2025-06-11 NOTE — TELEPHONE ENCOUNTER
Patient gained 10 lbs since she started taking the hydrocortisone. She is taking no additional steroids. She will follow new schedule you planned out. She was currently taking them at 8am and 2 pm. She sets an alarm to take on time .Sent patient a my chart message with the new plan. Patient understood.     Diabetes medications: Insulin asparte as needed. 10 units Patient been having more high blood sugars.
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
## 2025-06-11 NOTE — TELEPHONE ENCOUNTER
Dear team, received a msg from Dr. Pickard that Patty is struggling on hydrocortisone with uncontrolled HTN, lethargy,irritability etc.    Could you kindly check the following with Patty?    -if she is taking any additional steroids apart from hydrocortisone?    -confirm the timings of Hydrocortisone use. She should be using 10 mg @ 8 am or whenever she wakes up and 5 mg at 2pm (taking it later than 2 pm may cause insomnia and sleep disturbances)    - If Patty is not taking any additional steroids apart from hydrocortisone and taking it at appropriate times please have her reduce her hydrocortisone dose in the following manner:    Week 1 and 2: Take hydrocortisone 7.5 mg at 8 am and 5 mg at 2 pm  Week 3 and 4: Take hydrocortisone 7.5 mg at 8 am and 2.5 mg at 2 pm  Week 5 and 6: Take hydrocortisone 5 mg at 8 am and 2.5 mg at 2 pm    I am seeing her next month and hopefully we can discuss more at next appointment.    Thanks

## 2025-07-21 ENCOUNTER — LAB ENCOUNTER (OUTPATIENT)
Dept: LAB | Age: 75
End: 2025-07-21
Attending: STUDENT IN AN ORGANIZED HEALTH CARE EDUCATION/TRAINING PROGRAM
Payer: MEDICARE

## 2025-07-21 DIAGNOSIS — E27.49 SECONDARY ADRENAL INSUFFICIENCY (HCC): ICD-10-CM

## 2025-07-21 LAB
ALBUMIN SERPL-MCNC: 4.4 G/DL (ref 3.2–4.8)
CORTIS SERPL-MCNC: 11.9 UG/DL

## 2025-07-21 PROCEDURE — 82024 ASSAY OF ACTH: CPT

## 2025-07-21 PROCEDURE — 82533 TOTAL CORTISOL: CPT

## 2025-07-21 PROCEDURE — 36415 COLL VENOUS BLD VENIPUNCTURE: CPT

## 2025-07-21 PROCEDURE — 82040 ASSAY OF SERUM ALBUMIN: CPT

## 2025-07-22 LAB — ACTH: 13 PG/ML

## 2025-07-23 ENCOUNTER — RESULTS FOLLOW-UP (OUTPATIENT)
Facility: CLINIC | Age: 75
End: 2025-07-23

## 2025-07-24 ENCOUNTER — NURSE ONLY (OUTPATIENT)
Facility: LOCATION | Age: 75
End: 2025-07-24
Attending: INTERNAL MEDICINE
Payer: MEDICARE

## 2025-07-24 ENCOUNTER — OFFICE VISIT (OUTPATIENT)
Facility: LOCATION | Age: 75
End: 2025-07-24
Attending: INTERNAL MEDICINE
Payer: MEDICARE

## 2025-07-24 VITALS
BODY MASS INDEX: 28 KG/M2 | HEART RATE: 79 BPM | RESPIRATION RATE: 18 BRPM | TEMPERATURE: 98 F | SYSTOLIC BLOOD PRESSURE: 129 MMHG | WEIGHT: 150 LBS | OXYGEN SATURATION: 98 % | DIASTOLIC BLOOD PRESSURE: 57 MMHG

## 2025-07-24 DIAGNOSIS — D59.11 WARM AUTOIMMUNE HEMOLYTIC ANEMIA (HCC): ICD-10-CM

## 2025-07-24 DIAGNOSIS — D47.2 IGM MONOCLONAL GAMMOPATHY OF UNCERTAIN SIGNIFICANCE: ICD-10-CM

## 2025-07-24 LAB
ALBUMIN SERPL-MCNC: 4.6 G/DL (ref 3.2–4.8)
ALBUMIN/GLOB SERPL: 2.1 {RATIO} (ref 1–2)
ALP LIVER SERPL-CCNC: 128 U/L (ref 55–142)
ALT SERPL-CCNC: 27 U/L (ref 10–49)
ANION GAP SERPL CALC-SCNC: 9 MMOL/L (ref 0–18)
AST SERPL-CCNC: 27 U/L (ref ?–34)
BASOPHILS # BLD AUTO: 0.12 X10(3) UL (ref 0–0.2)
BASOPHILS NFR BLD AUTO: 1.8 %
BILIRUB DIRECT SERPL-MCNC: 0.1 MG/DL (ref ?–0.3)
BILIRUB SERPL-MCNC: 0.5 MG/DL (ref 0.2–1.1)
BUN BLD-MCNC: 25 MG/DL (ref 9–23)
CALCIUM BLD-MCNC: 9.3 MG/DL (ref 8.7–10.6)
CHLORIDE SERPL-SCNC: 109 MMOL/L (ref 98–112)
CO2 SERPL-SCNC: 26 MMOL/L (ref 21–32)
CREAT BLD-MCNC: 1.19 MG/DL (ref 0.55–1.02)
EGFRCR SERPLBLD CKD-EPI 2021: 49 ML/MIN/1.73M2 (ref 60–?)
EOSINOPHIL # BLD AUTO: 0.51 X10(3) UL (ref 0–0.7)
EOSINOPHIL NFR BLD AUTO: 7.4 %
ERYTHROCYTE [DISTWIDTH] IN BLOOD BY AUTOMATED COUNT: 13.2 %
GLOBULIN PLAS-MCNC: 2.2 G/DL (ref 2–3.5)
GLUCOSE BLD-MCNC: 166 MG/DL (ref 70–99)
HAPTOGLOB SERPL-MCNC: 127 MG/DL (ref 30–200)
HCT VFR BLD AUTO: 40 % (ref 35–48)
HGB BLD-MCNC: 13.3 G/DL (ref 12–16)
HGB RETIC QN AUTO: 29.4 PG (ref 28.2–36.6)
IGA SERPL-MCNC: 51.6 MG/DL (ref 40–350)
IGM SERPL-MCNC: 357.5 MG/DL (ref 50–300)
IMM GRANULOCYTES # BLD AUTO: 0.06 X10(3) UL (ref 0–1)
IMM GRANULOCYTES NFR BLD: 0.9 %
IMM RETICS NFR: 0.11 RATIO (ref 0.1–0.3)
IMMUNOGLOBULIN PNL SER-MCNC: 394 MG/DL (ref 650–1600)
LDH SERPL L TO P-CCNC: 203 U/L (ref 120–246)
LYMPHOCYTES # BLD AUTO: 0.46 X10(3) UL (ref 1–4)
LYMPHOCYTES NFR BLD AUTO: 6.7 %
MCH RBC QN AUTO: 28.1 PG (ref 26–34)
MCHC RBC AUTO-ENTMCNC: 33.3 G/DL (ref 31–37)
MCV RBC AUTO: 84.4 FL (ref 80–100)
MONOCYTES # BLD AUTO: 0.66 X10(3) UL (ref 0.1–1)
MONOCYTES NFR BLD AUTO: 9.6 %
NEUTROPHILS # BLD AUTO: 5.04 X10 (3) UL (ref 1.5–7.7)
NEUTROPHILS # BLD AUTO: 5.04 X10(3) UL (ref 1.5–7.7)
NEUTROPHILS NFR BLD AUTO: 73.6 %
OSMOLALITY SERPL CALC.SUM OF ELEC: 306 MOSM/KG (ref 275–295)
PLATELET # BLD AUTO: 269 10(3)UL (ref 150–450)
POTASSIUM SERPL-SCNC: 4 MMOL/L (ref 3.5–5.1)
PROT SERPL-MCNC: 6.8 G/DL (ref 5.7–8.2)
RBC # BLD AUTO: 4.74 X10(6)UL (ref 3.8–5.3)
RETICS # AUTO: 70.6 X10(3) UL (ref 22.5–147.5)
RETICS/RBC NFR AUTO: 1.5 % (ref 0.5–2.5)
SODIUM SERPL-SCNC: 144 MMOL/L (ref 136–145)
WBC # BLD AUTO: 6.9 X10(3) UL (ref 4–11)

## 2025-07-24 NOTE — PROGRESS NOTES
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 Pt states she is feeling good, she was instructed to stop taking her hydrocortisone. Pt presents with no complaints.       Education Record    Learner:  Patient    Disease / Diagnosis: warm autoimmune hemolytic anemia    Barriers / Limitations:  None   Comments:    Method:  Discussion   Comments:    General Topics:  Medication, Pain, Side effects and symptom management, and Plan of care reviewed   Comments:    Outcome:  Observed demonstration and Shows understanding   Comments:

## 2025-07-24 NOTE — PROGRESS NOTES
Hematology/Oncology Return Visit Note    Patient Name: Kassi Childress  Medical Record Number: [id number]    YOB: 1950   Date of Service: 7/24/25  Physician requesting consultation: Mary Pickard MD    Reason for Consultation:  Kassi Childress was seen today for the diagnosis of IgM MGUS, CCUS, WAIHA    Interval History   TODAY 7/24/25  Patient presents for follow up of IgM MGUS and WAIHA.  Overall, she is doing very well.   Labs 7/24/25:   Hb 13.3 g/dL  IgA normal, IgG 394 (low), IgM 357.5 (elevated). SPEP in process  Hemolytic labs normal    5/19/25  Patient presents for follow up of IgM MGUS and WAIHA.  She is accompanied by her .   Overall, she is doing very well.  She gained a few pounds.  She was started on low dose hydrocortisone by endocrinology for secondary adrenal insufficiency.  BP consistently high (systolic 160) for last 2 months.  She had visit with Dr. Marr from [facility] 4/25/25 - he agrees with my management.  Hb 12.8 g/dL, plt 216, WBC 6.1.     4/3/25  Patient presents for follow up of WAIHA.  She completes her steroid taper today.  She is off insulin and her sugars have been much improved.  She has had some diarrhea which she attributes to chronic gallbladder issues.  Denies fevers, chills, nausea, vomiting.  Her energy has been good.     3/20/25  Patient is here for follow-up and to start C1D22 weekly rituximab (last weekly dose). Continues on prednisone taper; starting 5 mg PO every day. Hb is stable at 12.7 and platelets are 177. Overall she is doing well. Has no complaints today. On exam it appears her moon facies has improved. Taper prednisone to 5 mg/day starting tomorrow.    3/13/25  Patient is here for follow-up and to start C1D15 weekly rituximab. She is accompanied by her . She endorses some facial swelling a few days after rituximab - however, otherwise, is tolerating infusions quite well. She saw neurology recently, Dr. Bey on 3/12/25.  EMG showed mildly abnormal study, due to isolated reduced amplitude of the right sural sensory response. He states \"here is, however, no evidence for a widespread polyneuropathy affecting upper and lower extremities and no suggestion for a primary demyelinating neuropathy, or atypical neuropathy, or myopathy.  Of note, testing was somewhat limited by electrical artifact as well; clinical correlation is advised as to the etiology of these findings.\"Taper prednisone to 7.5 mg/day starting tomorrow.    3/6/25  Patient is here for follow-up and to start C1D8 weekly rituximab. She is accompanied by her . She did well with her first dose of rituximab last week. Had no infusion reaction. She had some fatigue for 2 days after rituximab; this has resolved. She does endorse a cushingoid face; still having difficulty with controlling her sugars though they are improved.  Has EMG with neurology next week. Taper prednisone to 10 mg/day starting tomorrow.    2/27/25  Patient is here for follow-up and to start C1D1 weekly rituximab. She is accompanied by her . Overall, she is doing well. Her sugars continue to be unpredictable at home, sometimes shooting upto 400. She is following with endocrinology closely. She is scheduled for EMG mid March 2025. Taper prednisone to 20 mg/day starting tomorrow.    2/20/25  Patient is here for follow-up with her . She is continuing her long-prednisone taper. Continues to have some jitters and hyperglycemia due to steroids, but overall, this is much more controlled. She is compliant with her insulin. She started having URI symptoms (sore throat, some cough, wheezing) for 1 week but is trying to avoid antibiotics since she had C diff last year. Her Hb has normalized today at 12.1 g/dL. Hemolysis labs are now normal. Her energy has much improved - she cooked dinner for 3-4 hours last night and even cleaned the litter box. She has EMG scheduled March 2025. Taper prednisone to 30  mg/day starting tomorrow.    2/10/25 She was seen at [facility] for second opinion on 2/10/25 by Dr. Tejinder Pineda (hematology/oncology), who agreed with my workup and management. He did have a suggestion to add 4-week course of rituximab, since it has been associated with longer chances of remission.  She has follow up with Dr. Pineda in April 2025. I greatly appreciate his input.    2/6/25  She is here for follow-up and labs. She went to [place] [facility] last Friday for palpitations and home blood sugar of 400s; was given insulin and discharged. She feels better than she did last week. She is on insulin now for steroid induced hyperglycemia; seeing endocrinology tomorrow. Creatinine has improved and is following with nephrology. She fell on the ice on her way here (on her gluteus) but did not hit her head. She has new mild swelling in her ankles. She has some jitters from the prednisone. Her energy is improved. They're going to [facility] this Sunday. Hb stable today at 10.7 g/dL. Taper prednisone to 50 mg/day starting tomorrow.    1/30/25  Today patient presents with her  for follow-up. She feels very emotional today. She has been taking prednisone 70 mg daily along with PPI but endorses feeling many jitters because of steroids, as well as hyperglycemia with recent home sugar 300. She denies polyuria or polydipsia. She does report a little improvement in her energy, however, overall she is feeling very \"jittery\" from the steroids. I reviewed with her in detail how important the steroids are, especially as her Hb has increased significantly since starting them. She expressed understanding and was very happy to learn her Hb has come up nicely to 9.3 g/dL. However, she is worried about her worsening kidney function (elevated creatinine).   Taper prednisone to 60 mg/day.    1/23/25:  Today she presents to the office with her  to discuss results and next steps. Since the last visit, she has no  significant changes. She continues to have dyspnea on exertion, dizziness with positional changes, and fatigue. She denies headache, fevers, chills, nausea, vomiting. She still has numbness tingling in both fingertips/feet. Worse in fingertips vs feet. This all started around 12/25/24. Has no history of lymphoma or Waldenstrom's macroglobulinemia. Has no back pain today.   Patient started prednisone 70 mg/day.       Review of Systems:  A 10-point ROS was done with pertinent positives and negative per the HPI    There were no vitals filed for this visit.    Wt Readings from Last 6 Encounters:   01/10/25 65.3 kg (144 lb)   01/07/25 65.3 kg (144 lb)   12/13/24 65 kg (143 lb 3.2 oz)   11/26/24 65.8 kg (145 lb)   11/06/24 63.5 kg (140 lb)   09/23/24 63.5 kg (140 lb)     ECOG PS: 1    Physical Examination:  General: Patient is alert and oriented, no acute distress. Cushingoid face visible (improved)  Psych: Mood and affect are appropriate  Eyes: EOMI, bilateral conjunctiva normal  ENT: Oropharynx is clear  CV: Regular rate and rhythm, no murmurs, no LE edema  Respiratory: Lungs clear to auscultation bilaterally  GI/Abd: Soft, non-tender with normoactive bowel sounds, no hepatosplenomegaly  Heme: delayed capillary refill, fingertips cold to touch, no ecchymosis, no petechiae, no purpura  Lymphatics: No enlarged or palpable cervical, occipital, supraclavicular, or infraclavicular lymph nodes  Neurological: Grossly intact   Skin: no rashes or skin lesions    Laboratory:  No results for input(s): \"WBC\", \"HGB\", \"HCT\", \"PLT\", \"MCV\", \"RDW\", \"NEPRELIM\" in the last 168 hours.    No results for input(s): \"NA\", \"K\", \"CL\", \"CO2\", \"BUN\", \"CREATSERUM\", \"GFRAA\", \"GFRNAA\", \"GLU\", \"CA\", \"PHOS\", \"TP\", \"ALB\", \"ALKPHO\", \"AST\", \"ALT\", \"BILT\" in the last 168 hours.    Invalid input(s): \"MAG\"    No results for input(s): \"PT\", \"INR\", \"PTT\", \"FIB\" in the last 168 hours.    Imaging:    MRI WHOLE BODY (W+WO) (CPT=76498)  Result Date:  1/20/2025  CONCLUSION:  There is no evidence of bony lesion to suggest multiple myeloma.   LOCATION:  [facility]   Dictated by (CST): Jet Mir MD on 1/20/2025 at 10:42 AM     Finalized by (CST): Jet Mir MD on 1/20/2025 at 10:57 AM       CT BIOPSY AND ASPIRATION BONE MARROW (CPT=38222/97918)  Result Date: 1/16/2025  CONCLUSION: CT-Guided bone marrow biopsy without complication  LOCATION:  [facility]   Dictated by (CST): Keenan Reno MD on 1/16/2025 at 9:55 AM     Finalized by (CST): Keenan Reno MD on 1/16/2025 at 9:55 AM       CT CHEST+ABDOMEN+PELVIS(ALL CNTRST ONLY)(VRE=[id number])  Result Date: 1/9/2025  CONCLUSION:   1. Stable pulmonary nodule in the right upper lobe measures 5 mm and should be followed up with more long-term 12 month follow-up in December of 2025 with low-dose chest CT by Fleischner criteria.  2. Nodularity along the surface of the liver is a stable finding since 2020 and is benign.  3. The prominent lymph nodes within the celiac axis and portal caval region are likely benign given their stability since 2020.     LOCATION:  [facility]    Dictated by (CST): Jet Mir MD on 1/09/2025 at 2:17 PM     Finalized by (CST): Jet Mir MD on 1/09/2025 at 2:27 PM       CT CHEST (MGR=[id number])  Result Date: 12/27/2024  CONCLUSION:  Plaque-like pulmonary nodule right lower lobe contacting the diaphragm surface 1.1 cm greatest dimension, and a smaller right upper lobe pulmonary nodule.  Suggest longer-term follow-up to show stability, consider follow-up scan in 1 year, unless clinical features dictated earlier.  Splenomegaly partially seen upper abdomen.  Trace pericardial effusion.  Coronary artery calcifications are seen.  LOCATION:  [id number]   Dictated by (CST): Santosh De Paz MD on 12/27/2024 at 3:09 PM     Finalized by (CST): Santosh De Paz MD on 12/27/2024 at 3:13 PM       CT ABDOMEN+PELVIS KIDNEYSTONE 2D RNDR(NO IV,NO ORAL)(CPT=74176)  Result Date: 11/26/2024  CONCLUSION:  1. A specific  etiology for pain is not evident. 2. There are no obstructing renal or ureteral stones. 3. Mild lymphadenopathy evident in gastrohepatic ligament, nabeel hepatis and portal caval space are noted.  There is also pleural based nodularity along the right diaphragm.  Lymph nodes have increased in size slightly since prior studies.  Clinical significance is uncertain.  This could represent reactive adenopathy or be seen in the setting of sarcoidosis.  Low-grade lymphoma or leukemia could have this appearance.    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  There are no obstructing renal or ureteral stones.  BLADDER:  Mild perivesical stranding is noted which could indicate cystitis.  ADRENALS:  No mass or enlargement.    LIVER:  No enlargement, atrophy, abnormal density, or significant focal lesion.    BILIARY:  There has been prior cholecystectomy.  PANCREAS:  No lesion, fluid collection, ductal dilatation, or atrophy.    SPLEEN:  No enlargement or focal lesion.    AORTA/VASCULAR:  There is aortic atherosclerosis without aneurysm.  RETROPERITONEUM:  No mass or adenopathy.    BOWEL/MESENTERY:  Gastrohepatic ligament lymph node series 3, image 37 measures 1.4 x 1 cm (previously 1.4 x 0.8 cm).  Lymph node near nabeel hepatis just above the body of the pancreas noted series 3, image 56 measures 2 x 1.4 cm (previously 1.4 x 0.8  cm).  Portal caval space lymph node series 3, image 59 measures 2.6 x 1.3 cm (previously 2.2 x 1.4 cm).  ABDOMINAL WALL:  No mass or hernia.    BONES:  There is degenerative disc disease in the lumbar spine.  PELVIC ORGANS:  There has been prior hysterectomy.  LUNG BASES:  There  is pleural based nodularity along the dome of the diaphragm noted for example series 3, image 14 to measure 1.1 x 0.8 cm.  OTHER:  Negative.          Impression   CONCLUSION:    1. A specific etiology for pain is not evident.  2. There are no obstructing renal or ureteral stones.  3. Mild lymphadenopathy evident in gastrohepatic ligament, nabeel hepatis and portal caval space are noted.  There is also pleural based nodularity along the right diaphragm.  Lymph nodes have increased in size slightly since prior studies.  Clinical  significance is uncertain.  This could represent reactive adenopathy or be seen in the setting of sarcoidosis.  Low-grade lymphoma or leukemia could have this appearance.     CT C/A/P 12/27/24  CHEST:    LUNGS:  Right apical noncalcified pulmonary nodule measuring 5 mm which is stable.  MEDIASTINUM:  No mass or adenopathy.    ROSEY:  No mass or adenopathy.    CARDIAC:  No enlargement, pericardial thickening, or significant coronary artery calcification.  PLEURA:  No mass or effusion.    CHEST WALL:  No mass or axillary adenopathy.    AORTA:  No aneurysm or dissection.    VASCULATURE:  No visible pulmonary arterial thrombus or attenuation.       ABDOMEN/PELVIS:  LIVER:  The nodularity along the surface of the liver may be due to fibrous lesions of the diaphragm measuring approximately 6 mm and 5 mm in size which is stable since previous study.  No enlargement, atrophy, abnormal density, or significant focal  lesion.  Low-attenuation nonenhancing lesion within the lateral segment left lobe of the liver measuring 7 mm consistent with a cyst.     Lymph node within the nabeel hepatis near the celiac axis measuring 18 x 11 mm is decreased in size were did measure (20 x 14 mm).  Portal caval lymph node measures 25 x 14 mm which is stable since previous study.  BILIARY:  No visible dilatation or calcification.  Status post cholecystectomy.  PANCREAS:  No lesion, fluid collection, ductal dilatation, or  atrophy.    SPLEEN:  No enlargement or focal lesion.    KIDNEYS:  There are multiple right-sided cysts which appear simple with the largest in the midpole measuring 18 mm.  Left kidney demonstrates parapelvic cyst measuring 14 x 7 mm which is simple.  There is no evidence of stone or hydronephrosis.  ADRENALS:  No mass or enlargement.    AORTA:  No aneurysm or dissection.    RETROPERITONEUM:  No mass or adenopathy.  Aortocaval lymph node is stable measuring 5 x 5 mm.  BOWEL/MESENTERY:  No visible mass, obstruction, or bowel wall thickening.    ABDOMINAL WALL:  No mass or hernia.    URINARY BLADDER:  No visible focal wall thickening, lesion, or calculus.    PELVIC NODES:  No adenopathy.    PELVIC ORGANS:  Status post hysterectomy.  No visible mass.  Pelvic organs appropriate for patient age.    BONES:  No bony lesion or fracture.  Mild degenerative changes of the thoracic and lumbar spine.  This is most pronounced at L5-S1.      Impression   CONCLUSION:       1. Stable pulmonary nodule in the right upper lobe measures 5 mm and should be followed up with more long-term 12 month follow-up in December of 2025 with low-dose chest CT by Fleischner criteria.     2. Nodularity along the surface of the liver is a stable finding since 2020 and is benign.     3. The prominent lymph nodes within the celiac axis and portal caval region are likely benign given their stability since 2020.          MRI Whole body Spine 1/23/25  FINDINGS:    BONES:  No significant arthropathy, fracture, or bone lesion.  The spine is grossly unremarkable without evidence of definitive lesion.  There is mild degenerative changes noted within the cervical spine with disc space narrowing at C3-4, C4-5, and C5-6   as well as endplate osteophytes.  There is also mild degenerative disc disease involving the mid thoracic spine at T7-T8.   SOFT TISSUES:  Negative.  No visible Soft tissue swelling or calcification.  No evidence of abnormal contrast  enhancement.   EFFUSION:  None visible.   CHEST:  The mediastinum is grossly unremarkable.  There is no mediastinal adenopathy.  The heart and vascular structures are within normal limits.   ABDOMEN/PELVIS:  The liver is grossly unremarkable.  The spleen is mildly prominent size.  There is no adenopathy within the abdomen or pelvis.  Small cysts are noted within the right kidney measuring up to 16 mm in maximum size.   HEAD:  Visualized brain parenchyma demonstrates normal ventricles sulci.  There is no evidence of abnormal enhancement within the brain.  The calvarium is grossly unremarkable without evidence of abnormal enhancement or lesion.  Paranasal sinuses and   orbits unremarkable.         Pathology:  12/3/24 Colonoscopy  Final Diagnosis:   A: Colon, cecum, polyp:   -Tubular adenoma.   -Negative for malignancy.     B: Colon, random, biopsies:   -Strips of colonic mucosa without diagnostic abnormality.   -No evidence of architectural distortion, dysplasia or malignancy.       Final Diagnosis 1/22/2025:     Bone marrow core biopsy, aspirate, clot section, and touch preparation:  -Small monotypic lymphoplasmacytic infiltrate, consistent with IgM monoclonal gammopathy of undetermined significance.  -Background hypercellular bone marrow with multilineage hematopoiesis,  erythroid predominance, and mild megaloblastoid change.    -See comment     Electronically signed by Juan Lucas MD on 1/22/2025 at 1008        Final Diagnosis Comment      The bone marrow aspirate smears are cellular and adequate for evaluation.  There is an erythroid predominance.  The erythroid series shows mild megaloblastoid changes.    The myeloid series shows progressive maturation.  Blasts are not increased.  The majority of megakaryocytes appear unremarkable.  Rare small megakaryocytes with hypolobated nuclei are noted.  In the aspirate, only scattered small lymphocytes and plasma cells are noted.  Special stain for iron shows adequate  storage iron.  No ring sideroblasts are seen.    The core biopsy is adequate for evaluation.  The bone marrow is hypercellular for age with an estimated cellularity of 50%.  Erythroid precursors appear increased.  Myeloid and megakaryocytic elements are present and adequate to slightly increased numbers.  There are small well-circumscribed, nonparatrabecular lymphoid aggregates.  Immunoperoxidase stains were performed to further evaluate the core biopsy and clot section.  CD3 and CD20 highlight scattered interstitial small lymphocytes and demonstrate that the lymphoid aggregates are composed of a mixture of T and B lymphocytes.  Based on CD20 stain, the B cell infiltrate comprises less than 5% of bone marrow cellularity.  Cyclin D1 is negative within lymphocytes.  CD34 and  show no significant increase in blasts.   highlights occasional plasma cells which comprise less than 5% of bone marrow cellularity.  Sheets of plasma cells are not seen.  In situ hybridization for kappa and lambda demonstrates that the plasma cells are kappa light chain restricted.  Special stain for reticulin demonstrates no significant reticulin fibrosis.    Flow cytometry immunophenotyping studies were performed on the submitted bone marrow aspirate.  The lymphoid population is composed predominantly of T cells with occasional B cells and NK cells.  Although B cells comprise a minority of the lymphoid population, the B cells are monotypic based on kappa surface immunoglobulin light chain restriction.  The monotypic B cells are CD19 positive, CD20 positive, CD5 negative, and CD10 negative.  T cells show no significant antigen deletion with the markers assayed.  The CD4-CD8 ratio is unremarkable.  Based on selective gating, there is a small, aberrant population of bright CD38 positive, CD56 positive plasma cells ( less than 20 events) that shows a marked kappa cytoplasmic immunoglobulin light predominance.      Differential  considerations for the small monotypic B-cell and plasma cell infiltrates in the setting of IgM kappa gammopathy include IgM monoclonal gammopathy of undetermined significance and focal bone marrow involvement by a lymphoplasmacytic lymphoma.  The histologic features favor IgM gammopathy of undetermined significance.  Clinical and radiographic correlation is suggested.           Differential considerations for the hypercellular bone marrow with erythroid predominance and mild megaloblastoid change include reactive conditions (nutritional deficiency, toxins, autoimmune conditions, infection, or hemolysis) as well as an evolving low-grade myelodysplastic syndrome.  Based on morphology, a reactive etiology is favored.  Correlation with clinical findings and pending conventional cytogenetic studies/ molecular studies is suggested.     Dr. Goldberg has reviewed the case and concurs.         Ancillary Studies      Bone Marrow Microscopic Description:  CBC: January 6, 2025  WBC (103/ul) 7.4   RBC (106/ul) 2.38   HGB (gm/dl) 8.4   HCT (%) 25   PLATELET (103/uL) 54   MCV (fL) 105   MCH (pg) 35.3   MCHC (gm/dL) 33.6   RDW (%) 21.9   Neutrophils (103/uL) 5.07   Lymphocytes (103/uL) 0.85   Monocytes (103/uL) 0.40   Eosinophils (103/uL) 0.68   Basophils (103/uL) 0.12      Peripheral Blood Smear Interpretation: A recent peripheral blood smear is not available for review at the time of diagnosis.  Bone Marrow Aspirate, Clot, Biopsy And Touch Preps:   Adequacy: The bone marrow aspirate smears are cellular and adequate for evaluation.  The core biopsy consists of trabecular bone and adequate bone marrow.  Aspirate Differential (Percent of 500-cell count):      Granulocytes 41   Blasts <1   Normoblasts 52   Lymphocytes 5   Monocytes <1   Eosinophils 2   Basophils <1   Plasma Cells <1      Touch Preps: The touch preparations contain myeloid, erythroid and megakaryocytic elements  Clot: The particle clot section consists of cellular  particles with myeloid erythroid and megakaryocytic elements  Cellularity:  ASPIRATE: Active  BIOPSY %: The core biopsy is hypercellular for age with an estimated cellularity of 50%.  Erythroid:  CELLULARITY: Increased  MORPHOLOGY: The erythroid series shows mild megaloblastoid changes.  Myeloid:  CELLULARITY: Adequate to slightly increased  MORPHOLOGY: The myeloid series shows progressive maturation.  Blasts are not increased  Megakaryocyte:  CELLULARITY: Adequate  MORPHOLOGY: The majority of megakaryocytes are unremarkable.  Rare small hypolobated megakaryocytes are noted  Lymphocytes: Lymphocytes are small and mature appearing.  There are small well-circumscribed nonparatrabecular lymphoid aggregates composed of small mature appearing lymphocytes.  Plasma Cells: Plasma cells are not significantly increased.  The plasma cells are small with condensed chromatin.  Based on immunohistochemistry, the plasma cells comprise less than 5% of bone marrow cellularity.  Sheets of plasma cells are not identified.  Bony Trabeculae: Unremarkable  Immunohistochemistry and Special Stains:     Immunohistochemistry:     Material:  Block A1/B1  Population:  Tissue     Antibody                          Result  CD3                                   See Comment  CD20                                See Comment  CD34                                See Comment                                See Comment                                    See Comment  Cyclin D1                         See Comment           Medical Necessity    Immunohistochemical stains were performed:                  See Comment                   Positive tissue controls were utilized in the staining process.  These slides were reviewed by the signout Pathologist and showed appropriate staining results.     Interpreted by:  Juan Lucas MD     Methodology:         Immunohistochemical stains are performed on formalin-fixed, paraffin-embedded tissue sections.   Deparaffinization, antigen retrieval, and staining utilizes the automated Leica Goode III immunohistochemistry platform.  A proprietary, non-biotin, polymer-based detection system (Bond Polymer Refine DetectionTM ) is employed.  All antibodies are validated by [facility] Department of Pathology to document appropriate staining reactions.  Positive controls are utilized and show appropriate reactivity.     Special Stain(s):     Material:  Block A and Aspirate   Population:  Tissue     Stain                                Result  Iron                                    See Comment   Retic                                 See Comment                                              Positive tissue controls were utilized in the staining process.  These slides were reviewed by the [facility] Pathologist and showed appropriate staining results.     Interpreted by: Juan Lucas MD            Impression & Plan: Mrs. Childress is a 73 y/o F with PMHx of mildly elevated liver tests (+ smooth muscle antibody), HTN, HLD, Type 2 DM with albuminuria, basal cell carcinoma of skin of face s/p resection who presents for evaluation of anemia and thrombocytopenia. She was found to have IgM MGUS, CCUS, and warm autoimmune hemolytic anemia.       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     - hemolysis labs 1/7/25: reticulocyte % 16.5, retic absolute 405 (very elevated),  (elevated), Blood bank Antibody screen positive with Warm IgG antibodies. ALMA positive for poly & IgG (+4); negative for C3D, Tbili 3.3 with indirect bili 2.2, haptoglobin < 10  - labs 1/23/25: , tbili 6.2 , indirect bilirubin 5.6, haptoglobin < 10, Hb 7.7 g/dL, hematocrit 22.1, plt 145 (improved from 54), retic 13.6% and absolute retic 301, ALMA Poly positive, IgG positive, C3D Negative  - 1/23/25: started prednisone  70 mg daily (1 mg/kg/daily) along with GI stress ulcer prophylaxis  - 2/10/25: She was seen at [facility] for second opinion on 2/10/25 by Dr. Tejinder Pineda (hematology/oncology), who agreed with my workup and management. He did have a suggestion to add 4-week course of rituximab, since it has been associated with longer chances of remission.  She has follow up with Dr. Pineda in April 2025. I greatly appreciate his input.  - 2/27/25: C1D1 Rituximab 375 mg/m2  - 3/6/25: C1D8 Rituximab 375 mg/m2  - 3/13/25: C1D15 Rituximab 375 mg/m2  - 3/20/25: C1D15 Rituximab 375 mg/m2    Steroids were tapered as follows:  1/31 - 2/6: prednisone 60 mg daily  2/7 - 2/13: prednisone 50 mg daily  2/14- 2/20: prednisone 40 mg daily  2/21- 2/27: prednisone 30 mg daily  2/28 - 3/6: prednisone 20 mg daily  3/7 - 3/13: prednisone 10 mg daily  3/14 - 3/20: prednisone 7.5 mg daily  3/21 - 3/27: prednisone 5.0 mg daily  3/28 - 4/3: prednisone 2.5 mg daily  4/4/2025: OFF steroids.     Recommendations:  TODAY 7-24-25:  - Labs today show stable Hb at 13.3 g/dL. Platelets stable at 269k. Hemolysis labs normal.   Hb 13.3 g/dL  IgA normal, IgG 394 (low), IgM 357.5 (slightly elevated but much improved from baseline, which was 917). SPEP in process  Hemolytic labs normal    - CBC, CMP, QI, SPEP q 3 months      IgM Monoclonal Gammopathy of Undetermined Significance (IgM MGUS), High-Intermediate risk MGUS -- Improved with steroids           Clonal Cytopenia of Undetermined Significance (CCUS) diagnosed 1/29/25 -- given anemia, thrombocytopenia, and TET2 mutation    Severe Fatigue (Improved), Peripheral Neuropathy, Postural Hypotension    Differential diagnosis: Lymphoplasmacytic lymphoma, Waldenstrom's macroglobulinemia, non-secretory myeloma, evolving low-grade MDS.    Workup:  Labs 1/7/25   Blood counts:  Hb 8.4, hematocrit 25, plt 54 k, , absolute lymphocytes 850   Citrated platelet count 79.2. confirms true thrombocytopenia with plt  < 150k  , uric acid 5.9  SPEP with PARIS: kappa free light chain 6.589, lambda normal 2.1, kappa/lambda ratio 3.02, beta globulins 1.25. Monoclonal IgM kappa seen (monoclonal spike in beta region)   QI: IgA 81.2, IgG 914, baseline IgM 917.2 (elevated)  Beta-2 microglobulin: 5.97  Cryoglobulins: none detected  PNH panel: no evidence of PNH  KWESI IFA Screen: Negative  HbSAg negative, HCV Ab nonreactive, Hep B surface Ab reactive,  HIV negative, Hep B Core Ab nonreactive  CT C/A/P with stable small RUL pulmonary nodule 5 mm, nodularity on liver (6 x 5 mm) sable from last time, and nabeel hepatis lymph node 18 x 11 mm decreased in size from previous, portal caval lymph node 25 x 14 mm (stable since previous study), aortocaval lymph node 5 x 5 mm stable    BMBx 1/16/25   Small monotypic lymphoplasmacytic infiltrate, consistent with IgM monoclonal gammopathy of undetermined significance.  Background hypercellular bone marrow with multilineage hematopoiesis,  erythroid predominance, and mild megaloblastoid change.    Cytogenetics: normal karyotype  Special stains: negative for congo red stain.     1/23/25: I spoke to our pathologist Dr. Lucas in detail regarding this patient's Bone marrow biopsy results. She has < 5 % plasma cells in the bone marrow, excluding the diagnosis of multiple myeloma at this time. She does have IgM MGUS with hypercellular bone marrow (50% cellular).  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 Gly1 361Arg, Frequency 24%    1/23/25: Started prednisone 70 mg PO every day (1mg/kg/daily) along with daily pantoprazole for stress ulcer prophylaxis.     1/28/25 Labs:  SPEP with monoclonal spike in beta region. Monoclonal IgM kappa. Kappa free light chains 2.752 , lambda 0.848, k/l ratio 3.25 (up from 3.02)  IgM decreased to 536.4 after initiating  prednisone  Hemolysis labs improving (reticulocyte, LDH, haptoglobin)  proBNP elevated at 1677  High sensitivity troponin <3  EKG with ,  (mildly low)  2D echo: EF 65-70%, no mention of abnormal global longitudinal strain  TSH, prolcatin, estradiol, testosterone are normal -- making POEMS less likely given lack of endocrinopathy. Her fasting glucose is high because she is on a steroid course    2/10/25: Patient seen by Dr. Tejinder Pineda at [facility] for second opinion. He does not think patient has Waldenstrom's since cytopenias in WM are usually due to bulky marrow involvement --which patient does not have. I agree with his assessment.   Labs at [facility] with negative cold agglutinin titer, Kappa/lambda 2.47 (elevated ) with kappa 2.10 and lambda light chain 0.85, QI with IgM 366 (High)/IgA 36(low)/IgG 514 (low), SPEP with Monoclonal M-protein of 0.3 g/dL      3/12/25 Given her persistent bilateral finger neuropathy, I have referred to neurology for EMG.  She saw neurology recently, Dr. Bey on 3/12/25. EMG showed mildly abnormal study, due to isolated reduced amplitude of the right sural sensory response. He states \"here is, however, no evidence for a widespread polyneuropathy affecting upper and lower extremities and no suggestion for a primary demyelinating neuropathy, or atypical neuropathy, or myopathy.  Of note, testing was somewhat limited by electrical artifact as well; clinical correlation is advised as to the etiology of these findings.\"     - Patient has CCUS as documented above, given she had clear evidence of anemia, thrombocytopenia, and TET-2 mutation with frequency 24% (> 2%). Low-grade evolving MDS is part of the differential, but BMBx does not show this.    4/21/25: SPEP with monoclonal spike IgM kappa. IgM 417.5    5/01/25: PET-CT with physicologic FDG activity throughout body. No evidence of lytic lesions.    Patient's IgM level has decreased steadily after starting  systemic therapy (steroid course and 4 doses of rituximab).     Recommendations:   Monitor labs every 3 months: SPEP with PARIS, QI, kappa/lambda ratio (next due ~10/24/25)      3. Secondary adrenal sufficiency - resolved  - Following with endocrinology, I appreciate their recommendations. Off hydrocortisone now      4. CKD stage 3a- Stable  - baseline Cr 1-1.3  - following with nephrology, I appreciate their recommendations      5. Uncontrolled HTN - Resolved  - Patient's systolic BP has been consistently > 160 since mid-March 2025. Possibly related to steroids? Advised keeping BP log and following up with PCP ASAP for titration of medications.   - 7/24/25: Normotensive       Follow-Up: Return to clinic 3 months for repeat labs (CBC with diff, CMP, QI, SPEP) and visit with me.       Beth Ojeda D.O.  [facility]

## 2025-07-28 LAB
ALBUMIN SERPL ELPH-MCNC: 4.01 G/DL (ref 3.75–5.21)
ALBUMIN/GLOB SERPL: 1.83 {RATIO} (ref 1–2)
ALPHA1 GLOB SERPL ELPH-MCNC: 0.3 G/DL (ref 0.19–0.46)
ALPHA2 GLOB SERPL ELPH-MCNC: 0.74 G/DL (ref 0.48–1.05)
B-GLOBULIN SERPL ELPH-MCNC: 0.81 G/DL (ref 0.68–1.23)
GAMMA GLOB SERPL ELPH-MCNC: 0.33 G/DL (ref 0.62–1.7)
KAPPA LC FREE SER-MCNC: 2.99 MG/DL (ref 0.33–1.94)
KAPPA LC FREE/LAMBDA FREE SER NEPH: 2.57 {RATIO} (ref 0.26–1.65)
LAMBDA LC FREE SERPL-MCNC: 1.16 MG/DL (ref 0.57–2.63)
PROT SERPL-MCNC: 6.2 G/DL (ref 5.7–8.2)

## 2025-08-26 DIAGNOSIS — I15.2 HYPERTENSION ASSOCIATED WITH DIABETES (HCC): ICD-10-CM

## 2025-08-26 DIAGNOSIS — E11.59 HYPERTENSION ASSOCIATED WITH DIABETES (HCC): ICD-10-CM

## 2025-08-27 RX ORDER — LOSARTAN POTASSIUM 100 MG/1
100 TABLET ORAL EVERY EVENING
Qty: 90 TABLET | Refills: 3 | Status: SHIPPED | OUTPATIENT
Start: 2025-08-27

## (undated) NOTE — LETTER
09/03/20        Ethel Hinton  [address] [place] [address]      Dear Gerber Figueroa,    [facility] records indicate that you have outstanding lab work and or testing that was ordered for you and has not yet been completed:  Orders Placed This Encou

## (undated) NOTE — Clinical Note
Kimberlee,I am sending Mrs. Childress your way for transaminitis and elevated smooth muscle Ab. Hep panel was negative. CT abdomen from 8/2018 did not show fatty infiltration or cirrhosis. Please let me know if you need any additional tests prior to seeing her. Marlo Bourne

## (undated) NOTE — Clinical Note
Kassi Helms Dr. is struggling with poor appetite from her prednisone and will be starting immunotherapy next week.   Her DM was well controlled on jardiance prior to being on prednisone and ultimately it seems her current blood sugar variations are due to prednisone.   I was wondering if we could temporarily hold her jardiance and just focus on insulin management by you until she is feeling better and has more of an appetite.   Thanks, Mary

## (undated) NOTE — Clinical Note
Initial assessment completed with patient.  Appointment scheduled with Endocrinology for 02/07/2025. Patient agrees to additional follow-up calls from nurse care manager.  Thank you!

## (undated) NOTE — LETTER
12/18/18        Maddie Bhakta  [address] [address]      Dear [facility],    [facility] records indicate that you have outstanding lab work and or testing that was ordered for you and has not yet been completed:  Orders Placed This Encount

## (undated) NOTE — ED AVS SNAPSHOT
Colby Taylor   MRN: [id number]    Department:  [facility] Emergency Department   Date of Visit:  8/8/2018           Disclosure     Insurance plans vary and the physician(s) referred by the ER may not be covered by your plan.  Please contact your tell this physician (or your personal doctor if your instructions are to return to your personal doctor) about any new or lasting problems. The primary care or specialist physician will see patients referred from the [facility] Emergency Department.  Nirav Ch

## (undated) NOTE — Clinical Note
Kimberlee Heaton,  I saw Patty today and she is really struggling on hydrocortisone. Her HTN is uncontrolled and I suspect hydrocortisone may be playing a role. She is also feeling lethargic and more irritable on it.  I was hopeful you could discuss this further with her before her appt on July 8th. I have temporarily added a second HTN agent but am hopeful to stop it once hydrocortisone has been lowered/stopped if appropriate.  Steven Hernandez

## (undated) NOTE — LETTER
January 28, 2020    [address] [facility]    Dear Sylvia Perez: It was a pleasure speaking with you over the phone recently.  To follow up, I wanted to send you some contact information to utilize when you have a questio